# Patient Record
Sex: FEMALE | Race: BLACK OR AFRICAN AMERICAN | Employment: UNEMPLOYED | URBAN - METROPOLITAN AREA
[De-identification: names, ages, dates, MRNs, and addresses within clinical notes are randomized per-mention and may not be internally consistent; named-entity substitution may affect disease eponyms.]

---

## 2017-08-16 ENCOUNTER — OFFICE VISIT (OUTPATIENT)
Dept: FAMILY MEDICINE CLINIC | Age: 42
End: 2017-08-16

## 2017-08-16 VITALS
DIASTOLIC BLOOD PRESSURE: 75 MMHG | RESPIRATION RATE: 12 BRPM | HEART RATE: 88 BPM | WEIGHT: 153 LBS | TEMPERATURE: 98 F | BODY MASS INDEX: 25.49 KG/M2 | OXYGEN SATURATION: 99 % | HEIGHT: 65 IN | SYSTOLIC BLOOD PRESSURE: 113 MMHG

## 2017-08-16 DIAGNOSIS — R01.1 SYSTOLIC MURMUR: ICD-10-CM

## 2017-08-16 DIAGNOSIS — Z11.1 SCREENING-PULMONARY TB: Primary | ICD-10-CM

## 2017-08-16 DIAGNOSIS — D50.9 IRON DEFICIENCY ANEMIA, UNSPECIFIED IRON DEFICIENCY ANEMIA TYPE: ICD-10-CM

## 2017-08-16 NOTE — PATIENT INSTRUCTIONS
Heart Murmur: Care Instructions  Your Care Instructions    A heart murmur is a blowing, whooshing, or rasping sound made by blood moving through the heart or the blood vessels near the heart. Murmurs can be heard through a stethoscope. Heart murmurs can occur during pregnancy or during a temporary illness, such as a fever. These murmurs usually are not a problem and go away on their own. However, sometimes a heart murmur is a sign of a serious problem, such as congenital heart disease or heart valve problems, that may need treatment. You may need more tests to check your heart. The treatment depends on the specific heart problem causing the murmur. Follow-up care is a key part of your treatment and safety. Be sure to make and go to all appointments, and call your doctor if you are having problems. It's also a good idea to know your test results and keep a list of the medicines you take. How can you care for yourself at home? · Take your medicines exactly as prescribed. Call your doctor if you think you are having a problem with your medicine. You will get more details on the specific medicines your doctor prescribes. · If your doctor recommends it, limit over-the-counter medicines that have stimulants in them. These include decongestants and cold and flu medicines. · If your doctor recommends it, get more exercise. Walking is a good choice. Bit by bit, increase the amount you walk every day. Try for 30 minutes on most days of the week. You also may want to swim, bike, or do other activities. · Do not smoke. Smoking increases your risk of heart attack and stroke. If you need help quitting, talk to your doctor about stop-smoking programs and medicines. These can increase your chances of quitting for good. · Limit alcohol to 2 drinks a day for men and 1 drink a day for women. Alcohol may interfere with some of your medicines. When should you call for help?   Call 911 anytime you think you may need emergency care. For example, call if:  · You have severe trouble breathing. · You cough up pink, foamy mucus and you have trouble breathing. · You passed out (lost consciousness). · You have a seizure. · You have symptoms of a stroke. These may include:  ¨ Sudden numbness, tingling, weakness, or loss of movement in your face, arm, or leg, especially on only one side of your body. ¨ Sudden vision changes. ¨ Sudden trouble speaking. ¨ Sudden confusion or trouble understanding simple statements. ¨ Sudden problems with walking or balance. ¨ A sudden, severe headache that is different from past headaches. Call your doctor now or seek immediate medical care if:  · You have new or increased shortness of breath. · You feel dizzy or lightheaded, or you feel like you may faint. · You have sudden weight gain, such as more than 2 to 3 pounds in a day or 5 pounds in a week. (Your doctor may suggest a different range of weight gain.)  · You have increased swelling in your legs or feet. · You have a fever. Watch closely for changes in your health, and be sure to contact your doctor if you have any problems. Where can you learn more? Go to http://jacinto-marifer.info/. Yanira Romano in the search box to learn more about \"Heart Murmur: Care Instructions. \"  Current as of: April 3, 2017  Content Version: 11.3  © 6788-8175 [employer]. Care instructions adapted under license by [employer] (which disclaims liability or warranty for this information). If you have questions about a medical condition or this instruction, always ask your healthcare professional. [address] any warranty or liability for your use of this information.

## 2017-08-16 NOTE — MR AVS SNAPSHOT
Visit Information Date & Time Provider Department Dept. Phone Encounter #  
 8/16/2017  3:00 PM Shelbie Resendez MD [address] [facility] [phone] [id number] Upcoming Health Maintenance Date Due DTaP/Tdap/Td series (1 - Tdap) 11/26/1996 PAP AKA CERVICAL CYTOLOGY 11/26/1996 INFLUENZA AGE 9 TO ADULT 8/1/2017 Allergies as of 8/16/2017  Review Complete On: 8/16/2017 By: Shelbie Resendez MD  
 No Known Allergies Current Immunizations  Never Reviewed Name Date  
 TB Skin Test (PPD) Intradermal  Incomplete Not reviewed this visit You Were Diagnosed With   
  
 Codes Comments Screening-pulmonary TB    -  Primary ICD-10-CM: Z11.1 ICD-9-CM: V74.1 Iron deficiency anemia, unspecified iron deficiency anemia type     ICD-10-CM: D50.9 ICD-9-CM: 280.9 Systolic murmur     BVZ-38-ZJ: R01.1 ICD-9-CM: 662. 2 Vitals BP Pulse Temp Resp Height(growth percentile) Weight(growth percentile) 113/75 (BP 1 Location: Left arm, BP Patient Position: Sitting) 88 98 °F (36.7 °C) 12 5' 5\" (1.651 m) 153 lb (69.4 kg) SpO2 BMI OB Status Smoking Status 99% 25.46 kg/m2 Having regular periods Never Smoker Vitals History BMI and BSA Data Body Mass Index Body Surface Area  
 25.46 kg/m 2 1.78 m 2 Preferred Pharmacy Pharmacy Name Phone [facility] [address] [place] [address] Dr Guzman, [address] [place] [address] Avenue [phone] Your Updated Medication List  
  
   
This list is accurate as of: 8/16/17  3:37 PM.  Always use your most recent med list.  
  
  
  
  
 ferrous gluconate 325 mg (37 mg iron) Tab Take 1 Tab by mouth daily. ZyrTEC 10 mg tablet Generic drug:  cetirizine Take 10 mg by mouth daily. We Performed the Following AMB POC TUBERCULOSIS, INTRADERMAL (SKIN TEST) [36335 CPT(R)] CBC WITH AUTOMATED DIFF [36046 CPT(R)] FERRITIN [34567 CPT(R)] IRON PROFILE [id number] CPT(R)] METABOLIC PANEL, COMPREHENSIVE [23769 CPT(R)] To-Do List   
 08/30/2017 ECHO:  2D ECHO COMPLETE ADULT (TTE) W OR WO CONTR Patient Instructions Heart Murmur: Care Instructions Your Care Instructions A heart murmur is a blowing, whooshing, or rasping sound made by blood moving through the heart or the blood vessels near the heart. Murmurs can be heard through a stethoscope. Heart murmurs can occur during pregnancy or during a temporary illness, such as a fever. These murmurs usually are not a problem and go away on their own. However, sometimes a heart murmur is a sign of a serious problem, such as congenital heart disease or heart valve problems, that may need treatment. You may need more tests to check your heart. The treatment depends on the specific heart problem causing the murmur. Follow-up care is a key part of your treatment and safety. Be sure to make and go to all appointments, and call your doctor if you are having problems. It's also a good idea to know your test results and keep a list of the medicines you take. How can you care for yourself at home? · Take your medicines exactly as prescribed. Call your doctor if you think you are having a problem with your medicine. You will get more details on the specific medicines your doctor prescribes. · If your doctor recommends it, limit over-the-counter medicines that have stimulants in them. These include decongestants and cold and flu medicines. · If your doctor recommends it, get more exercise. Walking is a good choice. Bit by bit, increase the amount you walk every day. Try for 30 minutes on most days of the week. You also may want to swim, bike, or do other activities. · Do not smoke. Smoking increases your risk of heart attack and stroke. If you need help quitting, talk to your doctor about stop-smoking programs and medicines. These can increase your chances of quitting for good. · Limit alcohol to 2 drinks a day for men and 1 drink a day for women. Alcohol may interfere with some of your medicines. When should you call for help? Call 911 anytime you think you may need emergency care. For example, call if: 
· You have severe trouble breathing. · You cough up pink, foamy mucus and you have trouble breathing. · You passed out (lost consciousness). · You have a seizure. · You have symptoms of a stroke. These may include: 
¨ Sudden numbness, tingling, weakness, or loss of movement in your face, arm, or leg, especially on only one side of your body. ¨ Sudden vision changes. ¨ Sudden trouble speaking. ¨ Sudden confusion or trouble understanding simple statements. ¨ Sudden problems with walking or balance. ¨ A sudden, severe headache that is different from past headaches. Call your doctor now or seek immediate medical care if: 
· You have new or increased shortness of breath. · You feel dizzy or lightheaded, or you feel like you may faint. · You have sudden weight gain, such as more than 2 to 3 pounds in a day or 5 pounds in a week. (Your doctor may suggest a different range of weight gain.) · You have increased swelling in your legs or feet. · You have a fever. Watch closely for changes in your health, and be sure to contact your doctor if you have any problems. Where can you learn more? Go to http://jacinto-marifer.info/. Jina Frank in the search box to learn more about \"Heart Murmur: Care Instructions. \" Current as of: April 3, 2017 Content Version: 11.3 © 1991-5742 [employer]. Care instructions adapted under license by [employer] (which disclaims liability or warranty for this information). If you have questions about a medical condition or this instruction, always ask your healthcare professional. [place] [address] any warranty or liability for your use of this information. Introducing hospitals & HEALTH SERVICES! Getachew Gomes introduces PrestoBox patient portal. Now you can access parts of your medical record, email your doctor's office, and request medication refills online. 1. In your internet browser, go to [email or web address] 2. Click on the First Time User? Click Here link in the Sign In box. You will see the New Member Sign Up page. 3. Enter your PrestoBox Access Code exactly as it appears below. You will not need to use this code after youve completed the sign-up process. If you do not sign up before the expiration date, you must request a new code. · PrestoBox Access Code: [email or web address] Expires: 11/14/2017  3:18 PM 
 
4. Enter the last four digits of your Social Security Number (xxxx) and Date of Birth (mm/dd/yyyy) as indicated and click Submit. You will be taken to the next sign-up page. 5. Create a PrestoBox ID. This will be your PrestoBox login ID and cannot be changed, so think of one that is secure and easy to remember. 6. Create a PrestoBox password. You can change your password at any time. 7. Enter your Password Reset Question and Answer. This can be used at a later time if you forget your password. 8. Enter your e-mail address. You will receive e-mail notification when new information is available in [address] 9. Click Sign Up. You can now view and download portions of your medical record. 10. Click the Download Summary menu link to download a portable copy of your medical information. If you have questions, please visit the Frequently Asked Questions section of the PrestoBox website. Remember, PrestoBox is NOT to be used for urgent needs. For medical emergencies, dial 911. Now available from your iPhone and Android! Please provide this summary of care documentation to your next provider. Your primary care clinician is listed as [id number]. If you have any questions after today's visit, please call [phone].

## 2017-08-16 NOTE — PROGRESS NOTES
Subjective:     Tom Longoria is a 39 y.o. female who presents today with the following:  Chief Complaint   Patient presents with    [facility]    PPD Placement     New patient here today to have PPD placed as she is starting work as a  next week. No history of exposure to TB. No cough. Anemia  Patient with history of anemia, in the past has needed a blood transfusion as her Hgb was around 6. She has difficulty being compliant with iron supplementation as iron pills give her constipation, nausea, and bad stomach pain. ROS:  Gen: denies fever, chills, fatigue, weight loss, weight gain  HEENT:denies blurry vision, nasal congestion, sore throat  Resp: denies dypsnea, cough, wheezing  CV: denies chest pain, palpitations, lower extremity edema  Abd: denies nausea, vomiting, diarrhea, constipation    No Known Allergies      Current Outpatient Prescriptions:     cetirizine (ZYRTEC) 10 mg tablet, Take 10 mg by mouth daily. , Disp: , Rfl:     ferrous gluconate 325 mg (37 mg iron) Tab, Take 1 Tab by mouth daily. , Disp: 30 Tab, Rfl: 0    Past Medical History:   Diagnosis Date    Anemia     Depression     Other ill-defined conditions     chronic hives       Past Surgical History:   Procedure Laterality Date    HX GYN      cerclage x2       History   Smoking Status    Never Smoker   Smokeless Tobacco    Never Used       Social History     Social History    Marital status:      Spouse name: N/A    Number of children: N/A    Years of education: N/A     Social History Main Topics    Smoking status: Never Smoker    Smokeless tobacco: Never Used    Alcohol use No      Comment: rarely    Drug use: No    Sexual activity: Yes     Partners: Male     Other Topics Concern    None     Social History Narrative       Family History   Problem Relation Age of Onset    Cancer Mother     Diabetes Mother          Objective:     Visit Vitals    /75 (BP 1 Location: Left arm, BP Patient Position: Sitting)    Pulse 88    Temp 98 °F (36.7 °C)    Resp 12    Ht 5' 5\" (1.651 m)    Wt 153 lb (69.4 kg)    SpO2 99%    BMI 25.46 kg/m2     Gen: alert, oriented, no acute distress  Head: normocephalic, atraumatic  Eyes:sclera clear, conjunctiva clear  Oral: moist mucus membranes, no oral lesions, no pharyngeal exudate, no pharyngeal erythema  Neck: symmetric normal sized thyroid, no carotid bruits, no JVD  Resp: Normal work of breathing, lungs CTAB, no w/r/r  CV: S1, S2 normal.  2/6 systolic murmur at L and R sternal border. No rubs, or gallops. Abd:  Normal bowel sounds. Soft, not tender, not distended. Skin: no rash             Extremities: no edema    No results found for this visit on 08/16/17. Assessment/ Plan:   Diagnoses and all orders for this visit:    1. Screening-pulmonary TB  -     AMB POC TUBERCULOSIS, INTRADERMAL (SKIN TEST)  -follow up in 2 days for reading. 2. Iron deficiency anemia, unspecified iron deficiency anemia type  -     CBC WITH AUTOMATED DIFF  -     IRON PROFILE  -     FERRITIN  -     METABOLIC PANEL, COMPREHENSIVE  -if needed in the future, can consider iron transfusions as pt cannot tolerate oral iron    3. Systolic murmur  -     2D ECHO COMPLETE ADULT (TTE) W OR WO CONTR; Future  -pt has not been told she has murmur in the past.  No current cardiac symptoms. Possibly 2/2 anemia. Check labs and echo. Verbal and written instructions (see AVS) provided.  Patient expresses understanding of diagnosis and treatment plan. Stew Mcgovern.  Deandre Lanier MD

## 2017-08-16 NOTE — PROGRESS NOTES
.  Chief Complaint   Patient presents with   Ashley Armijo Establish Care    PPD Placement     . [address]

## 2017-08-17 LAB
ALBUMIN SERPL-MCNC: 4.2 G/DL (ref 3.5–5.5)
ALBUMIN/GLOB SERPL: 1.2 {RATIO} (ref 1.2–2.2)
ALP SERPL-CCNC: 66 IU/L (ref 39–117)
ALT SERPL-CCNC: 9 IU/L (ref 0–32)
AST SERPL-CCNC: 11 IU/L (ref 0–40)
BASOPHILS # BLD AUTO: 0 X10E3/UL (ref 0–0.2)
BASOPHILS NFR BLD AUTO: 0 %
BILIRUB SERPL-MCNC: 0.5 MG/DL (ref 0–1.2)
BUN SERPL-MCNC: 7 MG/DL (ref 6–24)
BUN/CREAT SERPL: 10 (ref 9–23)
CALCIUM SERPL-MCNC: 9.7 MG/DL (ref 8.7–10.2)
CHLORIDE SERPL-SCNC: 100 MMOL/L (ref 96–106)
CO2 SERPL-SCNC: 24 MMOL/L (ref 18–29)
CREAT SERPL-MCNC: 0.67 MG/DL (ref 0.57–1)
EOSINOPHIL # BLD AUTO: 0.1 X10E3/UL (ref 0–0.4)
EOSINOPHIL NFR BLD AUTO: 2 %
ERYTHROCYTE [DISTWIDTH] IN BLOOD BY AUTOMATED COUNT: 16.2 % (ref 12.3–15.4)
FERRITIN SERPL-MCNC: 11 NG/ML (ref 15–150)
GLOBULIN SER CALC-MCNC: 3.4 G/DL (ref 1.5–4.5)
GLUCOSE SERPL-MCNC: 65 MG/DL (ref 65–99)
HCT VFR BLD AUTO: 30.7 % (ref 34–46.6)
HGB BLD-MCNC: 8.9 G/DL (ref 11.1–15.9)
IMM GRANULOCYTES # BLD: 0 X10E3/UL (ref 0–0.1)
IMM GRANULOCYTES NFR BLD: 0 %
IRON SATN MFR SERPL: 17 % (ref 15–55)
IRON SERPL-MCNC: 72 UG/DL (ref 27–159)
LYMPHOCYTES # BLD AUTO: 1.3 X10E3/UL (ref 0.7–3.1)
LYMPHOCYTES NFR BLD AUTO: 35 %
MCH RBC QN AUTO: 23.9 PG (ref 26.6–33)
MCHC RBC AUTO-ENTMCNC: 29 G/DL (ref 31.5–35.7)
MCV RBC AUTO: 83 FL (ref 79–97)
MONOCYTES # BLD AUTO: 0.5 X10E3/UL (ref 0.1–0.9)
MONOCYTES NFR BLD AUTO: 12 %
NEUTROPHILS # BLD AUTO: 1.9 X10E3/UL (ref 1.4–7)
NEUTROPHILS NFR BLD AUTO: 51 %
PLATELET # BLD AUTO: 409 X10E3/UL (ref 150–379)
POTASSIUM SERPL-SCNC: 4.7 MMOL/L (ref 3.5–5.2)
PROT SERPL-MCNC: 7.6 G/DL (ref 6–8.5)
RBC # BLD AUTO: 3.72 X10E6/UL (ref 3.77–5.28)
SODIUM SERPL-SCNC: 143 MMOL/L (ref 134–144)
TIBC SERPL-MCNC: 422 UG/DL (ref 250–450)
UIBC SERPL-MCNC: 350 UG/DL (ref 131–425)
WBC # BLD AUTO: 3.7 X10E3/UL (ref 3.4–10.8)

## 2017-08-18 ENCOUNTER — OFFICE VISIT (OUTPATIENT)
Dept: FAMILY MEDICINE CLINIC | Age: 42
End: 2017-08-18

## 2017-08-18 VITALS
OXYGEN SATURATION: 97 % | SYSTOLIC BLOOD PRESSURE: 105 MMHG | WEIGHT: 153 LBS | BODY MASS INDEX: 25.49 KG/M2 | HEART RATE: 79 BPM | DIASTOLIC BLOOD PRESSURE: 72 MMHG | TEMPERATURE: 98 F | RESPIRATION RATE: 12 BRPM | HEIGHT: 65 IN

## 2017-08-18 DIAGNOSIS — D50.9 IRON DEFICIENCY ANEMIA, UNSPECIFIED IRON DEFICIENCY ANEMIA TYPE: Primary | ICD-10-CM

## 2017-08-18 DIAGNOSIS — Z11.1 SCREENING-PULMONARY TB: ICD-10-CM

## 2017-08-18 LAB
MM INDURATION POC: 0 MM (ref 0–5)
PPD POC: NORMAL NEGATIVE

## 2017-08-18 NOTE — PROGRESS NOTES
Subjective:     Kaleb Morales is a 39 y.o. female who presents today with the following:  Chief Complaint   Patient presents with    Results    PPD Reading     Anemia  Patient with anemia, Hgb 8.9 on recent labs. Iron profile normal, but ferritin low. Patient has h/o iron deficiency anemia. Menstruation regular, last 5 days/month with 1.5 days of heavy bleeding. Not currently taking iron supplement. TB screening  Pt had PPD placed on 8/16/17, here today to have it read. ROS:  Gen: denies fever, chills, fatigue  Resp: denies dypsnea, cough, wheezing  CV: denies chest pain, palpitations  Abd: denies nausea, vomiting, diarrhea, constipation      No Known Allergies      Current Outpatient Prescriptions:     cetirizine (ZYRTEC) 10 mg tablet, Take 10 mg by mouth daily. , Disp: , Rfl:     ferrous gluconate 325 mg (37 mg iron) Tab, Take 1 Tab by mouth daily. , Disp: 30 Tab, Rfl: 0    Past Medical History:   Diagnosis Date    Anemia     Depression     self treats with essential oils    Other ill-defined conditions     chronic hives       Past Surgical History:   Procedure Laterality Date    HX GYN      cerclage x2       History   Smoking Status    Never Smoker   Smokeless Tobacco    Never Used       Social History     Social History    Marital status:      Spouse name: N/A    Number of children: N/A    Years of education: N/A     Social History Main Topics    Smoking status: Never Smoker    Smokeless tobacco: Never Used    Alcohol use No      Comment: rarely    Drug use: No    Sexual activity: Yes     Partners: Male     Other Topics Concern    None     Social History Narrative       Family History   Problem Relation Age of Onset    Cancer Mother     Diabetes Mother          Objective:     Visit Vitals    /72 (BP 1 Location: Left arm, BP Patient Position: Sitting)    Pulse 79    Temp 98 °F (36.7 °C)    Resp 12    Ht 5' 5\" (1.651 m)    Wt 153 lb (69.4 kg)    SpO2 97%    BMI 25.46 kg/m2     Gen: alert, oriented, no acute distress  Head: normocephalic, atraumatic  Eyes:sclera clear, conjunctiva clear  Oral: moist mucus membranes, no oral lesions, no pharyngeal exudate, no pharyngeal erythema  Resp: Normal work of breathing, lungs CTAB, no w/r/r  CV: S1, S2 normal.  2/6 systolic murmur at R and L sternal border. Abd:  Normal bowel sounds. Soft, not tender, not distended. Skin: PPD placement with 0 mm induration. Extremities: no edema  Results for orders placed or performed in visit on 08/16/17   CBC WITH AUTOMATED DIFF   Result Value Ref Range    WBC 3.7 3.4 - 10.8 x10E3/uL    RBC 3.72 (L) 3.77 - 5.28 x10E6/uL    HGB 8.9 (L) 11.1 - 15.9 g/dL    HCT 30.7 (L) 34.0 - 46.6 %    MCV 83 79 - 97 fL    MCH 23.9 (L) 26.6 - 33.0 pg    MCHC 29.0 (L) 31.5 - 35.7 g/dL    RDW 16.2 (H) 12.3 - 15.4 %    PLATELET 326 (H) 755 - 379 x10E3/uL    NEUTROPHILS 51 %    Lymphocytes 35 %    MONOCYTES 12 %    EOSINOPHILS 2 %    BASOPHILS 0 %    ABS. NEUTROPHILS 1.9 1.4 - 7.0 x10E3/uL    Abs Lymphocytes 1.3 0.7 - 3.1 x10E3/uL    ABS. MONOCYTES 0.5 0.1 - 0.9 x10E3/uL    ABS. EOSINOPHILS 0.1 0.0 - 0.4 x10E3/uL    ABS. BASOPHILS 0.0 0.0 - 0.2 x10E3/uL    IMMATURE GRANULOCYTES 0 %    ABS. IMM.  GRANS. 0.0 0.0 - 0.1 x10E3/uL   IRON PROFILE   Result Value Ref Range    TIBC 422 250 - 450 ug/dL    UIBC 350 131 - 425 ug/dL    Iron 72 27 - 159 ug/dL    Iron % saturation 17 15 - 55 %   FERRITIN   Result Value Ref Range    Ferritin 11 (L) 15 - 696 ng/mL   METABOLIC PANEL, COMPREHENSIVE   Result Value Ref Range    Glucose 65 65 - 99 mg/dL    BUN 7 6 - 24 mg/dL    Creatinine 0.67 0.57 - 1.00 mg/dL    GFR est non- >59 mL/min/1.73    GFR est  >59 mL/min/1.73    BUN/Creatinine ratio 10 9 - 23    Sodium 143 134 - 144 mmol/L    Potassium 4.7 3.5 - 5.2 mmol/L    Chloride 100 96 - 106 mmol/L    CO2 24 18 - 29 mmol/L    Calcium 9.7 8.7 - 10.2 mg/dL    Protein, total 7.6 6.0 - 8.5 g/dL    Albumin 4.2 3.5 - 5.5 g/dL GLOBULIN, TOTAL 3.4 1.5 - 4.5 g/dL    A-G Ratio 1.2 1.2 - 2.2    Bilirubin, total 0.5 0.0 - 1.2 mg/dL    Alk. phosphatase 66 39 - 117 IU/L    AST (SGOT) 11 0 - 40 IU/L    ALT (SGPT) 9 0 - 32 IU/L   AMB POC TUBERCULOSIS, INTRADERMAL (SKIN TEST)   Result Value Ref Range    PPD neg Negative    mm Induration 0 mm         Assessment/ Plan:   Diagnoses and all orders for this visit:    1. Iron deficiency anemia, unspecified iron deficiency anemia type  -patient to start taking slow release iron and see if she can tolerate this better  -follow up 6 weeks for labs  -pt will call scheduling back to set up echo for murmur today    2. Screening-pulmonary TB  -PPD WNL  -form filled out       Verbal and written instructions (see AVS) provided.  Patient expresses understanding of diagnosis and treatment plan. Maddy Del Rosario.  Lamar George MD

## 2017-08-29 ENCOUNTER — HOSPITAL ENCOUNTER (OUTPATIENT)
Dept: NON INVASIVE DIAGNOSTICS | Age: 42
Discharge: HOME | End: 2017-08-29
Attending: FAMILY MEDICINE
Payer: MEDICAID

## 2017-08-29 DIAGNOSIS — R01.1 SYSTOLIC MURMUR: ICD-10-CM

## 2017-08-29 PROCEDURE — 93306 TTE W/DOPPLER COMPLETE: CPT

## 2017-08-30 ENCOUNTER — TELEPHONE (OUTPATIENT)
Dept: FAMILY MEDICINE CLINIC | Age: 42
End: 2017-08-30

## 2017-08-30 NOTE — PROGRESS NOTES
Please call patient-echo was normal.  I mailed out a result letter. Follow up as discussed for anemia lab recheck.

## 2017-08-30 NOTE — PROGRESS NOTES
Let Ms Marychuy Welch know Dr. Teri Hinson said Please call patient-echo was normal.  I mailed out a result letter. Follow up as discussed for anemia lab recheck.  She voiced understanding

## 2017-12-05 ENCOUNTER — APPOINTMENT (OUTPATIENT)
Dept: CT IMAGING | Age: 42
End: 2017-12-05
Attending: EMERGENCY MEDICINE
Payer: MEDICAID

## 2017-12-05 ENCOUNTER — HOSPITAL ENCOUNTER (EMERGENCY)
Age: 42
Discharge: HOME | End: 2017-12-06
Attending: EMERGENCY MEDICINE | Admitting: EMERGENCY MEDICINE
Payer: MEDICAID

## 2017-12-05 DIAGNOSIS — K04.7 DENTAL ABSCESS: Primary | ICD-10-CM

## 2017-12-05 LAB
ALBUMIN SERPL-MCNC: 3.5 G/DL (ref 3.5–5)
ALBUMIN/GLOB SERPL: 0.8 {RATIO} (ref 1.1–2.2)
ALP SERPL-CCNC: 93 U/L (ref 45–117)
ALT SERPL-CCNC: 28 U/L (ref 12–78)
ANION GAP SERPL CALC-SCNC: 7 MMOL/L (ref 5–15)
AST SERPL-CCNC: 17 U/L (ref 15–37)
BASOPHILS # BLD: 0 K/UL (ref 0–0.1)
BASOPHILS NFR BLD: 0 % (ref 0–1)
BILIRUB SERPL-MCNC: 0.4 MG/DL (ref 0.2–1)
BUN SERPL-MCNC: 9 MG/DL (ref 6–20)
BUN/CREAT SERPL: 13 (ref 12–20)
CALCIUM SERPL-MCNC: 8.6 MG/DL (ref 8.5–10.1)
CHLORIDE SERPL-SCNC: 106 MMOL/L (ref 97–108)
CO2 SERPL-SCNC: 26 MMOL/L (ref 21–32)
CREAT SERPL-MCNC: 0.69 MG/DL (ref 0.55–1.02)
EOSINOPHIL # BLD: 0.1 K/UL (ref 0–0.4)
EOSINOPHIL NFR BLD: 2 % (ref 0–7)
ERYTHROCYTE [DISTWIDTH] IN BLOOD BY AUTOMATED COUNT: 15.9 % (ref 11.5–14.5)
GLOBULIN SER CALC-MCNC: 4.2 G/DL (ref 2–4)
GLUCOSE SERPL-MCNC: 89 MG/DL (ref 65–100)
HCG UR QL: NEGATIVE
HCG UR QL: NEGATIVE
HCT VFR BLD AUTO: 25.9 % (ref 35–47)
HGB BLD-MCNC: 7.9 G/DL (ref 11.5–16)
LYMPHOCYTES # BLD: 1.4 K/UL (ref 0.8–3.5)
LYMPHOCYTES NFR BLD: 39 % (ref 12–49)
MCH RBC QN AUTO: 24.2 PG (ref 26–34)
MCHC RBC AUTO-ENTMCNC: 30.5 G/DL (ref 30–36.5)
MCV RBC AUTO: 79.2 FL (ref 80–99)
MONOCYTES # BLD: 0.5 K/UL (ref 0–1)
MONOCYTES NFR BLD: 15 % (ref 5–13)
NEUTS SEG # BLD: 1.5 K/UL (ref 1.8–8)
NEUTS SEG NFR BLD: 44 % (ref 32–75)
PLATELET # BLD AUTO: 302 K/UL (ref 150–400)
POTASSIUM SERPL-SCNC: 3.7 MMOL/L (ref 3.5–5.1)
PROT SERPL-MCNC: 7.7 G/DL (ref 6.4–8.2)
RBC # BLD AUTO: 3.27 M/UL (ref 3.8–5.2)
SODIUM SERPL-SCNC: 139 MMOL/L (ref 136–145)
WBC # BLD AUTO: 3.5 K/UL (ref 3.6–11)

## 2017-12-05 PROCEDURE — 74011250637 HC RX REV CODE- 250/637: Performed by: EMERGENCY MEDICINE

## 2017-12-05 PROCEDURE — 75810000289 HC I&D ABSCESS SIMP/COMP/MULT

## 2017-12-05 PROCEDURE — 70487 CT MAXILLOFACIAL W/DYE: CPT

## 2017-12-05 PROCEDURE — 36415 COLL VENOUS BLD VENIPUNCTURE: CPT | Performed by: EMERGENCY MEDICINE

## 2017-12-05 PROCEDURE — 74011250636 HC RX REV CODE- 250/636: Performed by: EMERGENCY MEDICINE

## 2017-12-05 PROCEDURE — 96374 THER/PROPH/DIAG INJ IV PUSH: CPT

## 2017-12-05 PROCEDURE — 80053 COMPREHEN METABOLIC PANEL: CPT | Performed by: EMERGENCY MEDICINE

## 2017-12-05 PROCEDURE — 81025 URINE PREGNANCY TEST: CPT | Performed by: EMERGENCY MEDICINE

## 2017-12-05 PROCEDURE — 96361 HYDRATE IV INFUSION ADD-ON: CPT

## 2017-12-05 PROCEDURE — 70460 CT HEAD/BRAIN W/DYE: CPT

## 2017-12-05 PROCEDURE — 74011636320 HC RX REV CODE- 636/320: Performed by: EMERGENCY MEDICINE

## 2017-12-05 PROCEDURE — 85025 COMPLETE CBC W/AUTO DIFF WBC: CPT | Performed by: EMERGENCY MEDICINE

## 2017-12-05 PROCEDURE — 99285 EMERGENCY DEPT VISIT HI MDM: CPT

## 2017-12-05 RX ORDER — SODIUM CHLORIDE 0.9 % (FLUSH) 0.9 %
10 SYRINGE (ML) INJECTION
Status: COMPLETED | OUTPATIENT
Start: 2017-12-05 | End: 2017-12-05

## 2017-12-05 RX ORDER — SODIUM CHLORIDE 9 MG/ML
50 INJECTION, SOLUTION INTRAVENOUS
Status: COMPLETED | OUTPATIENT
Start: 2017-12-05 | End: 2017-12-06

## 2017-12-05 RX ORDER — DIAZEPAM 5 MG/1
5 TABLET ORAL
Status: COMPLETED | OUTPATIENT
Start: 2017-12-05 | End: 2017-12-05

## 2017-12-05 RX ORDER — SODIUM CHLORIDE 0.9 % (FLUSH) 0.9 %
5-10 SYRINGE (ML) INJECTION AS NEEDED
Status: DISCONTINUED | OUTPATIENT
Start: 2017-12-05 | End: 2017-12-06 | Stop reason: HOSPADM

## 2017-12-05 RX ORDER — SODIUM CHLORIDE 0.9 % (FLUSH) 0.9 %
5-10 SYRINGE (ML) INJECTION EVERY 8 HOURS
Status: DISCONTINUED | OUTPATIENT
Start: 2017-12-05 | End: 2017-12-06 | Stop reason: HOSPADM

## 2017-12-05 RX ORDER — LIDOCAINE HYDROCHLORIDE 20 MG/ML
15 SOLUTION OROPHARYNGEAL
Status: DISCONTINUED | OUTPATIENT
Start: 2017-12-05 | End: 2017-12-06 | Stop reason: HOSPADM

## 2017-12-05 RX ADMIN — Medication 10 ML: at 22:34

## 2017-12-05 RX ADMIN — SODIUM CHLORIDE 500 ML: 900 INJECTION, SOLUTION INTRAVENOUS at 21:27

## 2017-12-05 RX ADMIN — SODIUM CHLORIDE 50 ML/HR: 900 INJECTION, SOLUTION INTRAVENOUS at 22:33

## 2017-12-05 RX ADMIN — DIAZEPAM 5 MG: 5 TABLET ORAL at 21:26

## 2017-12-05 RX ADMIN — IOPAMIDOL 100 ML: 755 INJECTION, SOLUTION INTRAVENOUS at 22:33

## 2017-12-05 NOTE — LETTER
[address] 
[facility] EMERGENCY DEPT 
[address] [place] [address] [address] [id number] 
[phone] Work/School Note Date: 12/5/2017 To Whom It May concern: 
 
Kiel Thomas was seen and treated today in the emergency room by the following provider(s): 
Attending Provider: Hali Sharpe may return to school on 12/07/2017.  
 
Sincerely, 
 
 
 
 
Jen Aponte, DO

## 2017-12-06 VITALS
RESPIRATION RATE: 18 BRPM | DIASTOLIC BLOOD PRESSURE: 78 MMHG | WEIGHT: 155.65 LBS | BODY MASS INDEX: 26.57 KG/M2 | HEART RATE: 97 BPM | TEMPERATURE: 98.1 F | SYSTOLIC BLOOD PRESSURE: 123 MMHG | HEIGHT: 64 IN | OXYGEN SATURATION: 98 %

## 2017-12-06 PROCEDURE — 74011250637 HC RX REV CODE- 250/637: Performed by: EMERGENCY MEDICINE

## 2017-12-06 PROCEDURE — 74011250636 HC RX REV CODE- 250/636: Performed by: EMERGENCY MEDICINE

## 2017-12-06 PROCEDURE — 96361 HYDRATE IV INFUSION ADD-ON: CPT

## 2017-12-06 PROCEDURE — 96375 TX/PRO/DX INJ NEW DRUG ADDON: CPT

## 2017-12-06 PROCEDURE — 96374 THER/PROPH/DIAG INJ IV PUSH: CPT

## 2017-12-06 RX ORDER — CLINDAMYCIN HYDROCHLORIDE 300 MG/1
300 CAPSULE ORAL 4 TIMES DAILY
Qty: 40 CAP | Refills: 0 | Status: SHIPPED | OUTPATIENT
Start: 2017-12-06 | End: 2018-03-30 | Stop reason: ALTCHOICE

## 2017-12-06 RX ORDER — DIAZEPAM 5 MG/1
5 TABLET ORAL
Qty: 15 TAB | Refills: 0 | Status: SHIPPED | OUTPATIENT
Start: 2017-12-06 | End: 2018-06-20

## 2017-12-06 RX ORDER — KETOROLAC TROMETHAMINE 30 MG/ML
30 INJECTION, SOLUTION INTRAMUSCULAR; INTRAVENOUS
Status: COMPLETED | OUTPATIENT
Start: 2017-12-06 | End: 2017-12-06

## 2017-12-06 RX ORDER — CLINDAMYCIN HYDROCHLORIDE 150 MG/1
300 CAPSULE ORAL
Status: COMPLETED | OUTPATIENT
Start: 2017-12-06 | End: 2017-12-06

## 2017-12-06 RX ORDER — CLINDAMYCIN HYDROCHLORIDE 150 MG/1
CAPSULE ORAL
Status: DISCONTINUED
Start: 2017-12-06 | End: 2017-12-06 | Stop reason: HOSPADM

## 2017-12-06 RX ORDER — NALOXONE HYDROCHLORIDE 4 MG/.1ML
SPRAY NASAL
Qty: 2 EACH | Refills: 0 | Status: SHIPPED | OUTPATIENT
Start: 2017-12-06 | End: 2019-05-09

## 2017-12-06 RX ORDER — FENTANYL CITRATE 50 UG/ML
100 INJECTION, SOLUTION INTRAMUSCULAR; INTRAVENOUS
Status: COMPLETED | OUTPATIENT
Start: 2017-12-06 | End: 2017-12-06

## 2017-12-06 RX ORDER — CHLORHEXIDINE GLUCONATE 1.2 MG/ML
15 RINSE ORAL 2 TIMES DAILY
Qty: 420 ML | Refills: 0 | Status: SHIPPED | OUTPATIENT
Start: 2017-12-06 | End: 2017-12-20

## 2017-12-06 RX ORDER — HYDROCODONE BITARTRATE AND ACETAMINOPHEN 7.5; 325 MG/1; MG/1
1 TABLET ORAL
Qty: 20 TAB | Refills: 0 | Status: SHIPPED | OUTPATIENT
Start: 2017-12-06 | End: 2019-05-09

## 2017-12-06 RX ORDER — CLINDAMYCIN HYDROCHLORIDE 150 MG/1
150 CAPSULE ORAL
Status: COMPLETED | OUTPATIENT
Start: 2017-12-06 | End: 2017-12-06

## 2017-12-06 RX ADMIN — KETOROLAC TROMETHAMINE 30 MG: 30 INJECTION, SOLUTION INTRAMUSCULAR at 01:00

## 2017-12-06 RX ADMIN — CLINDAMYCIN HYDROCHLORIDE 150 MG: 150 CAPSULE ORAL at 01:17

## 2017-12-06 RX ADMIN — FENTANYL CITRATE 100 MCG: 50 INJECTION, SOLUTION INTRAMUSCULAR; INTRAVENOUS at 01:02

## 2017-12-06 RX ADMIN — CLINDAMYCIN HYDROCHLORIDE 150 MG: 150 CAPSULE ORAL at 01:00

## 2017-12-06 NOTE — ED NOTES
Assumed care of patient. Patient is alert and oriented, does not appear to be in distress. Patient ambulatory to ED with c/o right sided facial numbness since Saturday. Pt has h/o Bell's Palsy. Was on antibiotics for tooth infection on right side.

## 2017-12-06 NOTE — ED PROVIDER NOTES
EMERGENCY DEPARTMENT HISTORY AND PHYSICAL EXAM      Date: 12/5/2017  Patient Name: Maliha Flowers    History of Presenting Illness     Chief Complaint   Patient presents with    Numbness     Pt ambulatory to triage with c/o numbness to her face since Saturday. Pt states she went to the dentist over a week ago for infected teeth and just finished her antibiotics on Saturday. Pt states the numbness started then and has progressively gotten worse. Pt states numbness starts at the top R side her head down to her jaw line. Hx of Bell's Palsy. History Provided By: Patient    HPI: Maliha Flowers, 43 y.o. female with PMHx significant for depression, and Bell's palsy presents ambulatory to the ED with cc of R sided face numbness, specifically on her forehead x 3 days. She describes the numbness as being, \"split almost half way down my face\". Pt also states she had a tooth infection on 3 teeth to which she saw a dentist for last week; she was prescribed Amoxicillin (she finished her last dosage yesterday). Pt has a Hx of Bell's palsy to which she was affected on her L side of her face, describing it as a, \"pulling\" sensation; she denies any long term deficits. Of note, she denies having numbing injections at the dentists, and her LMP was ~2 weeks ago. She further denies any headaches, nausea, vomiting, fever, trouble swallowing, and numbness in other areas of her body. Social Hx:   ETOH: no  Tobacco: no  Illicit drug use: no      PCP: Viet Melo MD    There are no other complaints, changes, or physical findings at this time.     Current Facility-Administered Medications   Medication Dose Route Frequency Provider Last Rate Last Dose    sodium chloride (NS) flush 5-10 mL  5-10 mL IntraVENous Q8H Alejandro Joseph DO        sodium chloride (NS) flush 5-10 mL  5-10 mL IntraVENous PRN Alejandro Joseph DO        lidocaine (XYLOCAINE) 2 % viscous solution 15 mL  15 mL Mouth/Throat NOW Alejandro Joseph DO         Current Outpatient Prescriptions   Medication Sig Dispense Refill    clindamycin (CLEOCIN) 300 mg capsule Take 1 Cap by mouth four (4) times daily. 40 Cap 0    chlorhexidine (PERIDEX) 0.12 % solution 15 mL by Swish and Spit route two (2) times a day for 14 days. 420 mL 0    HYDROcodone-acetaminophen (NORCO) 7.5-325 mg per tablet Take 1 Tab by mouth every six (6) hours as needed for Pain. Max Daily Amount: 4 Tabs. 20 Tab 0    diazePAM (VALIUM) 5 mg tablet Take 1 Tab by mouth every eight (8) hours as needed (spasm). Max Daily Amount: 15 mg. 15 Tab 0    naloxone (NARCAN) 4 mg/actuation nasal spray Use 1 spray intranasally into 1 nostril. Use a new Narcan nasal spray for subsequent doses and administer into alternating nostrils. May repeat every 2 to 3 minutes as needed. 2 Each 0    cetirizine (ZYRTEC) 10 mg tablet Take 10 mg by mouth daily.  ferrous gluconate 325 mg (37 mg iron) Tab Take 1 Tab by mouth daily. 30 Tab 0       Past History     Past Medical History:  Past Medical History:   Diagnosis Date    Anemia     Depression     self treats with essential oils    Other ill-defined conditions     chronic hives       Past Surgical History:  Past Surgical History:   Procedure Laterality Date    HX GYN      cerclage x2       Family History:  Family History   Problem Relation Age of Onset    Cancer Mother     Diabetes Mother        Social History:  Social History   Substance Use Topics    Smoking status: Never Smoker    Smokeless tobacco: Never Used    Alcohol use No      Comment: rarely       Allergies:  No Known Allergies      Review of Systems   Review of Systems   Constitutional: Negative. Negative for appetite change, chills, fatigue and fever. HENT: Positive for congestion. Negative for rhinorrhea, sinus pressure, sore throat and trouble swallowing. Eyes: Negative. Respiratory: Negative. Negative for cough, choking, chest tightness, shortness of breath and wheezing. Cardiovascular: Negative. Negative for chest pain, palpitations and leg swelling. Gastrointestinal: Negative for abdominal pain, constipation, diarrhea, nausea and vomiting. Endocrine: Negative. Genitourinary: Negative. Negative for difficulty urinating, dysuria, flank pain and urgency. Musculoskeletal: Negative. Skin: Negative. Neurological: Positive for numbness. Negative for dizziness, speech difficulty, weakness, light-headedness and headaches. Psychiatric/Behavioral: Negative. All other systems reviewed and are negative. Physical Exam   Physical Exam   Constitutional: She is oriented to person, place, and time. She appears well-developed and well-nourished. No distress. HENT:   Head: Normocephalic and atraumatic. Mouth/Throat: Oropharynx is clear and moist. No oropharyngeal exudate. Dental caries and multiple teeth with decay and fracture, there is area adjacent to the right bottom pre-molar, fluctuant and raised, overlying soft tissue swelling   Eyes: Conjunctivae and EOM are normal. Pupils are equal, round, and reactive to light. Neck: Normal range of motion. Neck supple. No JVD present. No tracheal deviation present. Cardiovascular: Normal rate, regular rhythm, normal heart sounds and intact distal pulses. No murmur heard. Pulmonary/Chest: Effort normal and breath sounds normal. No stridor. No respiratory distress. She has no wheezes. She has no rales. She exhibits no tenderness. Abdominal: Soft. She exhibits no distension. There is no tenderness. There is no rebound and no guarding. Musculoskeletal: Normal range of motion. She exhibits no edema or tenderness. Lymphadenopathy:     She has cervical adenopathy (submandibular). Neurological: She is alert and oriented to person, place, and time. No cranial nerve deficit. No gross motor or sensory deficits    Skin: Skin is warm and dry. She is not diaphoretic. Psychiatric: She has a normal mood and affect.  Her behavior is normal. Nursing note and vitals reviewed. Diagnostic Study Results     Labs -     Recent Results (from the past 12 hour(s))   CBC WITH AUTOMATED DIFF    Collection Time: 12/05/17  9:16 PM   Result Value Ref Range    WBC 3.5 (L) 3.6 - 11.0 K/uL    RBC 3.27 (L) 3.80 - 5.20 M/uL    HGB 7.9 (L) 11.5 - 16.0 g/dL    HCT 25.9 (L) 35.0 - 47.0 %    MCV 79.2 (L) 80.0 - 99.0 FL    MCH 24.2 (L) 26.0 - 34.0 PG    MCHC 30.5 30.0 - 36.5 g/dL    RDW 15.9 (H) 11.5 - 14.5 %    PLATELET 115 737 - 969 K/uL    NEUTROPHILS 44 32 - 75 %    LYMPHOCYTES 39 12 - 49 %    MONOCYTES 15 (H) 5 - 13 %    EOSINOPHILS 2 0 - 7 %    BASOPHILS 0 0 - 1 %    ABS. NEUTROPHILS 1.5 (L) 1.8 - 8.0 K/UL    ABS. LYMPHOCYTES 1.4 0.8 - 3.5 K/UL    ABS. MONOCYTES 0.5 0.0 - 1.0 K/UL    ABS. EOSINOPHILS 0.1 0.0 - 0.4 K/UL    ABS. BASOPHILS 0.0 0.0 - 0.1 K/UL   METABOLIC PANEL, COMPREHENSIVE    Collection Time: 12/05/17  9:16 PM   Result Value Ref Range    Sodium 139 136 - 145 mmol/L    Potassium 3.7 3.5 - 5.1 mmol/L    Chloride 106 97 - 108 mmol/L    CO2 26 21 - 32 mmol/L    Anion gap 7 5 - 15 mmol/L    Glucose 89 65 - 100 mg/dL    BUN 9 6 - 20 MG/DL    Creatinine 0.69 0.55 - 1.02 MG/DL    BUN/Creatinine ratio 13 12 - 20      GFR est AA >60 >60 ml/min/1.73m2    GFR est non-AA >60 >60 ml/min/1.73m2    Calcium 8.6 8.5 - 10.1 MG/DL    Bilirubin, total 0.4 0.2 - 1.0 MG/DL    ALT (SGPT) 28 12 - 78 U/L    AST (SGOT) 17 15 - 37 U/L    Alk.  phosphatase 93 45 - 117 U/L    Protein, total 7.7 6.4 - 8.2 g/dL    Albumin 3.5 3.5 - 5.0 g/dL    Globulin 4.2 (H) 2.0 - 4.0 g/dL    A-G Ratio 0.8 (L) 1.1 - 2.2     HCG URINE, QL    Collection Time: 12/05/17  9:30 PM   Result Value Ref Range    HCG urine, Ql. NEGATIVE  NEG     HCG URINE, QL. - POC    Collection Time: 12/05/17  9:49 PM   Result Value Ref Range    Pregnancy test,urine (POC) NEGATIVE  NEG         Radiologic Studies -   CT Results  (Last 48 hours)               12/05/17 2233  CT HEAD W CONT Final result    Impression: IMPRESSION: Normal.                   Narrative:  EXAM:  CT HEAD W CONT       INDICATION:   facial numbness with recent dental infection. Complains of   numbness to her face since Saturday. Pt states she went to the dentist over a   week ago for infected teeth and just finished her antibiotics on Saturday. Pt   states the numbness started then and has progressively gotten worse. ?Pt states   numbness starts at the top R side her head down to her jaw line. COMPARISON: CT head 2/26/2013. CONTRAST:   100. mL of Isovue-300. TECHNIQUE:  CT of the head was performed following uneventful rapid bolus   intravenous administration of contrast.  Brain and bone windows were generated. CT dose reduction was achieved through use of a standardized protocol tailored   for this examination and automatic exposure control for dose modulation. FINDINGS:   The ventricles and sulci are normal in size, shape and configuration and   symmetrical and midline. There is no significant white matter disease. There is   no intracranial hemorrhage, extra-axial collection, mass, mass effect or midline   shift. The basilar cisterns are open. No acute infarct is identified. There is normal enhancement. No enhancing masses or other abnormal areas of   enhancement are identified. The bone windows demonstrate no abnormalities. The visualized portions of the   paranasal sinuses and mastoid air cells are clear. 12/05/17 2233  CT MAXILLOFACIAL W CONT Final result    Impression:  IMPRESSION: Fatty and gas density collection adjacent to the body right mandible   associated with lucency at the base of the second right molar may reflect   surgical packing material. There is a small periapical lucency at the base of   the root of the areas right third mandibular molar suspicious for periapical   abscess.        Narrative:  EXAM:  CT MAXILLOFACIAL W CONT       INDICATION:   facial numbness and swelling, recent dental infection. Complains   of numbness to her face since Saturday. Pt states she went to the dentist over a   week ago for infected teeth and just finished her antibiotics on Saturday. ?Pt   states the numbness started then and has progressively gotten worse. ?Pt states   numbness starts at the top R side her head down to her jaw line. COMPARISON:  None. CONTRAST:   100 mL of Isovue-300       TECHNIQUE:  Multislice helical CT of the facial bones was performed in the axial   plane during uneventful rapid bolus intravenous contrast administration. Coronal   and sagittal reformations were generated. CT dose reduction was achieved   through use of a standardized protocol tailored for this examination and   automatic exposure control for dose modulation. FINDINGS:       There is approximately 1.3 x 1.7 x 2.4 cm gas and fat density collection lateral   to the body of the mandible on the right adjacent to lucency associated with the   second right mandibular molar. There is lucency at the base of the root of the   right third mandibular molar. Apart from carious dentition the remainder the   mandible and the maxilla grossly unremarkable. There is no facial fracture or other osseous abnormality. The visualized paranasal sinuses and mastoid air cells are clear. The globes, optic nerves and extraocular muscles are normal.       No abnormalities are identified within the visualized portions of the brain or   nasopharynx. Medical Decision Making   I am the first provider for this patient. I reviewed the vital signs, available nursing notes, past medical history, past surgical history, family history and social history. Vital Signs-Reviewed the patient's vital signs.   Patient Vitals for the past 12 hrs:   Temp Pulse Resp BP SpO2   12/05/17 2115 - - - 119/79 100 %   12/05/17 1950 98.1 °F (36.7 °C) 97 18 142/78 -       Provider Notes (Medical Decision Making):     DDx: Bells palsy, dental abscess, dental infection. ED Course:   Initial assessment performed. The patients presenting problems have been discussed, and they are in agreement with the care plan formulated and outlined with them. I have encouraged them to ask questions as they arise throughout their visit. Procedure Note - Incision and Drainage:   12:35 AM  Performed by: Leidy Green DO  Complexity: Simple  Skin was not prepped. Anesthesia achieved with Lidocaine 2% without epinephrine using a topical application. Abscess to 1st molar was incised with # 11 blade, and .5mLs of purulent drainage was expressed. Wound probed and irrigated. Area was not packed. Sterile dressing applied. Estimated blood loss: no blood loss  The procedure took 1-15 minutes, and pt tolerated well. Disposition:  DISCHARGE NOTE  1:00 AM  The patient has been re-evaluated and is ready for discharge. Reviewed available results with patient. Counseled pt on diagnosis and care plan. Pt has expressed understanding, and all questions have been answered. Pt agrees with plan and agrees to F/U as recommended, or return to the ED if their sxs worsen. Discharge instructions have been provided and explained to the pt, along with reasons to return to the ED. PLAN:  1. Current Discharge Medication List      START taking these medications    Details   clindamycin (CLEOCIN) 300 mg capsule Take 1 Cap by mouth four (4) times daily. Qty: 40 Cap, Refills: 0      chlorhexidine (PERIDEX) 0.12 % solution 15 mL by Swish and Spit route two (2) times a day for 14 days. Qty: 420 mL, Refills: 0      HYDROcodone-acetaminophen (NORCO) 7.5-325 mg per tablet Take 1 Tab by mouth every six (6) hours as needed for Pain. Max Daily Amount: 4 Tabs. Qty: 20 Tab, Refills: 0      diazePAM (VALIUM) 5 mg tablet Take 1 Tab by mouth every eight (8) hours as needed (spasm).  Max Daily Amount: 15 mg.  Qty: 15 Tab, Refills: 0      naloxone (NARCAN) 4 mg/actuation nasal spray Use 1 spray intranasally into 1 nostril. Use a new Narcan nasal spray for subsequent doses and administer into alternating nostrils. May repeat every 2 to 3 minutes as needed. Qty: 2 Each, Refills: 0           2. Follow-up Information     Follow up With Details Comments [address], [place]  You will need to follow-up with an oral surgeon [address] [place] [address]  [phone]          Return to ED if worse     Diagnosis     Clinical Impression:   1. Dental abscess        Attestations: This note is prepared by Juni Walker, acting as Scribe for Chaim Singh DO. The scribe's documentation has been prepared under my direction and personally reviewed by me in its entirety. I confirm that the note above accurately reflects all work, treatment, procedures, and medical decision making performed by me.   Chaim Singh DO

## 2017-12-06 NOTE — ED NOTES
Discharge instructions reviewed with pt and copy given along with RX by ER MD Perri Pickens. Patient ambulatory from ED accompanied by spouse in no sign of distress or discomfort.

## 2017-12-06 NOTE — DISCHARGE INSTRUCTIONS
Abscessed Tooth: Care Instructions  Your Care Instructions    An abscessed tooth is a tooth that has a pocket of pus in the tissues around it. Pus forms when the body tries to fight an infection caused by bacteria. If the pus cannot drain, it forms an abscess. An abscessed tooth can cause red, swollen gums and throbbing pain, especially when you chew. You may have a bad taste in your mouth and a fever, and your jaw may swell. Damage to the tooth, untreated tooth decay, or gum disease can cause an abscessed tooth. An abscessed tooth needs to be treated by a dental professional right away. If it is not treated, the infection could spread to other parts of your body. Your dentist will give you antibiotics to stop the infection. He or she may make a hole in the tooth or cut open (hernandez) the abscess inside your mouth so that the infection can drain, which should relieve your pain. You may need to have a root canal treatment, which tries to save your tooth by taking out the infected pulp and replacing it with a healing medicine and/or a filling. If these treatments do not work, your tooth may have to be removed. Follow-up care is a key part of your treatment and safety. Be sure to make and go to all appointments, and call your doctor if you are having problems. It's also a good idea to know your test results and keep a list of the medicines you take. How can you care for yourself at home? · Reduce pain and swelling in your face and jaw by putting ice or a cold pack on the outside of your cheek for 10 to 20 minutes at a time. Put a thin cloth between the ice and your skin. · Take pain medicines exactly as directed. ¨ If the doctor gave you a prescription medicine for pain, take it as prescribed. ¨ If you are not taking a prescription pain medicine, ask your doctor if you can take an over-the-counter medicine. · Take your antibiotics as directed. Do not stop taking them just because you feel better.  You need to take the full course of antibiotics. To prevent tooth abscess  · Brush and floss every day, and have regular dental checkups. · Eat a healthy diet, and avoid sugary foods and drinks. · Do not smoke, use e-cigarettes with nicotine, or use spit tobacco. Tobacco and nicotine slow your ability to heal. Tobacco also increases your risk for gum disease and cancer of the mouth and throat. If you need help quitting, talk to your doctor about stop-smoking programs and medicines. These can increase your chances of quitting for good. When should you call for help? Call 911 anytime you think you may need emergency care. For example, call if:  ? · You have trouble breathing. ?Call your doctor now or seek immediate medical care if:  ? · You have new or worse symptoms of infection, such as:  ¨ Increased pain, swelling, warmth, or redness. ¨ Red streaks leading from the area. ¨ Pus draining from the area. ¨ A fever. ? Watch closely for changes in your health, and be sure to contact your doctor if:  ? · You do not get better as expected. Where can you learn more? Go to http://jacinto-marifer.info/. Enter P265 in the search box to learn more about \"Abscessed Tooth: Care Instructions. \"  Current as of: May 12, 2017  Content Version: 11.4  © 2874-0654 [employer]. Care instructions adapted under license by [employer] (which disclaims liability or warranty for this information). If you have questions about a medical condition or this instruction, always ask your healthcare professional. [place] [address] any warranty or liability for your use of this information.

## 2018-03-16 ENCOUNTER — OFFICE VISIT (OUTPATIENT)
Dept: FAMILY MEDICINE CLINIC | Age: 43
End: 2018-03-16

## 2018-03-16 VITALS
BODY MASS INDEX: 25.44 KG/M2 | RESPIRATION RATE: 16 BRPM | HEART RATE: 81 BPM | TEMPERATURE: 98.5 F | SYSTOLIC BLOOD PRESSURE: 108 MMHG | HEIGHT: 64 IN | OXYGEN SATURATION: 99 % | DIASTOLIC BLOOD PRESSURE: 70 MMHG | WEIGHT: 149 LBS

## 2018-03-16 DIAGNOSIS — D50.9 IRON DEFICIENCY ANEMIA, UNSPECIFIED IRON DEFICIENCY ANEMIA TYPE: Primary | ICD-10-CM

## 2018-03-16 DIAGNOSIS — F41.9 ANXIETY AND DEPRESSION: ICD-10-CM

## 2018-03-16 DIAGNOSIS — F32.A ANXIETY AND DEPRESSION: ICD-10-CM

## 2018-03-16 RX ORDER — LANOLIN ALCOHOL/MO/W.PET/CERES
1000 CREAM (GRAM) TOPICAL DAILY
COMMUNITY
End: 2019-05-09

## 2018-03-16 RX ORDER — SERTRALINE HYDROCHLORIDE 25 MG/1
25 TABLET, FILM COATED ORAL DAILY
Qty: 30 TAB | Refills: 0 | Status: SHIPPED | OUTPATIENT
Start: 2018-03-16 | End: 2018-04-06 | Stop reason: SDUPTHER

## 2018-03-16 NOTE — PROGRESS NOTES
Subjective:     Grace Currie is a 43 y.o. female who presents today with the following:  Chief Complaint   Patient presents with    Heart Murmur     follow up       Anxiety  Patient with pmh anxiety. States she has managed this in the past with relaxation techniques, but she has had high level of stress in the last months and everything is feeling overwhelming to her. 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 Denies SI/HI. Endorses periodic depressive spells, but denies feeling depressed. She is also concerned because she feels like she has physical symptoms with anxiety including lip tingling, finger tip tingling, and heart palpitations. Had recent echo cardiogram for murmur and it was normal.      Iron deficiency anemia  Patient with PMH iron deficiency anemia. Started on oral supplementation. Pt has not had check of labs since December, at that time Hgb was 7.9 (previously had been 8.9)    ROS:  Gen: denies fever, chills, fatigue, weight loss, weight gain  HEENT:denies blurry vision, nasal congestion, sore throat  Resp: denies dypsnea, cough, wheezing  CV: denies chest pain, palpitations, lower extremity edema  Abd: denies nausea, vomiting, diarrhea, constipation  Neuro: denies numbness/tingling  Endo: denies polyuria, polydipsia, heat/cold intolerance  Heme: no lymphadenopathy    No Known Allergies      Current Outpatient Prescriptions:     cyanocobalamin 1,000 mcg tablet, Take 1,000 mcg by mouth daily. , Disp: , Rfl:     sertraline (ZOLOFT) 25 mg tablet, Take 1 Tab by mouth daily. , Disp: 30 Tab, Rfl: 0    diazePAM (VALIUM) 5 mg tablet, Take 1 Tab by mouth every eight (8) hours as needed (spasm). Max Daily Amount: 15 mg., Disp: 15 Tab, Rfl: 0    cetirizine (ZYRTEC) 10 mg tablet, Take 10 mg by mouth daily. , Disp: , Rfl:     ferrous gluconate 325 mg (37 mg iron) Tab, Take 1 Tab by mouth daily. , Disp: 30 Tab, Rfl: 0   clindamycin (CLEOCIN) 300 mg capsule, Take 1 Cap by mouth four (4) times daily. , Disp: 40 Cap, Rfl: 0    HYDROcodone-acetaminophen (NORCO) 7.5-325 mg per tablet, Take 1 Tab by mouth every six (6) hours as needed for Pain. Max Daily Amount: 4 Tabs., Disp: 20 Tab, Rfl: 0    naloxone (NARCAN) 4 mg/actuation nasal spray, Use 1 spray intranasally into 1 nostril. Use a new Narcan nasal spray for subsequent doses and administer into alternating nostrils. May repeat every 2 to 3 minutes as needed. , Disp: 2 Each, Rfl: 0    Past Medical History:   Diagnosis Date    Anemia     Depression     self treats with essential oils    Other ill-defined conditions(259.89)     chronic hives       Past Surgical History:   Procedure Laterality Date    HX GYN      cerclage x2    HX HEENT  02/2018    4 teeth removed        History   Smoking Status    Never Smoker   Smokeless Tobacco    Never Used       Social History     Social History    Marital status:      Spouse name: N/A    Number of children: N/A    Years of education: N/A     Social History Main Topics    Smoking status: Never Smoker    Smokeless tobacco: Never Used    Alcohol use No      Comment: rarely    Drug use: No    Sexual activity: Yes     Partners: Male     Other Topics Concern    None     Social History Narrative       Family History   Problem Relation Age of Onset    Cancer Mother     Diabetes Mother          Objective:     Visit Vitals    /70 (BP 1 Location: Right arm, BP Patient Position: Sitting)    Pulse 81    Temp 98.5 °F (36.9 °C) (Oral)    Resp 16    Ht 5' 4\" (1.626 m)    Wt 149 lb (67.6 kg)    LMP 02/23/2018    SpO2 99%    BMI 25.58 kg/m2     Gen: alert, oriented, no acute distress  Head: normocephalic, atraumatic  Eyes:sclera clear, conjunctiva clear  Oral: moist mucus membranes, no oral lesions, no pharyngeal exudate, no pharyngeal erythema  Neck:  no carotid bruits, no JVD  Resp: Normal work of breathing, lungs CTAB, no w/r/r  CV: S1, S2 normal.  No murmurs, rubs, or gallops. Psych: Normal affect and demeanor. Normal insight and judgement. No flight of ideas. Denies SI/HI. No results found for this visit on 03/16/18. Assessment/ Plan:   Diagnoses and all orders for this visit:    1. Iron deficiency anemia, unspecified iron deficiency anemia type  -     METABOLIC PANEL, COMPREHENSIVE  -     CBC WITH AUTOMATED DIFF  -     IRON PROFILE  -     FERRITIN    For anemia, patient may need iron infusion as her hgb may not be responding to oral supplementation. 2. Anxiety and depression  -     sertraline (ZOLOFT) 25 mg tablet; Take 1 Tab by mouth daily. Start zoloft, call in 1 week with status up date and follow up in 2 weeks for medication recheck. Face-to-face time greater than 25 minutes.  Greater than 50% of today's visit devoted to counseling and coordination of care. Verbal and written instructions (see AVS) provided.  Patient expresses understanding of diagnosis and treatment plan. Sebastien Isabel.  Maeve Slade MD

## 2018-03-16 NOTE — PATIENT INSTRUCTIONS
Sertraline (By mouth)   Sertraline (SER-tra-maureen)  Treats depression, obsessive-compulsive disorder (OCD), posttraumatic stress disorder (PTSD), premenstrual dysphoric disorder (PMDD), social anxiety disorder, and panic disorder. This medicine is an SSRI. Brand Name(s): Zoloft   There may be other brand names for this medicine. When This Medicine Should Not Be Used: This medicine is not right for everyone. Do not use it if you had an allergic reaction to sertraline. How to Use This Medicine:   Liquid, Tablet  · Take your medicine as directed. Your dose may need to be changed several times to find what works best for you. You may need to take it for a few weeks or months before you feel better. · Oral liquid: Use the dropper provided to remove the medicine and mix it with 1/2 cup (4 ounces) of water, ginger ale, lemon-lime soda, lemonade, or orange juice. Drink the mixture right away. It is normal for it to look a bit hazy. · This medicine should come with a Medication Guide. Ask your pharmacist for a copy if you do not have one. · Missed dose: Take a dose as soon as you remember. If it is almost time for your next dose, wait until then and take a regular dose. Do not take extra medicine to make up for a missed dose. · Store the medicine in a closed container at room temperature, away from heat, moisture, and direct light. Drugs and Foods to Avoid:   Ask your doctor or pharmacist before using any other medicine, including over-the-counter medicines, vitamins, and herbal products. · Do not use this medicine together with pimozide. Do not use this medicine and an MAO inhibitor (MAOI) within 14 days of each other. Do not use the oral liquid form of sertraline if you are also using disulfiram.  · Some medicines can affect how sertraline works.  Tell your doctor if you are using the following:   ¨ Buspirone, cimetidine, cisapride, diazepam, digitoxin, fentanyl, flecainide, lithium, phenytoin, propafenone, [place] Mark's wort, tramadol, tryptophan supplements, or valproate  ¨ A blood thinner (such as warfarin), a diuretic (water pill), an NSAID pain or arthritis medicine (such as aspirin, diclofenac, ibuprofen), a tricyclic antidepressant, a triptan medicine for migraine headaches  · Do not drink alcohol while you are using this medicine. Warnings While Using This Medicine:   · Tell your doctor if you are pregnant or breastfeeding, or if you have liver disease, bleeding problems, glaucoma, heart disease, or a seizure disorder. · For some children, teenagers, and young adults, this medicine may increase mental or emotional problems. This may lead to thoughts of suicide and violence. Talk with your doctor right away if you have any thoughts or behavior changes that concern you. Tell your doctor if you or anyone in your family has a history of bipolar disorder or suicide attempts. · This medicine may cause the following problems:   ¨ Serotonin syndrome (when taken with certain medicines)  ¨ Low sodium levels (more common in elderly patients and those who take diuretics or become dehydrated)  · Tell your doctor if you are sensitive to latex, because the oral liquid comes with a latex rubber dropper. · This medicine may make you dizzy or drowsy. Do not drive or do anything that could be dangerous until you know how this medicine affects you. · Do not stop using this medicine suddenly. Your doctor will need to slowly decrease your dose before you stop it completely. · Your doctor will check your progress and the effects of this medicine at regular visits. Keep all appointments. · Keep all medicine out of the reach of children. Never share your medicine with anyone.   Possible Side Effects While Using This Medicine:   Call your doctor right away if you notice any of these side effects:  · Allergic reaction: Itching or hives, swelling in your face or hands, swelling or tingling in your mouth or throat, chest tightness, trouble breathing  · Anxiety, restlessness, fast heartbeat, fever, sweating, muscle spasms, twitching, nausea, vomiting, diarrhea, seeing or hearing things that are not there  · Blistering, peeling, or red skin rash  · Confusion, weakness, and muscle twitching  · Eye pain, vision changes, seeing halos around lights  · Feeling more excited or energetic than usual  · Thoughts of hurting yourself or others, unusual behavior  · Unusual bleeding or bruising  If you notice these less serious side effects, talk with your doctor:   · Dry mouth  · Loss of appetite, weight loss  · Mild diarrhea, constipation, nausea, vomiting  · Sexual problems  · Sleepiness, or trouble sleeping  If you notice other side effects that you think are caused by this medicine, tell your doctor. Call your doctor for medical advice about side effects. You may report side effects to FDA at 7-352-FDA-4933  © 2017 [address] [employer] Information is for End User's use only and may not be sold, redistributed or otherwise used for commercial purposes. The above information is an  only. It is not intended as medical advice for individual conditions or treatments. Talk to your doctor, nurse or pharmacist before following any medical regimen to see if it is safe and effective for you.

## 2018-03-16 NOTE — PROGRESS NOTES
Chief Complaint   Patient presents with    Heart Murmur     follow up      1. Have you been to the ER, urgent care clinic since your last visit? Hospitalized since your last visit? Yes When: [place] [facility] [place] visit at [facility] for abcess     2. Have you seen or consulted any other health care providers outside of the [address] [place] [address] since your last visit? Include any pap smears or colon screening. No     Pt is also concerned about at times that her anxiety is causing her to be short of breath and causing her mouth to \"become numb. \"

## 2018-03-16 NOTE — MR AVS SNAPSHOT
[facility] 
 
 
 [address] [place] [address] 
[phone] Patient: Neymar Burns MRN: [id number] :1975 Visit Information Date & Time Provider Department Dept. Phone Encounter #  
 3/16/2018 10:00 AM Angelina Tolentino MD [address] [phone] [id number] Upcoming Health Maintenance Date Due DTaP/Tdap/Td series (1 - Tdap) 1996 PAP AKA CERVICAL CYTOLOGY 1996 Influenza Age 5 to Adult 2017 Allergies as of 3/16/2018  Review Complete On: 3/16/2018 By: Angelina Tolentino MD  
 No Known Allergies Current Immunizations  Reviewed on 2017 Name Date  
 TB Skin Test (PPD) Intradermal 2017 Not reviewed this visit You Were Diagnosed With   
  
 Codes Comments Iron deficiency anemia, unspecified iron deficiency anemia type    -  Primary ICD-10-CM: D50.9 ICD-9-CM: 280.9 Anxiety and depression     ICD-10-CM: F41.8 ICD-9-CM: 300.00, 311 Vitals BP Pulse Temp Resp Height(growth percentile) Weight(growth percentile) 108/70 (BP 1 Location: Right arm, BP Patient Position: Sitting) 81 98.5 °F (36.9 °C) (Oral) 16 5' 4\" (1.626 m) 149 lb (67.6 kg) LMP SpO2 BMI OB Status Smoking Status 2018 99% 25.58 kg/m2 Having regular periods Never Smoker Vitals History BMI and BSA Data Body Mass Index Body Surface Area 25.58 kg/m 2 1.75 m 2 Preferred Pharmacy Pharmacy Name Phone [phone] [address] , [address] [place] [address] [phone] Your Updated Medication List  
  
   
This list is accurate as of 3/16/18 10:43 AM.  Always use your most recent med list.  
  
  
  
  
 clindamycin 300 mg capsule Commonly known as:  CLEOCIN Take 1 Cap by mouth four (4) times daily. cyanocobalamin 1,000 mcg tablet Take 1,000 mcg by mouth daily. diazePAM 5 mg tablet Commonly known as:  VALIUM Take 1 Tab by mouth every eight (8) hours as needed (spasm). Max Daily Amount: 15 mg.  
  
 ferrous gluconate 325 mg (37 mg iron) Tab Take 1 Tab by mouth daily. HYDROcodone-acetaminophen 7.5-325 mg per tablet Commonly known as:  Merly  Take 1 Tab by mouth every six (6) hours as needed for Pain. Max Daily Amount: 4 Tabs. naloxone 4 mg/actuation nasal spray Commonly known as:  [employer] Use 1 spray intranasally into 1 nostril. Use a new Narcan nasal spray for subsequent doses and administer into alternating nostrils. May repeat every 2 to 3 minutes as needed. sertraline 25 mg tablet Commonly known as:  ZOLOFT Take 1 Tab by mouth daily. ZyrTEC 10 mg tablet Generic drug:  cetirizine Take 10 mg by mouth daily. Prescriptions Sent to Pharmacy Refills  
 sertraline (ZOLOFT) 25 mg tablet 0 Sig: Take 1 Tab by mouth daily. Class: Normal  
 Pharmacy: [facility] [address] [place] [address] [place] [address] Ph #: [phone] Route: Oral  
  
We Performed the Following CBC WITH AUTOMATED DIFF [19542 CPT(R)] FERRITIN [01825 CPT(R)] IRON PROFILE [id number] CPT(R)] METABOLIC PANEL, COMPREHENSIVE [13809 CPT(R)] Patient Instructions Sertraline (By mouth) Sertraline (SER-tra-maureen) Treats depression, obsessive-compulsive disorder (OCD), posttraumatic stress disorder (PTSD), premenstrual dysphoric disorder (PMDD), social anxiety disorder, and panic disorder. This medicine is an SSRI. Brand Name(s): Zoloft There may be other brand names for this medicine. When This Medicine Should Not Be Used: This medicine is not right for everyone. Do not use it if you had an allergic reaction to sertraline. How to Use This Medicine:  
Liquid, Tablet · Take your medicine as directed.  Your dose may need to be changed several times to find what works best for you. You may need to take it for a few weeks or months before you feel better. · Oral liquid: Use the dropper provided to remove the medicine and mix it with 1/2 cup (4 ounces) of water, ginger ale, lemon-lime soda, lemonade, or orange juice. Drink the mixture right away. It is normal for it to look a bit hazy. · This medicine should come with a Medication Guide. Ask your pharmacist for a copy if you do not have one. · Missed dose: Take a dose as soon as you remember. If it is almost time for your next dose, wait until then and take a regular dose. Do not take extra medicine to make up for a missed dose. · Store the medicine in a closed container at room temperature, away from heat, moisture, and direct light. Drugs and Foods to Avoid: Ask your doctor or pharmacist before using any other medicine, including over-the-counter medicines, vitamins, and herbal products. · Do not use this medicine together with pimozide. Do not use this medicine and an MAO inhibitor (MAOI) within 14 days of each other. Do not use the oral liquid form of sertraline if you are also using disulfiram. 
· Some medicines can affect how sertraline works. Tell your doctor if you are using the following:  
¨ Buspirone, cimetidine, cisapride, diazepam, digitoxin, fentanyl, flecainide, lithium, phenytoin, propafenone, Zohra's wort, tramadol, tryptophan supplements, or valproate ¨ A blood thinner (such as warfarin), a diuretic (water pill), an NSAID pain or arthritis medicine (such as aspirin, diclofenac, ibuprofen), a tricyclic antidepressant, a triptan medicine for migraine headaches · Do not drink alcohol while you are using this medicine. Warnings While Using This Medicine: · Tell your doctor if you are pregnant or breastfeeding, or if you have liver disease, bleeding problems, glaucoma, heart disease, or a seizure disorder.  
· For some children, teenagers, and young adults, this medicine may increase mental or emotional problems. This may lead to thoughts of suicide and violence. Talk with your doctor right away if you have any thoughts or behavior changes that concern you. Tell your doctor if you or anyone in your family has a history of bipolar disorder or suicide attempts. · This medicine may cause the following problems:  
¨ Serotonin syndrome (when taken with certain medicines) ¨ Low sodium levels (more common in elderly patients and those who take diuretics or become dehydrated) · Tell your doctor if you are sensitive to latex, because the oral liquid comes with a latex rubber dropper. · This medicine may make you dizzy or drowsy. Do not drive or do anything that could be dangerous until you know how this medicine affects you. · Do not stop using this medicine suddenly. Your doctor will need to slowly decrease your dose before you stop it completely. · Your doctor will check your progress and the effects of this medicine at regular visits. Keep all appointments. · Keep all medicine out of the reach of children. Never share your medicine with anyone. Possible Side Effects While Using This Medicine:  
Call your doctor right away if you notice any of these side effects: · Allergic reaction: Itching or hives, swelling in your face or hands, swelling or tingling in your mouth or throat, chest tightness, trouble breathing · Anxiety, restlessness, fast heartbeat, fever, sweating, muscle spasms, twitching, nausea, vomiting, diarrhea, seeing or hearing things that are not there · Blistering, peeling, or red skin rash · Confusion, weakness, and muscle twitching · Eye pain, vision changes, seeing halos around lights · Feeling more excited or energetic than usual 
· Thoughts of hurting yourself or others, unusual behavior · Unusual bleeding or bruising If you notice these less serious side effects, talk with your doctor: · Dry mouth · Loss of appetite, weight loss · Mild diarrhea, constipation, nausea, vomiting · Sexual problems · Sleepiness, or trouble sleeping If you notice other side effects that you think are caused by this medicine, tell your doctor. Call your doctor for medical advice about side effects. You may report side effects to FDA at 9-809-SLI-9257 © 2017 [address] Information is for End User's use only and may not be sold, redistributed or otherwise used for commercial purposes. The above information is an  only. It is not intended as medical advice for individual conditions or treatments. Talk to your doctor, nurse or pharmacist before following any medical regimen to see if it is safe and effective for you. Introducing [facility]! [facility] introduces Oakmonkey patient portal. Now you can access parts of your medical record, email your doctor's office, and request medication refills online. 1. In your internet browser, go to [email or web address] 2. Click on the First Time User? Click Here link in the Sign In box. You will see the New Member Sign Up page. 3. Enter your Oakmonkey Access Code exactly as it appears below. You will not need to use this code after youve completed the sign-up process. If you do not sign up before the expiration date, you must request a new code. · Oakmonkey Access Code: [email or web address] Expires: 6/14/2018 10:41 AM 
 
4. Enter the last four digits of your Social Security Number (xxxx) and Date of Birth (mm/dd/yyyy) as indicated and click Submit. You will be taken to the next sign-up page. 5. Create a Promptu Systemst ID. This will be your Oakmonkey login ID and cannot be changed, so think of one that is secure and easy to remember. 6. Create a Oakmonkey password. You can change your password at any time. 7. Enter your Password Reset Question and Answer. This can be used at a later time if you forget your password. 8. Enter your e-mail address. You will receive e-mail notification when new information is available in [address] 9. Click Sign Up. You can now view and download portions of your medical record. 10. Click the Download Summary menu link to download a portable copy of your medical information. If you have questions, please visit the Frequently Asked Questions section of the iOmando website. Remember, iOmando is NOT to be used for urgent needs. For medical emergencies, dial 911. Now available from your iPhone and Android! Please provide this summary of care documentation to your next provider. Your primary care clinician is listed as Colonel Marsh. If you have any questions after today's visit, please call [phone].

## 2018-03-17 LAB
ALBUMIN SERPL-MCNC: 4.1 G/DL (ref 3.5–5.5)
ALBUMIN/GLOB SERPL: 1.4 {RATIO} (ref 1.2–2.2)
ALP SERPL-CCNC: 66 IU/L (ref 39–117)
ALT SERPL-CCNC: 8 IU/L (ref 0–32)
AST SERPL-CCNC: 15 IU/L (ref 0–40)
BASOPHILS # BLD AUTO: 0 X10E3/UL (ref 0–0.2)
BASOPHILS NFR BLD AUTO: 0 %
BILIRUB SERPL-MCNC: 0.4 MG/DL (ref 0–1.2)
BUN SERPL-MCNC: 9 MG/DL (ref 6–24)
BUN/CREAT SERPL: 11 (ref 9–23)
CALCIUM SERPL-MCNC: 9.4 MG/DL (ref 8.7–10.2)
CHLORIDE SERPL-SCNC: 102 MMOL/L (ref 96–106)
CO2 SERPL-SCNC: 24 MMOL/L (ref 18–29)
CREAT SERPL-MCNC: 0.83 MG/DL (ref 0.57–1)
EOSINOPHIL # BLD AUTO: 0.1 X10E3/UL (ref 0–0.4)
EOSINOPHIL NFR BLD AUTO: 2 %
ERYTHROCYTE [DISTWIDTH] IN BLOOD BY AUTOMATED COUNT: 15.1 % (ref 12.3–15.4)
FERRITIN SERPL-MCNC: 24 NG/ML (ref 15–150)
GFR SERPLBLD CREATININE-BSD FMLA CKD-EPI: 101 ML/MIN/1.73
GFR SERPLBLD CREATININE-BSD FMLA CKD-EPI: 87 ML/MIN/1.73
GLOBULIN SER CALC-MCNC: 2.9 G/DL (ref 1.5–4.5)
GLUCOSE SERPL-MCNC: 102 MG/DL (ref 65–99)
HCT VFR BLD AUTO: 34.4 % (ref 34–46.6)
HGB BLD-MCNC: 10.9 G/DL (ref 11.1–15.9)
IMM GRANULOCYTES # BLD: 0 X10E3/UL (ref 0–0.1)
IMM GRANULOCYTES NFR BLD: 0 %
IRON SATN MFR SERPL: 15 % (ref 15–55)
IRON SERPL-MCNC: 47 UG/DL (ref 27–159)
LYMPHOCYTES # BLD AUTO: 1.2 X10E3/UL (ref 0.7–3.1)
LYMPHOCYTES NFR BLD AUTO: 39 %
MCH RBC QN AUTO: 28.5 PG (ref 26.6–33)
MCHC RBC AUTO-ENTMCNC: 31.7 G/DL (ref 31.5–35.7)
MCV RBC AUTO: 90 FL (ref 79–97)
MONOCYTES # BLD AUTO: 0.4 X10E3/UL (ref 0.1–0.9)
MONOCYTES NFR BLD AUTO: 12 %
NEUTROPHILS # BLD AUTO: 1.4 X10E3/UL (ref 1.4–7)
NEUTROPHILS NFR BLD AUTO: 47 %
PLATELET # BLD AUTO: 282 X10E3/UL (ref 150–379)
POTASSIUM SERPL-SCNC: 4 MMOL/L (ref 3.5–5.2)
PROT SERPL-MCNC: 7 G/DL (ref 6–8.5)
RBC # BLD AUTO: 3.83 X10E6/UL (ref 3.77–5.28)
SODIUM SERPL-SCNC: 140 MMOL/L (ref 134–144)
TIBC SERPL-MCNC: 322 UG/DL (ref 250–450)
UIBC SERPL-MCNC: 275 UG/DL (ref 131–425)
WBC # BLD AUTO: 3 X10E3/UL (ref 3.4–10.8)

## 2018-03-20 NOTE — PROGRESS NOTES
Please call patient-her labs look good. Anemia is almost resolved. Continue taking iron supplements. Follow up as discussed in 2 weeks for med evaluation. Thanks.

## 2018-03-22 NOTE — PROGRESS NOTES
Ms. Debra Pretty notified Dr. Maeve Slade said         Please call patient-her labs look good. Celia Juarez is almost resolved.  Continue taking iron supplements.  Follow up as discussed in 2 weeks for med evaluation.     She voiced understanding and apt set up for 3/30/18

## 2018-03-30 ENCOUNTER — OFFICE VISIT (OUTPATIENT)
Dept: FAMILY MEDICINE CLINIC | Age: 43
End: 2018-03-30

## 2018-03-30 VITALS
HEIGHT: 64 IN | HEART RATE: 80 BPM | OXYGEN SATURATION: 98 % | TEMPERATURE: 98 F | SYSTOLIC BLOOD PRESSURE: 99 MMHG | BODY MASS INDEX: 24.59 KG/M2 | RESPIRATION RATE: 12 BRPM | DIASTOLIC BLOOD PRESSURE: 64 MMHG | WEIGHT: 144 LBS

## 2018-03-30 DIAGNOSIS — F41.9 ANXIETY AND DEPRESSION: Primary | ICD-10-CM

## 2018-03-30 DIAGNOSIS — D50.9 IRON DEFICIENCY ANEMIA, UNSPECIFIED IRON DEFICIENCY ANEMIA TYPE: ICD-10-CM

## 2018-03-30 DIAGNOSIS — F32.A ANXIETY AND DEPRESSION: Primary | ICD-10-CM

## 2018-03-30 RX ORDER — LANOLIN ALCOHOL/MO/W.PET/CERES
CREAM (GRAM) TOPICAL
COMMUNITY
End: 2022-02-01 | Stop reason: ALTCHOICE

## 2018-03-30 NOTE — PROGRESS NOTES
Subjective:     Sherry Locke is a 43 y.o. female who presents today with the following:  Chief Complaint   Patient presents with    Medication Evaluation     Patient started on Zoloft for anxiety and depression a few weeks ago. Feels as though it is helping her mood and she feels more calm overall. Side effects include: difficulty sleeping and nausea. She has switched taking the medication from PM to AM and is now using Melatonin to sleep at night. Also notes that she feels very nauseated and this is keeping her from eating consistently. ROS:  Gen: denies fever, chills, fatigue, weight loss, weight gain  HEENT:denies blurry vision,   Resp: denies dypsnea,  CV: denies chest pain, palpitations, lower extremity edema  Abd: denies nausea, vomiting, diarrhea, constipation  Neuro: denies numbness/tingling      No Known Allergies      Current Outpatient Prescriptions:     ferrous sulfate (SLOW FE) 142 mg (45 mg iron) ER tablet, Take  by mouth Daily (before breakfast). , Disp: , Rfl:     melatonin 3 mg tablet, Take  by mouth., Disp: , Rfl:     cyanocobalamin 1,000 mcg tablet, Take 1,000 mcg by mouth daily. , Disp: , Rfl:     sertraline (ZOLOFT) 25 mg tablet, Take 1 Tab by mouth daily. , Disp: 30 Tab, Rfl: 0    cetirizine (ZYRTEC) 10 mg tablet, Take 10 mg by mouth daily. , Disp: , Rfl:     HYDROcodone-acetaminophen (NORCO) 7.5-325 mg per tablet, Take 1 Tab by mouth every six (6) hours as needed for Pain. Max Daily Amount: 4 Tabs., Disp: 20 Tab, Rfl: 0    diazePAM (VALIUM) 5 mg tablet, Take 1 Tab by mouth every eight (8) hours as needed (spasm). Max Daily Amount: 15 mg., Disp: 15 Tab, Rfl: 0    naloxone (NARCAN) 4 mg/actuation nasal spray, Use 1 spray intranasally into 1 nostril. Use a new Narcan nasal spray for subsequent doses and administer into alternating nostrils. May repeat every 2 to 3 minutes as needed. , Disp: 2 Each, Rfl: 0    ferrous gluconate 325 mg (37 mg iron) Tab, Take 1 Tab by mouth daily., Disp: 30 Tab, Rfl: 0    Past Medical History:   Diagnosis Date    Anemia     Depression     self treats with essential oils    Other ill-defined conditions(799.89)     chronic hives       Past Surgical History:   Procedure Laterality Date    HX GYN      cerclage x2    HX HEENT  02/2018    4 teeth removed        History   Smoking Status    Never Smoker   Smokeless Tobacco    Never Used       Social History     Social History    Marital status:      Spouse name: N/A    Number of children: N/A    Years of education: N/A     Social History Main Topics    Smoking status: Never Smoker    Smokeless tobacco: Never Used    Alcohol use No      Comment: rarely    Drug use: No    Sexual activity: Yes     Partners: Male     Other Topics Concern    None     Social History Narrative       Family History   Problem Relation Age of Onset    Cancer Mother     Diabetes Mother          Objective:     Visit Vitals    BP 99/64 (BP 1 Location: Left arm, BP Patient Position: Sitting)    Pulse 80    Temp 98 °F (36.7 °C) (Oral)    Resp 12    Ht 5' 4\" (1.626 m)    Wt 144 lb (65.3 kg)    SpO2 98%    BMI 24.72 kg/m2     Wt Readings from Last 3 Encounters:   03/30/18 144 lb (65.3 kg)   03/16/18 149 lb (67.6 kg)   12/05/17 155 lb 10.3 oz (70.6 kg)       Gen: alert, oriented, no acute distress  Head: normocephalic, atraumatic  Eyes:sclera clear, conjunctiva clear  Oral: moist mucus membranes, no oral lesions, no pharyngeal exudate, no pharyngeal erythema  Neck: symmetric normal sized thyroid, no carotid bruits, no JVD  Resp: Normal work of breathing, lungs CTAB, no w/r/r  CV: S1, S2 normal.  No murmurs, rubs, or gallops. Psych: Normal affect and demeanor. Normal insight and judgement. No flight of ideas. Denies SI/HI.      Results for orders placed or performed in visit on 15/41/48   METABOLIC PANEL, COMPREHENSIVE   Result Value Ref Range    Glucose 102 (H) 65 - 99 mg/dL    BUN 9 6 - 24 mg/dL Creatinine 0.83 0.57 - 1.00 mg/dL    GFR est non-AA 87 >59 mL/min/1.73    GFR est  >59 mL/min/1.73    BUN/Creatinine ratio 11 9 - 23    Sodium 140 134 - 144 mmol/L    Potassium 4.0 3.5 - 5.2 mmol/L    Chloride 102 96 - 106 mmol/L    CO2 24 18 - 29 mmol/L    Calcium 9.4 8.7 - 10.2 mg/dL    Protein, total 7.0 6.0 - 8.5 g/dL    Albumin 4.1 3.5 - 5.5 g/dL    GLOBULIN, TOTAL 2.9 1.5 - 4.5 g/dL    A-G Ratio 1.4 1.2 - 2.2    Bilirubin, total 0.4 0.0 - 1.2 mg/dL    Alk. phosphatase 66 39 - 117 IU/L    AST (SGOT) 15 0 - 40 IU/L    ALT (SGPT) 8 0 - 32 IU/L   CBC WITH AUTOMATED DIFF   Result Value Ref Range    WBC 3.0 (L) 3.4 - 10.8 x10E3/uL    RBC 3.83 3.77 - 5.28 x10E6/uL    HGB 10.9 (L) 11.1 - 15.9 g/dL    HCT 34.4 34.0 - 46.6 %    MCV 90 79 - 97 fL    MCH 28.5 26.6 - 33.0 pg    MCHC 31.7 31.5 - 35.7 g/dL    RDW 15.1 12.3 - 15.4 %    PLATELET 520 870 - 582 x10E3/uL    NEUTROPHILS 47 Not Estab. %    Lymphocytes 39 Not Estab. %    MONOCYTES 12 Not Estab. %    EOSINOPHILS 2 Not Estab. %    BASOPHILS 0 Not Estab. %    ABS. NEUTROPHILS 1.4 1.4 - 7.0 x10E3/uL    Abs Lymphocytes 1.2 0.7 - 3.1 x10E3/uL    ABS. MONOCYTES 0.4 0.1 - 0.9 x10E3/uL    ABS. EOSINOPHILS 0.1 0.0 - 0.4 x10E3/uL    ABS. BASOPHILS 0.0 0.0 - 0.2 x10E3/uL    IMMATURE GRANULOCYTES 0 Not Estab. %    ABS. IMM. GRANS. 0.0 0.0 - 0.1 x10E3/uL   IRON PROFILE   Result Value Ref Range    TIBC 322 250 - 450 ug/dL    UIBC 275 131 - 425 ug/dL    Iron 47 27 - 159 ug/dL    Iron % saturation 15 15 - 55 %   FERRITIN   Result Value Ref Range    Ferritin 24 15 - 150 ng/mL         Assessment/ Plan:   Diagnoses and all orders for this visit:    1. Anxiety and depression  -stable, slightly improving on Zoloft. Major side effects however; has lost 5 pounds in the last 2 weeks. Give medication another week at current dose and see if side effects improve. If not will consider switching to Paxil.       2. Iron deficiency anemia, unspecified iron deficiency anemia type   -improved on oral iron. Pt will continue with this. Recheck in 3 months    Patient to call in 1 week to discuss side  Effects/improvement. Sooner if she decides she wants to switch meds. Verbal and written instructions (see AVS) provided.  Patient expresses understanding of diagnosis and treatment plan. Gab Sierra.  Linus Courser, MD

## 2018-03-30 NOTE — PROGRESS NOTES
Chief Complaint   Patient presents with    Medication Evaluation     Patient is here to discuss side effects of zoloft. Patient has no appetite and she is having trouble sleeping. 1. Have you been to the ER, urgent care clinic since your last visit? Hospitalized since your last visit? No    2. Have you seen or consulted any other health care providers outside of the [address] [place] [address] since your last visit? Include any pap smears or colon screening.  No

## 2018-04-06 ENCOUNTER — TELEPHONE (OUTPATIENT)
Dept: FAMILY MEDICINE CLINIC | Age: 43
End: 2018-04-06

## 2018-04-06 DIAGNOSIS — F41.9 ANXIETY AND DEPRESSION: ICD-10-CM

## 2018-04-06 DIAGNOSIS — F32.A ANXIETY AND DEPRESSION: ICD-10-CM

## 2018-04-06 RX ORDER — SERTRALINE HYDROCHLORIDE 50 MG/1
50 TABLET, FILM COATED ORAL DAILY
Qty: 30 TAB | Refills: 2 | Status: SHIPPED | OUTPATIENT
Start: 2018-04-06 | End: 2018-05-01 | Stop reason: SDUPTHER

## 2018-04-06 NOTE — TELEPHONE ENCOUNTER
In lieu in visit, she was told to call Dr Alejandra Barlow for a f/u via phone call. I made her aware that we are short staffed and that she may not get a phone call until later today.  She can be reached at [phone]

## 2018-04-06 NOTE — TELEPHONE ENCOUNTER
Patient states side effects are improved. Will increase zoloft dose to 50 mg daily and then follow up in 3 months for med eval/hgb recheck.

## 2018-04-10 DIAGNOSIS — F41.9 ANXIETY AND DEPRESSION: ICD-10-CM

## 2018-04-10 DIAGNOSIS — F32.A ANXIETY AND DEPRESSION: ICD-10-CM

## 2018-04-10 RX ORDER — SERTRALINE HYDROCHLORIDE 50 MG/1
50 TABLET, FILM COATED ORAL DAILY
Qty: 30 TAB | Refills: 2 | OUTPATIENT
Start: 2018-04-10

## 2018-05-01 DIAGNOSIS — F32.A ANXIETY AND DEPRESSION: ICD-10-CM

## 2018-05-01 DIAGNOSIS — F41.9 ANXIETY AND DEPRESSION: ICD-10-CM

## 2018-05-01 RX ORDER — SERTRALINE HYDROCHLORIDE 50 MG/1
50 TABLET, FILM COATED ORAL DAILY
Qty: 30 TAB | Refills: 2 | Status: SHIPPED | OUTPATIENT
Start: 2018-05-01 | End: 2018-09-26 | Stop reason: SDUPTHER

## 2018-05-01 NOTE — TELEPHONE ENCOUNTER
Chief Complaint   Patient presents with    Medication Refill     Last refill:   3 weeks ago (4/6/2018)        sertraline (ZOLOFT) 50 mg tablet       Take 1 Tab by mouth daily.       Dispense: 30 Tab     Refills: 2     Start: 4/6/2018     By: Cas Tavarez MD

## 2018-06-14 ENCOUNTER — TELEPHONE (OUTPATIENT)
Dept: FAMILY MEDICINE CLINIC | Age: 43
End: 2018-06-14

## 2018-06-14 NOTE — TELEPHONE ENCOUNTER
Patient states she has noticed that she is about having 3 BM's a day and some mucus. Patient states she is craving wheat cereal and eating it every day. Patient is having more anxiety symptoms. Patient states she keeps googling her symptoms. Please advise.

## 2018-06-19 ENCOUNTER — OFFICE VISIT (OUTPATIENT)
Dept: FAMILY MEDICINE CLINIC | Age: 43
End: 2018-06-19

## 2018-06-19 VITALS
HEIGHT: 64 IN | TEMPERATURE: 99 F | RESPIRATION RATE: 12 BRPM | WEIGHT: 144 LBS | BODY MASS INDEX: 24.59 KG/M2 | SYSTOLIC BLOOD PRESSURE: 94 MMHG | OXYGEN SATURATION: 98 % | HEART RATE: 100 BPM | DIASTOLIC BLOOD PRESSURE: 64 MMHG

## 2018-06-19 DIAGNOSIS — F41.9 ANXIETY AND DEPRESSION: ICD-10-CM

## 2018-06-19 DIAGNOSIS — F32.A ANXIETY AND DEPRESSION: ICD-10-CM

## 2018-06-19 DIAGNOSIS — R19.5 OTHER FECAL ABNORMALITIES: ICD-10-CM

## 2018-06-19 DIAGNOSIS — R19.4 RECENT CHANGE IN FREQUENCY OF BOWEL MOVEMENTS: Primary | ICD-10-CM

## 2018-06-19 NOTE — Clinical Note
Please give patient referral and call in ativan when she comes this AM.  Placed labs as future orders. Thanks.

## 2018-06-19 NOTE — PROGRESS NOTES
Chief Complaint   Patient presents with    Stool Color Change     mucus in stool     Patient states her stools have changed in amount, frequency and mucus. Per Dr. Oliver Almanza verbal order Ativan 1 mg,  1 tab PO Q 8 hrs PRN for anxiety. # 5 with # 0 refills. Called into [employer].

## 2018-06-20 ENCOUNTER — LAB ONLY (OUTPATIENT)
Dept: FAMILY MEDICINE CLINIC | Age: 43
End: 2018-06-20

## 2018-06-20 RX ORDER — LORAZEPAM 1 MG/1
1 TABLET ORAL
Qty: 5 TAB | Refills: 0 | Status: SHIPPED | OUTPATIENT
Start: 2018-06-20 | End: 2019-05-15

## 2018-06-20 NOTE — PROGRESS NOTES
Subjective:     Elana Adler is a 43 y.o. female who presents today with the following:  Chief Complaint   Patient presents with    Stool Color Change     mucus in stool     Patient here today with several weeks of frequent BM. She had several courses of antibiotics in January for a tooth infection and subsequent surgery and noted that her increased BM started soon after this. She notes that she is have a normal BM after every meal now. This is different for her because previously she was extremely constipated, which she always attributed to lack of water intake along with supplemental iron pills. Recently (in the last 1-2 months) patient has noticed mucus along with stools. States the biggest change she has made is an increase in wheat cereal she has been eating-feels like she goes through 1.5 bags/week of this cereal.  Has noted some weight loss in the last year, but she attributes this to starting a new job instead of being at home all the time. Denies fever, chills, abdominal pain. Occasional bright red blood (scant amount) when wiping, has not seen this recently. ROS:  Gen: denies fever, chills, fatigue, weight loss, weight gain  HEENT:denies blurry vision, nasal congestion, sore throat  Resp: denies dypsnea, cough, wheezing  CV: denies chest pain, palpitations, lower extremity edema  Abd: denies nausea, vomiting, diarrhea, constipation  Neuro: denies numbness/tingling  Endo: denies polyuria, polydipsia, heat/cold intolerance  Heme: no lymphadenopathy    No Known Allergies      Current Outpatient Prescriptions:     LORazepam (ATIVAN) 1 mg tablet, Take 1 Tab by mouth every eight (8) hours as needed for Anxiety. Max Daily Amount: 3 mg., Disp: 5 Tab, Rfl: 0    sertraline (ZOLOFT) 50 mg tablet, Take 1 Tab by mouth daily. , Disp: 30 Tab, Rfl: 2    ferrous sulfate (SLOW FE) 142 mg (45 mg iron) ER tablet, Take  by mouth Daily (before breakfast). , Disp: , Rfl:     melatonin 3 mg tablet, Take  by mouth., Disp: , Rfl:     cyanocobalamin 1,000 mcg tablet, Take 1,000 mcg by mouth daily. , Disp: , Rfl:     cetirizine (ZYRTEC) 10 mg tablet, Take 10 mg by mouth daily. , Disp: , Rfl:     ferrous gluconate 325 mg (37 mg iron) Tab, Take 1 Tab by mouth daily. , Disp: 30 Tab, Rfl: 0    HYDROcodone-acetaminophen (NORCO) 7.5-325 mg per tablet, Take 1 Tab by mouth every six (6) hours as needed for Pain. Max Daily Amount: 4 Tabs., Disp: 20 Tab, Rfl: 0    naloxone (NARCAN) 4 mg/actuation nasal spray, Use 1 spray intranasally into 1 nostril. Use a new Narcan nasal spray for subsequent doses and administer into alternating nostrils. May repeat every 2 to 3 minutes as needed. , Disp: 2 Each, Rfl: 0    Past Medical History:   Diagnosis Date    Anemia     Depression     self treats with essential oils    Other ill-defined conditions(789.89)     chronic hives       Past Surgical History:   Procedure Laterality Date    HX GYN      cerclage x2    HX HEENT  02/2018    4 teeth removed        History   Smoking Status    Never Smoker   Smokeless Tobacco    Never Used       Social History     Social History    Marital status:      Spouse name: N/A    Number of children: N/A    Years of education: N/A     Social History Main Topics    Smoking status: Never Smoker    Smokeless tobacco: Never Used    Alcohol use No      Comment: rarely    Drug use: No    Sexual activity: Yes     Partners: Male     Other Topics Concern    None     Social History Narrative       Family History   Problem Relation Age of Onset    Cancer Mother     Diabetes Mother          Objective:     Visit Vitals    BP 94/64 (BP 1 Location: Left arm, BP Patient Position: Sitting)    Pulse 100    Temp 99 °F (37.2 °C) (Oral)    Resp 12    Ht 5' 4\" (1.626 m)    Wt 144 lb (65.3 kg)    LMP 05/29/2018    SpO2 98%    BMI 24.72 kg/m2     Gen: alert, oriented, no acute distress  Head: normocephalic, atraumatic  Eyes:sclera clear, conjunctiva clear  Oral: moist mucus membranes, no oral lesions, no pharyngeal exudate, no pharyngeal erythema  Resp: Normal work of breathing, lungs CTAB, no w/r/r  CV: S1, S2 normal.  No murmurs, rubs, or gallops. Abd:  Normal bowel sounds. Soft, not tender, not distended. Psych: Normal affect and demeanor. Normal insight and judgement. No flight of ideas. Denies SI/HI. No results found for this visit on 06/19/18. Assessment/ Plan:   Diagnoses and all orders for this visit:    1. Recent change in frequency of bowel movements  -     CELIAC ANTIBODY PROFILE; Future  -     CBC WITH AUTOMATED DIFF; Future  -     METABOLIC PANEL, COMPREHENSIVE; Future  -     REFERRAL TO GASTROENTEROLOGY    2. Other fecal abnormalities  -     CELIAC ANTIBODY PROFILE; Future  -     CBC WITH AUTOMATED DIFF; Future  -     METABOLIC PANEL, COMPREHENSIVE; Future  -     REFERRAL TO GASTROENTEROLOGY    3. Anxiety and depression  -     LORazepam (ATIVAN) 1 mg tablet; Take 1 Tab by mouth every eight (8) hours as needed for Anxiety. Max Daily Amount: 3 mg. Advised patient to trial herself OFF of the wheat cereal and see if her BM frequency changes. OK to have regular BM as she is, but will check celiac panel to rule out gluten sensitivity. Discussed with patient that blood test is not an actual diagnosis of celiac (that biopsy is gold standard), but roge lstart here. Also check blood count. Gave referral to GI in case symptoms worsen and pt decides she wants to see specialist.  I do not think she needs a colonoscopy at this time. Follow up 1 month, sooner if needed. Ativan given to help calm anxiety for daughters upcoming surgical procedure at [facility]. Verbal and written instructions (see AVS) provided.  Patient expresses understanding of diagnosis and treatment plan. Dolly Dejesus.  Christine Guerrero MD

## 2018-06-21 LAB
ALBUMIN SERPL-MCNC: 4 G/DL (ref 3.5–5.5)
ALBUMIN/GLOB SERPL: 1.4 {RATIO} (ref 1.2–2.2)
ALP SERPL-CCNC: 77 IU/L (ref 39–117)
ALT SERPL-CCNC: 9 IU/L (ref 0–32)
AST SERPL-CCNC: 15 IU/L (ref 0–40)
BASOPHILS # BLD AUTO: 0 X10E3/UL (ref 0–0.2)
BASOPHILS NFR BLD AUTO: 0 %
BILIRUB SERPL-MCNC: 0.5 MG/DL (ref 0–1.2)
BUN SERPL-MCNC: 10 MG/DL (ref 6–24)
BUN/CREAT SERPL: 13 (ref 9–23)
CALCIUM SERPL-MCNC: 9.3 MG/DL (ref 8.7–10.2)
CHLORIDE SERPL-SCNC: 103 MMOL/L (ref 96–106)
CO2 SERPL-SCNC: 25 MMOL/L (ref 20–29)
CREAT SERPL-MCNC: 0.77 MG/DL (ref 0.57–1)
EOSINOPHIL # BLD AUTO: 0.2 X10E3/UL (ref 0–0.4)
EOSINOPHIL NFR BLD AUTO: 6 %
ERYTHROCYTE [DISTWIDTH] IN BLOOD BY AUTOMATED COUNT: 13.4 % (ref 12.3–15.4)
GFR SERPLBLD CREATININE-BSD FMLA CKD-EPI: 110 ML/MIN/1.73
GFR SERPLBLD CREATININE-BSD FMLA CKD-EPI: 96 ML/MIN/1.73
GLIADIN PEPTIDE IGA SER-ACNC: 4 UNITS (ref 0–19)
GLIADIN PEPTIDE IGG SER-ACNC: 2 UNITS (ref 0–19)
GLOBULIN SER CALC-MCNC: 2.8 G/DL (ref 1.5–4.5)
GLUCOSE SERPL-MCNC: 69 MG/DL (ref 65–99)
HCT VFR BLD AUTO: 32.3 % (ref 34–46.6)
HGB BLD-MCNC: 10.2 G/DL (ref 11.1–15.9)
IGA SERPL-MCNC: 323 MG/DL (ref 87–352)
IMM GRANULOCYTES # BLD: 0 X10E3/UL (ref 0–0.1)
IMM GRANULOCYTES NFR BLD: 0 %
LYMPHOCYTES # BLD AUTO: 1.1 X10E3/UL (ref 0.7–3.1)
LYMPHOCYTES NFR BLD AUTO: 33 %
MCH RBC QN AUTO: 29.2 PG (ref 26.6–33)
MCHC RBC AUTO-ENTMCNC: 31.6 G/DL (ref 31.5–35.7)
MCV RBC AUTO: 93 FL (ref 79–97)
MONOCYTES # BLD AUTO: 0.3 X10E3/UL (ref 0.1–0.9)
MONOCYTES NFR BLD AUTO: 10 %
NEUTROPHILS # BLD AUTO: 1.7 X10E3/UL (ref 1.4–7)
NEUTROPHILS NFR BLD AUTO: 51 %
PLATELET # BLD AUTO: 329 X10E3/UL (ref 150–379)
POTASSIUM SERPL-SCNC: 4.4 MMOL/L (ref 3.5–5.2)
PROT SERPL-MCNC: 6.8 G/DL (ref 6–8.5)
RBC # BLD AUTO: 3.49 X10E6/UL (ref 3.77–5.28)
SODIUM SERPL-SCNC: 141 MMOL/L (ref 134–144)
TTG IGA SER-ACNC: <2 U/ML (ref 0–3)
TTG IGG SER-ACNC: <2 U/ML (ref 0–5)
WBC # BLD AUTO: 3.4 X10E3/UL (ref 3.4–10.8)

## 2018-07-12 NOTE — PROGRESS NOTES
Please call patient-celiac disease testing was negative (this checks for gluten allergy). She is still anemic, so continue taking iron. If stomach issues persist she will need to see GI.

## 2018-09-26 DIAGNOSIS — F41.9 ANXIETY AND DEPRESSION: ICD-10-CM

## 2018-09-26 DIAGNOSIS — F32.A ANXIETY AND DEPRESSION: ICD-10-CM

## 2018-09-26 RX ORDER — SERTRALINE HYDROCHLORIDE 50 MG/1
50 TABLET, FILM COATED ORAL DAILY
Qty: 90 TAB | Refills: 2 | Status: SHIPPED | OUTPATIENT
Start: 2018-09-26 | End: 2019-05-09 | Stop reason: ALTCHOICE

## 2019-05-09 ENCOUNTER — OFFICE VISIT (OUTPATIENT)
Dept: FAMILY MEDICINE CLINIC | Age: 44
End: 2019-05-09

## 2019-05-09 VITALS
BODY MASS INDEX: 26.29 KG/M2 | OXYGEN SATURATION: 100 % | RESPIRATION RATE: 17 BRPM | TEMPERATURE: 98.5 F | DIASTOLIC BLOOD PRESSURE: 71 MMHG | HEIGHT: 64 IN | WEIGHT: 154 LBS | SYSTOLIC BLOOD PRESSURE: 107 MMHG | HEART RATE: 90 BPM

## 2019-05-09 DIAGNOSIS — F34.1 DYSTHYMIA: ICD-10-CM

## 2019-05-09 DIAGNOSIS — G56.01 RIGHT CARPAL TUNNEL SYNDROME: Primary | ICD-10-CM

## 2019-05-09 RX ORDER — PAROXETINE 10 MG/1
10 TABLET, FILM COATED ORAL DAILY
Qty: 30 TAB | Refills: 1 | Status: SHIPPED | OUTPATIENT
Start: 2019-05-09 | End: 2019-06-30 | Stop reason: SDUPTHER

## 2019-05-09 NOTE — PROGRESS NOTES
Chief Complaint Patient presents with  Anxiety  Numbness  
  right hand numbness Health Maintenance reviewed 1. Have you been to the ER, urgent care clinic since your last visit? Hospitalized since your last visit? No 
 
2. Have you seen or consulted any other health care providers outside of the [facility] since your last visit? Include any pap smears or colon screening.  No

## 2019-05-15 ENCOUNTER — OFFICE VISIT (OUTPATIENT)
Dept: FAMILY MEDICINE CLINIC | Age: 44
End: 2019-05-15

## 2019-05-15 ENCOUNTER — TELEPHONE (OUTPATIENT)
Dept: FAMILY MEDICINE CLINIC | Age: 44
End: 2019-05-15

## 2019-05-15 ENCOUNTER — HOSPITAL ENCOUNTER (EMERGENCY)
Age: 44
Discharge: HOME | End: 2019-05-15
Attending: EMERGENCY MEDICINE
Payer: MEDICAID

## 2019-05-15 VITALS
HEIGHT: 64 IN | RESPIRATION RATE: 16 BRPM | OXYGEN SATURATION: 100 % | WEIGHT: 154.4 LBS | HEART RATE: 73 BPM | DIASTOLIC BLOOD PRESSURE: 66 MMHG | SYSTOLIC BLOOD PRESSURE: 107 MMHG | BODY MASS INDEX: 26.36 KG/M2 | TEMPERATURE: 98.5 F

## 2019-05-15 VITALS
OXYGEN SATURATION: 100 % | RESPIRATION RATE: 14 BRPM | SYSTOLIC BLOOD PRESSURE: 129 MMHG | HEART RATE: 73 BPM | TEMPERATURE: 99.3 F | DIASTOLIC BLOOD PRESSURE: 75 MMHG

## 2019-05-15 DIAGNOSIS — R06.09 DOE (DYSPNEA ON EXERTION): Primary | ICD-10-CM

## 2019-05-15 DIAGNOSIS — D64.9 ANEMIA, UNSPECIFIED TYPE: ICD-10-CM

## 2019-05-15 DIAGNOSIS — D50.9 MICROCYTIC ANEMIA: Primary | ICD-10-CM

## 2019-05-15 DIAGNOSIS — F34.1 DYSTHYMIA: ICD-10-CM

## 2019-05-15 LAB
ALBUMIN SERPL-MCNC: 3.5 G/DL (ref 3.5–5)
ALBUMIN/GLOB SERPL: 0.9 {RATIO} (ref 1.1–2.2)
ALP SERPL-CCNC: 124 U/L (ref 45–117)
ALT SERPL-CCNC: 63 U/L (ref 12–78)
ANION GAP SERPL CALC-SCNC: 10 MMOL/L (ref 5–15)
AST SERPL-CCNC: 41 U/L (ref 15–37)
ATRIAL RATE: 91 BPM
BILIRUB SERPL-MCNC: 0.8 MG/DL (ref 0.2–1)
BUN SERPL-MCNC: 10 MG/DL (ref 6–20)
BUN/CREAT SERPL: 14 (ref 12–20)
CALCIUM SERPL-MCNC: 9 MG/DL (ref 8.5–10.1)
CALCULATED P AXIS, ECG09: 64 DEGREES
CALCULATED R AXIS, ECG10: 48 DEGREES
CALCULATED T AXIS, ECG11: 51 DEGREES
CHLORIDE SERPL-SCNC: 108 MMOL/L (ref 97–108)
CO2 SERPL-SCNC: 23 MMOL/L (ref 21–32)
COMMENT, HOLDF: NORMAL
CREAT SERPL-MCNC: 0.69 MG/DL (ref 0.55–1.02)
DIAGNOSIS, 93000: NORMAL
ERYTHROCYTE [DISTWIDTH] IN BLOOD BY AUTOMATED COUNT: 20.8 % (ref 11.5–14.5)
GLOBULIN SER CALC-MCNC: 3.9 G/DL (ref 2–4)
GLUCOSE SERPL-MCNC: 90 MG/DL (ref 65–100)
HCT VFR BLD AUTO: 23.3 % (ref 35–47)
HGB BLD-MCNC: 6.4 G/DL (ref 11.5–16)
MCH RBC QN AUTO: 19.1 PG (ref 26–34)
MCHC RBC AUTO-ENTMCNC: 27.5 G/DL (ref 30–36.5)
MCV RBC AUTO: 69.6 FL (ref 80–99)
NRBC # BLD: 0 K/UL (ref 0–0.01)
NRBC BLD-RTO: 0 PER 100 WBC
P-R INTERVAL, ECG05: 144 MS
PLATELET # BLD AUTO: 541 K/UL (ref 150–400)
PMV BLD AUTO: 9.4 FL (ref 8.9–12.9)
POTASSIUM SERPL-SCNC: 3.5 MMOL/L (ref 3.5–5.1)
PROT SERPL-MCNC: 7.4 G/DL (ref 6.4–8.2)
Q-T INTERVAL, ECG07: 362 MS
QRS DURATION, ECG06: 78 MS
QTC CALCULATION (BEZET), ECG08: 445 MS
RBC # BLD AUTO: 3.35 M/UL (ref 3.8–5.2)
SAMPLES BEING HELD,HOLD: NORMAL
SODIUM SERPL-SCNC: 141 MMOL/L (ref 136–145)
VENTRICULAR RATE, ECG03: 91 BPM
WBC # BLD AUTO: 4.5 K/UL (ref 3.6–11)

## 2019-05-15 PROCEDURE — 86923 COMPATIBILITY TEST ELECTRIC: CPT

## 2019-05-15 PROCEDURE — 93005 ELECTROCARDIOGRAM TRACING: CPT

## 2019-05-15 PROCEDURE — 85027 COMPLETE CBC AUTOMATED: CPT

## 2019-05-15 PROCEDURE — 86900 BLOOD TYPING SEROLOGIC ABO: CPT

## 2019-05-15 PROCEDURE — 85025 COMPLETE CBC W/AUTO DIFF WBC: CPT

## 2019-05-15 PROCEDURE — 36430 TRANSFUSION BLD/BLD COMPNT: CPT

## 2019-05-15 PROCEDURE — 80053 COMPREHEN METABOLIC PANEL: CPT

## 2019-05-15 PROCEDURE — 74011250637 HC RX REV CODE- 250/637: Performed by: EMERGENCY MEDICINE

## 2019-05-15 PROCEDURE — P9016 RBC LEUKOCYTES REDUCED: HCPCS

## 2019-05-15 PROCEDURE — 36415 COLL VENOUS BLD VENIPUNCTURE: CPT

## 2019-05-15 PROCEDURE — 99285 EMERGENCY DEPT VISIT HI MDM: CPT

## 2019-05-15 RX ORDER — ACETAMINOPHEN 500 MG
1000 TABLET ORAL
Status: COMPLETED | OUTPATIENT
Start: 2019-05-15 | End: 2019-05-15

## 2019-05-15 RX ORDER — FAMOTIDINE 20 MG/1
20 TABLET, FILM COATED ORAL 2 TIMES DAILY
Qty: 20 TAB | Refills: 0 | Status: SHIPPED | OUTPATIENT
Start: 2019-05-15 | End: 2019-05-25

## 2019-05-15 RX ORDER — SODIUM CHLORIDE 9 MG/ML
250 INJECTION, SOLUTION INTRAVENOUS AS NEEDED
Status: DISCONTINUED | OUTPATIENT
Start: 2019-05-15 | End: 2019-05-16 | Stop reason: HOSPADM

## 2019-05-15 RX ADMIN — ACETAMINOPHEN 1000 MG: 500 TABLET ORAL at 18:08

## 2019-05-15 NOTE — ED PROVIDER NOTES
37 y.o. female with past medical history significant for chronic hives, anemia and depression who presents from home via personal vehicle with chief complaint of SOB. Pt states 3 weeks ago she stopped taking her Zoloft prescribed to her for depression and anxiety. Pt states she saw her PCP last week who started her on Paxil which pt has been taking for 5 days. Pt states since being on Paxil she has been increasingly SOB with slight nausea and constipation. Pt states her hemoglobin has been low in the past and pt has been instructed to taske iron pills but has been non-compliant. Pt denies ever being seen by a hematologist. Pt states she has a history of heavy menstrual cycles and and blood transfusions. Pt denies vomiting, diarrhea, and blood in stool. There are no other acute medical concerns at this time. Social hx: No tobacco use, No EtOH use PCP: Dr. Dallas Denny MD  
 
Note written by Anny Malloy. [employer], as dictated by Yesica Gordon MD 4:40 PM 
 
 
 
 
The history is provided by the patient. No  was used. Past Medical History:  
Diagnosis Date  Anemia  Depression   
 self treats with essential oils  Other ill-defined conditions(799.89) chronic hives Past Surgical History:  
Procedure Laterality Date  HX GYN    
 cerclage x2  HX HEENT  02/2018  
 4 teeth removed Family History:  
Problem Relation Age of Onset  Cancer Mother  Diabetes Mother Social History Socioeconomic History  Marital status:  Spouse name: Not on file  Number of children: Not on file  Years of education: Not on file  Highest education level: Not on file Occupational History  Not on file Social Needs  Financial resource strain: Not on file  Food insecurity:  
  Worry: Not on file Inability: Not on file  Transportation needs:  
  Medical: Not on file Non-medical: Not on file Tobacco Use  
  Smoking status: Never Smoker  Smokeless tobacco: Never Used Substance and Sexual Activity  Alcohol use: No  
  Comment: rarely  Drug use: No  
 Sexual activity: Yes  
  Partners: Male Lifestyle  Physical activity:  
  Days per week: Not on file Minutes per session: Not on file  Stress: Not on file Relationships  Social connections:  
  Talks on phone: Not on file Gets together: Not on file Attends Mu-ism service: Not on file Active member of club or organization: Not on file Attends meetings of clubs or organizations: Not on file Relationship status: Not on file  Intimate partner violence:  
  Fear of current or ex partner: Not on file Emotionally abused: Not on file Physically abused: Not on file Forced sexual activity: Not on file Other Topics Concern  Not on file Social History Narrative  Not on file ALLERGIES: Patient has no known allergies. Review of Systems Constitutional: Negative for fever. HENT: Negative for facial swelling. Eyes: Negative for visual disturbance. Respiratory: Positive for shortness of breath. Negative for chest tightness. Cardiovascular: Negative for chest pain. Gastrointestinal: Positive for constipation and nausea. Negative for abdominal pain, blood in stool, diarrhea and vomiting. Genitourinary: Negative for difficulty urinating and dysuria. Musculoskeletal: Negative for arthralgias. Skin: Negative for rash. Neurological: Negative for dizziness. Hematological: Negative for adenopathy. Psychiatric/Behavioral: Negative for suicidal ideas. All other systems reviewed and are negative. Vitals:  
 05/15/19 1556 05/15/19 1636 BP:  151/77 Pulse:  98 Resp:  15 Temp:  99.2 °F (37.3 °C) SpO2: 100% 100% Physical Exam  
Constitutional: She is oriented to person, place, and time. She appears well-developed and well-nourished. No distress. Pt is pale appearing. HENT:  
Head: Normocephalic and atraumatic. Mouth/Throat: Oropharynx is clear and moist.  
Eyes: Pupils are equal, round, and reactive to light. No scleral icterus. Neck: Normal range of motion. Neck supple. No thyromegaly present. Cardiovascular: Normal rate, regular rhythm, normal heart sounds and intact distal pulses. No murmur heard. Pulmonary/Chest: Effort normal and breath sounds normal. No respiratory distress. Abdominal: Soft. Bowel sounds are normal. She exhibits no distension. There is no tenderness. Musculoskeletal: Normal range of motion. She exhibits no edema. Neurological: She is alert and oriented to person, place, and time. Skin: Skin is warm and dry. No rash noted. She is not diaphoretic. Nursing note and vitals reviewed. Note written by Laura Buitrago. Yael Meade, as dictated by Eber Starkey MD 4:56 PM 
 
MDM Number of Diagnoses or Management Options Microcytic anemia:  
Diagnosis management comments: A:  Symptomatic anemia. Microcytic. Has h/o anemia. Given 1unit PRBC with approx 1 gram increase in hgb level. Asymptomatic at this time. VS stable. Will discharge on iron. F/u with PCP for further work up. Procedures

## 2019-05-15 NOTE — TELEPHONE ENCOUNTER
Received stat labs and results given to Dr. Wanda Muir. Hemoglobin critical at 5.6. Per Dr. Wanda Muir I called and sent Ms. Francisco Stephens to the ER she requested to go to [facility]. Stat lab results scanned into chart.   Report called to [facility] ER

## 2019-05-15 NOTE — ED TRIAGE NOTES
Patient arrives for abnormal lab results. Patient stats that she recently saw her provider and they did lab work. Patient states she received a call and informed her to come to ER for hbg of 5. 
 
+ SOB Denies diarrhea, blood in stool, nausea, vomiting, chest pain

## 2019-05-15 NOTE — ED NOTES
1948: Verbal shift change report given to Marco Grossman RN (oncoming nurse) by Manuela Navarrete RN (offgoing nurse). Report included the following information SBAR, Kardex, ED Summary, [facility] and Recent Results.

## 2019-05-15 NOTE — LETTER
[address] 
[address] [place], [place] [id number] 
[phone] Work/School Note Date: 5/15/2019 To Whom It May concern: 
 
Marjan Palmer was seen and treated today in the emergency room by the following provider(s): 
Attending Provider: Jayesh Baig MD.   
 
Marjan Palmer may return to work Monday 05/20/2019 Sincerely, Janeth Olivier RN

## 2019-05-15 NOTE — PROGRESS NOTES
Admission Medication Reconciliation: 
Information obtained from: patient Significant PMH/Disease States:  
Past Medical History:  
Diagnosis Date Anemia Depression   
 self treats with essential oils Other ill-defined conditions(779.78) chronic hives Chief Complaint for this Admission:  shortness of breath Allergies:  Patient has no known allergies. Prior to Admission Medications:  
Prior to Admission Medications Prescriptions Last Dose Informant Patient Reported? Taking? PARoxetine (PAXIL) 10 mg tablet 5/15/2019 at Unknown time  No Yes Sig: Take 1 Tab by mouth daily. cetirizine (ZYRTEC) 10 mg tablet 5/14/2019 at Unknown time  Yes Yes Sig: Take 10 mg by mouth daily. ferrous sulfate (SLOW FE) 142 mg (45 mg iron) ER tablet 5/14/2019 at Unknown time  Yes Yes Sig: Take  by mouth Daily (before breakfast). melatonin 3 mg tablet   Yes No  
Sig: Take  by mouth. Facility-Administered Medications: None Comments/Recommendations: Medication review done with patient. Removed lorazepam.  Patient had a prescription for 5 tabs prn a year ago. Allergies reviewed.

## 2019-05-15 NOTE — PROGRESS NOTES
Chief Complaint Patient presents with  Epigastric Pain  Shortness of Breath 1. Have you been to the ER, urgent care clinic since your last visit? Hospitalized since your last visit? No 
 
2. Have you seen or consulted any other health care providers outside of the [address] [place] [address] since your last visit? Include any pap smears or colon screening. No  
 
Pt is fasting this visit. Pt reports that her symptoms started last week. Pt reports that she hasn't taken anything for it. Pain only occurs when she touches it. Pt reports that she is a little constipated. Health maintenance reviewed. Pt informed of health maintenance past due and/or upcoming. Pt verbalized understanding.

## 2019-05-15 NOTE — PROGRESS NOTES
Chief Complaint Patient presents with  Epigastric Pain  Shortness of Breath Pt reports that she feels fine so far with starting the Paxil. Pt reports epigastric tenderness with shortness of breath. Pt is concerned due to her mother recently having a small bowel obstruction. Pt reports that when she was recently traveling she had to stop when walking across the parking lot due to shortness of breath, will get short of breath walking across her house. Pt reports that epigastric pain feels more like gas. Will move around her stomach. Epigastric tenderness has improved from two days ago. Subjective: (As above and below) Chief Complaint Patient presents with  Epigastric Pain  Shortness of Breath  
 
she is a 37y.o. year old female who presents for evaluation. Reviewed PmHx, RxHx, FmHx, SocHx, AllgHx and updated in chart. Review of Systems - negative except as listed above Objective:  
 
Vitals:  
 05/15/19 0743 BP: 107/66 Pulse: 73 Resp: 16 Temp: 98.5 °F (36.9 °C) TempSrc: Oral  
SpO2: 100% Weight: 154 lb 6.4 oz (70 kg) Height: 5' 4\" (1.626 m) Physical Examination: General appearance - alert, well appearing, and in no distress Mental status - normal mood, behavior, speech, dress, motor activity, and thought processes Mouth - mucous membranes moist, pharynx normal without lesions Chest - clear to auscultation, no wheezes, rales or rhonchi, symmetric air entry Heart - normal rate, regular rhythm, normal S1, S2, no murmurs, rubs, clicks or gallops Abdomen - epigastric tenderness across upper abdomen Musculoskeletal - no joint tenderness, deformity or swelling Extremities - peripheral pulses normal, no pedal edema, no clubbing or cyanosis Assessment/ Plan:  
1. MOORE (dyspnea on exertion) 
-check labs, low risk for PE however symptom pattern is conerning - METABOLIC PANEL, COMPREHENSIVE 
- TSH 3RD GENERATION 
- VITAMIN D, 25 HYDROXY - D DIMER 
 
 2. Anemia, unspecified type 
-check labs, could be contributing to SOB 
- CBC W/O DIFF 
- IRON PROFILE 
- VITAMIN B12 
  
3. Dysthymia 
-continue on Paxil Follow up as needed I have discussed the diagnosis with the patient and the intended plan as seen in the above orders. The patient has received an after-visit summary and questions were answered concerning future plans. Medication Side Effects and Warnings were discussed with patient: yes Patient Labs were reviewed: yes Patient Past Records were reviewed:  yes Jorge Pavon M.D.

## 2019-05-15 NOTE — ED NOTES
Patient resting on stretcher with blood infusing. No complaints of pain or discomfort. Family at bedside. Monitoring x 3 continues. Call bell in reach bed low and locked. IV site without signs or redness or infiltration. Patient denies questions and complaints.

## 2019-05-16 LAB
ABO + RH BLD: NORMAL
BLD PROD TYP BPU: NORMAL
BLOOD GROUP ANTIBODIES SERPL: NORMAL
BPU ID: NORMAL
CROSSMATCH RESULT,%XM: NORMAL
SPECIMEN EXP DATE BLD: NORMAL
STATUS OF UNIT,%ST: NORMAL
UNIT DIVISION, %UDIV: 0

## 2019-05-16 NOTE — DISCHARGE INSTRUCTIONS
Patient Education        Iron Deficiency Anemia: Care Instructions  Your Care Instructions    Anemia means that you do not have enough red blood cells. Red blood cells carry oxygen around your body. When you have anemia, it can make you pale, weak, and tired. Many things can cause anemia. The most common cause is loss of blood. This can happen if you have heavy menstrual periods. It can also happen if you have bleeding in your stomach or bowel. You can also get anemia if you don't have enough iron in your diet or if it's hard for your body to absorb iron. In some cases, pregnancy causes anemia. That's because a pregnant woman needs more iron. Your doctor may do more tests to find the cause of your anemia. If a disease or other health problem is causing it, your doctor will treat that problem. It's important to follow up with your doctor to make sure that your iron level returns to normal.  Follow-up care is a key part of your treatment and safety. Be sure to make and go to all appointments, and call your doctor if you are having problems. It's also a good idea to know your test results and keep a list of the medicines you take. How can you care for yourself at home? · If your doctor recommended iron pills, take them as directed. ? Try to take the pills on an empty stomach. You can do this about 1 hour before or 2 hours after meals. But you may need to take iron with food to avoid an upset stomach. ? Do not take antacids or drink milk or anything with caffeine within 2 hours of when you take your iron. They can keep your body from absorbing the iron well. ? Vitamin C helps your body absorb iron. You may want to take iron pills with a glass of orange juice or some other food high in vitamin C.  ? Iron pills may cause stomach problems. These include heartburn, nausea, diarrhea, constipation, and cramps. It can help to drink plenty of fluids and include fruits, vegetables, and fiber in your diet.   ? It's normal for iron pills to make your stool a greenish or grayish black. But internal bleeding can also cause dark stool. So it's important to tell your doctor about any color changes. ? Call your doctor if you think you are having a problem with your iron pills. Even after you start to feel better, it will take several months for your body to build up its supply of iron. ? If you miss a pill, don't take a double dose. ? Keep iron pills out of the reach of small children. Too much iron can be very dangerous. · Eat foods with a lot of iron. These include red meat, shellfish, poultry, and eggs. They also include beans, raisins, whole-grain bread, and leafy green vegetables. · Steam your vegetables. This is the best way to prepare them if you want to get as much iron as possible. · Be safe with medicines. Do not take nonsteroidal anti-inflammatory pain relievers unless your doctor tells you to. These include aspirin, naproxen (Aleve), and ibuprofen (Advil, Motrin). · Liquid iron can stain your teeth. But you can mix it with water or juice and drink it with a straw. Then it won't get on your teeth. When should you call for help? Call 911 anytime you think you may need emergency care. For example, call if:    · You passed out (lost consciousness).    Call your doctor now or seek immediate medical care if:    · You are short of breath.     · You are dizzy or light-headed, or you feel like you may faint.     · You have new or worse bleeding.    Watch closely for changes in your health, and be sure to contact your doctor if:    · You feel weaker or more tired than usual.     · You do not get better as expected. Where can you learn more? Go to http://jacinto-marifer.info/. Enter W058 in the search box to learn more about \"Iron Deficiency Anemia: Care Instructions. \"  Current as of: May 6, 2018  Content Version: 11.9  © 1175-2604 [employer].  Care instructions adapted under license by [facility] (which disclaims liability or warranty for this information). If you have questions about a medical condition or this instruction, always ask your healthcare professional. [address] any warranty or liability for your use of this information. Patient Education        Learning About Blood Transfusions  What is a blood transfusion? Blood transfusion is a medical treatment to replace the blood or parts of blood that your body has lost. The blood goes through a tube from a bag to an intravenous (IV) catheter and into your vein. You may need a blood transfusion after losing blood from an injury, a major surgery, an illness that causes bleeding, or an illness that destroys blood cells. Transfusions are also used to give you the parts of blood--such as platelets, plasma, or substances that cause clotting--that your body needs to fight an illness or stop bleeding. How is a blood transfusion done? Before you receive a blood transfusion, your blood is tested to find out what your blood type is. Blood or blood parts that are a match with your blood type are ordered by your doctor. Blood is typed as A, B, AB, or O. It is also typed as Rh-positive or Rh-negative. Your blood is also screened to look for antibodies that might react with the blood that is given to you. The blood you are getting is checked and rechecked to make sure that it's the right type for you. A sample of your blood is mixed with a sample of the blood you will receive to check for problems. Before actually giving you the transfusion, a doctor and nurses will look at the label on the package of blood and compare it to your hospital ID bracelet and medical records. The transfusion begins only when all agree that this is the correct blood and that you are the correct person to receive it. To receive the transfusion, you will have an intravenous (IV) catheter inserted into a vein.  A tube connects the catheter to the bag containing the blood, which is placed higher than your body. The blood then flows slowly into your vein. A doctor or nurse will check you several times during the transfusion to watch for a reaction or other problems. What are the possible risks? Blood transfusions have many benefits and are often life-saving. But they also have a few risks. Possible risks include:  · Your body's reaction to receiving new blood. This may include:  ? Fever. ? Allergic reactions. ? Breathing problems. · An infection from the blood. This risk is small because of the strict rules placed on handling and storing blood. Getting a viral infection, such as HIV or hepatitis B or C, through blood transfusions has become very rare. The U.S. Food and Drug Administration (FDA) enforces strict guidelines on the collection, testing, storage, and use of blood. · Getting the wrong blood type by accident. Severe reactions, which can be life-threatening, are very rare. What can you expect after a blood transfusion? Here are some things you can do at home to help prevent infection at the transfusion site:  · Wash the area daily with warm, soapy water, and pat it dry. Don't use hydrogen peroxide or alcohol, which can slow healing. You may cover the area with a gauze bandage if it weeps or rubs against clothing. Change the bandage every day. · Keep the area clean and dry. When should you call for help? Call 911 anytime you think you may need emergency care. For example, call if:  · You have severe trouble breathing. Call your doctor now or seek immediate medical care if:  · You have a fever. · You feel weaker or more tired than usual.  · You have a yellow tint to your skin or the whites of your eyes. Watch closely for changes in your health, and be sure to contact your doctor if you have any problems. Follow-up care is a key part of your treatment and safety.  Be sure to make and go to all appointments, and call your doctor if you are having problems. It's also a good idea to know your test results and keep a list of the medicines you take. Where can you learn more? Go to http://jacinto-marifer.info/. Enter V588 in the search box to learn more about \"Learning About Blood Transfusions. \"  Current as of: May 6, 2018  Content Version: 11.9  © 3046-2247 [employer]. Care instructions adapted under license by [employer] (which disclaims liability or warranty for this information). If you have questions about a medical condition or this instruction, always ask your healthcare professional. [place] [address] any warranty or liability for your use of this information.

## 2019-05-17 LAB
BASOPHILS # BLD: 0 K/UL (ref 0–0.1)
BASOPHILS NFR BLD: 0 % (ref 0–1)
DIFFERENTIAL METHOD BLD: ABNORMAL
EOSINOPHIL # BLD: 0 K/UL (ref 0–0.4)
EOSINOPHIL NFR BLD: 1 % (ref 0–7)
ERYTHROCYTE [DISTWIDTH] IN BLOOD BY AUTOMATED COUNT: 19.1 % (ref 11.5–14.5)
HCT VFR BLD AUTO: 21.3 % (ref 35–47)
HGB BLD-MCNC: 5.5 G/DL (ref 11.5–16)
IMM GRANULOCYTES # BLD AUTO: 0 K/UL (ref 0–0.04)
IMM GRANULOCYTES NFR BLD AUTO: 1 % (ref 0–0.5)
LYMPHOCYTES # BLD: 0.9 K/UL (ref 0.8–3.5)
LYMPHOCYTES NFR BLD: 23 % (ref 12–49)
MCH RBC QN AUTO: 17.5 PG (ref 26–34)
MCHC RBC AUTO-ENTMCNC: 25.8 G/DL (ref 30–36.5)
MCV RBC AUTO: 67.6 FL (ref 80–99)
MONOCYTES # BLD: 0.5 K/UL (ref 0–1)
MONOCYTES NFR BLD: 12 % (ref 5–13)
NEUTS SEG # BLD: 2.4 K/UL (ref 1.8–8)
NEUTS SEG NFR BLD: 63 % (ref 32–75)
NRBC # BLD: 0 K/UL (ref 0–0.01)
NRBC BLD-RTO: 0 PER 100 WBC
PATH REV BLD -IMP: ABNORMAL
PLATELET # BLD AUTO: 624 K/UL (ref 150–400)
PMV BLD AUTO: 9.6 FL (ref 8.9–12.9)
RBC # BLD AUTO: 3.15 M/UL (ref 3.8–5.2)
RBC MORPH BLD: ABNORMAL
WBC # BLD AUTO: 3.8 K/UL (ref 3.6–11)
WBC MORPH BLD: ABNORMAL

## 2019-05-21 LAB
25(OH)D3+25(OH)D2 SERPL-MCNC: 23.2 NG/ML (ref 30–100)
ALBUMIN SERPL-MCNC: 3.9 G/DL (ref 3.5–5.5)
ALBUMIN/GLOB SERPL: 1.3 {RATIO} (ref 1.2–2.2)
ALP SERPL-CCNC: 130 IU/L (ref 39–117)
ALT SERPL-CCNC: 56 IU/L (ref 0–32)
AST SERPL-CCNC: 48 IU/L (ref 0–40)
BILIRUB SERPL-MCNC: 0.7 MG/DL (ref 0–1.2)
BUN SERPL-MCNC: 11 MG/DL (ref 6–24)
BUN/CREAT SERPL: 15 (ref 9–23)
CALCIUM SERPL-MCNC: 9.1 MG/DL (ref 8.7–10.2)
CHLORIDE SERPL-SCNC: 106 MMOL/L (ref 96–106)
CO2 SERPL-SCNC: 24 MMOL/L (ref 20–29)
CREAT SERPL-MCNC: 0.75 MG/DL (ref 0.57–1)
D DIMER PPP FEU-MCNC: 3.03 MG/L FEU (ref 0–0.49)
ERYTHROCYTE [DISTWIDTH] IN BLOOD BY AUTOMATED COUNT: 18.9 % (ref 12.3–15.4)
GLOBULIN SER CALC-MCNC: 2.9 G/DL (ref 1.5–4.5)
GLUCOSE SERPL-MCNC: 91 MG/DL (ref 65–99)
HCT VFR BLD AUTO: 19.2 % (ref 34–46.6)
HGB BLD-MCNC: 5.6 G/DL (ref 11.1–15.9)
IRON SATN MFR SERPL: 3 % (ref 15–55)
IRON SERPL-MCNC: 12 UG/DL (ref 27–159)
MCH RBC QN AUTO: 17.8 PG (ref 26.6–33)
MCHC RBC AUTO-ENTMCNC: 29.2 G/DL (ref 31.5–35.7)
MCV RBC AUTO: 61 FL (ref 79–97)
MORPHOLOGY BLD-IMP: ABNORMAL
PLATELET # BLD AUTO: 750 X10E3/UL (ref 150–379)
POTASSIUM SERPL-SCNC: 5 MMOL/L (ref 3.5–5.2)
PROT SERPL-MCNC: 6.8 G/DL (ref 6–8.5)
RBC # BLD AUTO: 3.15 X10E6/UL (ref 3.77–5.28)
SODIUM SERPL-SCNC: 141 MMOL/L (ref 134–144)
TIBC SERPL-MCNC: 350 UG/DL (ref 250–450)
TSH SERPL DL<=0.005 MIU/L-ACNC: 1.66 UIU/ML (ref 0.45–4.5)
UIBC SERPL-MCNC: 338 UG/DL (ref 131–425)
VIT B12 SERPL-MCNC: 453 PG/ML (ref 232–1245)
WBC # BLD AUTO: 3 X10E3/UL (ref 3.4–10.8)

## 2020-01-04 DIAGNOSIS — F34.1 DYSTHYMIA: ICD-10-CM

## 2020-01-05 RX ORDER — PAROXETINE 10 MG/1
TABLET, FILM COATED ORAL
Qty: 30 TAB | Refills: 5 | Status: SHIPPED | OUTPATIENT
Start: 2020-01-05 | End: 2020-03-26 | Stop reason: SDUPTHER

## 2020-03-26 ENCOUNTER — VIRTUAL VISIT (OUTPATIENT)
Dept: FAMILY MEDICINE CLINIC | Age: 45
End: 2020-03-26

## 2020-03-26 VITALS — BODY MASS INDEX: 25.27 KG/M2 | WEIGHT: 148 LBS | HEIGHT: 64 IN

## 2020-03-26 DIAGNOSIS — F34.1 DYSTHYMIA: ICD-10-CM

## 2020-03-26 RX ORDER — PAROXETINE 10 MG/1
10 TABLET, FILM COATED ORAL DAILY
Qty: 30 TAB | Refills: 5 | Status: SHIPPED | OUTPATIENT
Start: 2020-03-26 | End: 2022-02-01 | Stop reason: ALTCHOICE

## 2020-03-26 RX ORDER — LANOLIN ALCOHOL/MO/W.PET/CERES
CREAM (GRAM) TOPICAL
COMMUNITY
End: 2022-05-13

## 2020-03-26 NOTE — PROGRESS NOTES
Chief Complaint   Patient presents with    Other     Emotional support animal     Medication Evaluation     paxil      Pt scheduled a visit to discuss letter for emotional support animal, pt has a may doodle, reports that it helps with her anxiety. Pt is working to wean on paxil, following previously provided instructions (please see ZuzuChe message)    Pt reports that she recently had to be away from her dog for several days, increased her anxiety. Sean Franklin is a 40 y.o. female who was seen by synchronous (real-time) audio-video technology on 3/26/2020. Consent:  She and/or her healthcare decision maker is aware that this patient-initiated Telehealth encounter is a billable service, with coverage as determined by her insurance carrier. She is aware that she may receive a bill and has provided verbal consent to proceed: yes    I was in the office while conducting this encounter. Assessment & Plan:   Diagnoses and all orders for this visit:    1. Dysthymia  -     PARoxetine (PAXIL) 10 mg tablet; Take 1 Tab by mouth daily. 712  Subjective:   Sean Franklin was seen for Other (Emotional support animal ) and Medication Evaluation (paxil )    -see above  Prior to Admission medications    Medication Sig Start Date End Date Taking? Authorizing Provider   ferrous sulfate (Iron) 325 mg (65 mg iron) tablet Take  by mouth Daily (before breakfast). Yes Provider, Historical   PARoxetine (PAXIL) 10 mg tablet TAKE 1 TABLET BY MOUTH DAILY 1/5/20  Yes Gavin Butts MD   melatonin 3 mg tablet Take  by mouth. Yes Provider, Historical   cetirizine (ZYRTEC) 10 mg tablet Take 10 mg by mouth daily. Yes Gavin Bui MD     No Known Allergies    Patient Active Problem List   Diagnosis Code    Anemia D64.9    Depression F32.9     Current Outpatient Medications   Medication Sig Dispense Refill    ferrous sulfate (Iron) 325 mg (65 mg iron) tablet Take  by mouth Daily (before breakfast).  PARoxetine (PAXIL) 10 mg tablet Take 1 Tab by mouth daily. 30 Tab 5    melatonin 3 mg tablet Take  by mouth.  cetirizine (ZYRTEC) 10 mg tablet Take 10 mg by mouth daily. No Known Allergies    Review of Systems     PHYSICAL EXAMINATION:  [ INSTRUCTIONS:  \"[x]\" Indicates a positive item  \"[]\" Indicates a negative item  -- DELETE ALL ITEMS NOT EXAMINED]  Vital Signs: (As obtained by patient/caregiver at home)  Visit Vitals  Ht 5' 4\" (1.626 m)   Wt 148 lb (67.1 kg)   LMP 03/10/2020   BMI 25.40 kg/m²        Constitutional: [x] Appears well-developed and well-nourished [x] No apparent distress      [] Abnormal -     Mental status: [x] Alert and awake  [x] Oriented to person/place/time [x] Able to follow commands    [] Abnormal -     Eyes:   EOM    [x]  Normal    [] Abnormal -   Sclera  [x]  Normal    [] Abnormal -          Discharge [x]  None visible   [] Abnormal -     HENT: [x] Normocephalic, atraumatic  [] Abnormal -   [x] Mouth/Throat: Mucous membranes are moist    External Ears [x] Normal  [] Abnormal -    Neck: [x] No visualized mass [] Abnormal -     Pulmonary/Chest: [x] Respiratory effort normal   [x] No visualized signs of difficulty breathing or respiratory distress        [] Abnormal -      Musculoskeletal:   [x] Normal gait with no signs of ataxia         [x] Normal range of motion of neck        [] Abnormal -     Neurological:        [x] No Facial Asymmetry (Cranial nerve 7 motor function) (limited exam due to video visit)          [x] No gaze palsy        [] Abnormal -          Skin:        [x] No significant exanthematous lesions or discoloration noted on facial skin         [] Abnormal -            Psychiatric:       [x] Normal Affect [] Abnormal -        [x] No Hallucinations    Other pertinent observable physical exam findings:-        We discussed the expected course, resolution and complications of the diagnosis(es) in detail.   Medication risks, benefits, costs, interactions, and alternatives were discussed as indicated. I advised her to contact the office if her condition worsens, changes or fails to improve as anticipated. She expressed understanding with the diagnosis(es) and plan. Pursuant to the emergency declaration under the [facility], [facility] waiver authority and the [employer] and [employer] Act, this Virtual  Visit was conducted, with patient's consent, to reduce the patient's risk of exposure to COVID-19 and provide continuity of care for an established patient. Services were provided through a video synchronous discussion virtually to substitute for in-person clinic visit.     Kaleb Lambert MD

## 2020-03-26 NOTE — PROGRESS NOTES
Chief Complaint   Patient presents with    Other     Emotional support animal     Medication Evaluation     paxil      1. Have you been to the ER, urgent care clinic since your last visit? Hospitalized since your last visit? No    2. Have you seen or consulted any other health care providers outside of the [address] [place] [address] since your last visit? Include any pap smears or colon screening.  No    Health Maintenance Due   Topic Date Due    DTaP/Tdap/Td series (1 - Tdap) 11/26/1996    PAP AKA CERVICAL CYTOLOGY  11/26/1996    Lipid Screen  11/26/2015

## 2021-10-04 ENCOUNTER — VIRTUAL VISIT (OUTPATIENT)
Dept: FAMILY MEDICINE CLINIC | Age: 46
End: 2021-10-04
Payer: MEDICAID

## 2021-10-04 DIAGNOSIS — Z86.018 H/O UTERINE LEIOMYOMA: Primary | ICD-10-CM

## 2021-10-04 DIAGNOSIS — N94.6 PAINFUL MENSTRUAL PERIODS: ICD-10-CM

## 2021-10-04 PROCEDURE — 99214 OFFICE O/P EST MOD 30 MIN: CPT | Performed by: FAMILY MEDICINE

## 2021-10-04 NOTE — PROGRESS NOTES
Naomi Hauser is a 39 y.o. female  Chief Complaint   Patient presents with    Abdominal Pain     Health Maintenance Due   Topic Date Due    Hepatitis C Screening  Never done    COVID-19 Vaccine (1) Never done    DTaP/Tdap/Td series (1 - Tdap) Never done    Cervical cancer screen  Never done    Lipid Screen  Never done    Colorectal Cancer Screening Combo  Never done    Flu Vaccine (1) Never done     There were no vitals taken for this visit.     Patient-Reported Vitals 10/4/2021   Patient-Reported Weight 138.6   Patient-Reported Height -   Patient-Reported Temperature 98.6   Patient-Reported LMP -       Patient Phone Number/Email:  [phone]

## 2021-10-04 NOTE — PROGRESS NOTES
Chief Complaint   Patient presents with    Abdominal Pain     Pt was seen via virtual video visit. Patient reports that she has been having abdominal pain, predominantly associated with her menstrual cycles for several months. Patient reports that her periods have gotten heavier and significantly more uncomfortable. Patient reports that she has significant abdominal bloating and discomfort before and during her menstrual cycle. Patient was previously told that she had 3 fibroids at the time of a miscarriage prior to the birth of her 14-year-old. Patient is concerned that they have potentially gotten larger or that she is approaching menopause. Lester Evans is a 39 y.o. female who was seen by synchronous (real-time) audio-video technology on 10/4/2021 for Abdominal Pain        Assessment & Plan:   Diagnoses and all orders for this visit:    1. H/O uterine leiomyoma  -     US TRANSVAGINAL; Future    2. Painful menstrual periods  -     US TRANSVAGINAL; Future  -     METABOLIC PANEL, COMPREHENSIVE; Future  -     CBC WITH AUTOMATED DIFF; Future  -     TSH 3RD GENERATION; Future  -     VITAMIN D, 25 HYDROXY; Future        Ultrasound ordered to follow-up on previous history of uterine fibroids in the setting of newly changed menstrual cycles. Patient was also scheduled for routine lab work to evaluate for potential underlying causes of her current symptoms. Will determine additional follow-up or recommendations based on these results. Subjective:       Prior to Admission medications    Medication Sig Start Date End Date Taking? Authorizing Provider   ferrous sulfate (Iron) 325 mg (65 mg iron) tablet Take  by mouth Daily (before breakfast). Yes Provider, Historical   melatonin 3 mg tablet Take  by mouth. Yes Provider, Historical   cetirizine (ZYRTEC) 10 mg tablet Take 10 mg by mouth daily. Yes Other, MD Gavin   PARoxetine (PAXIL) 10 mg tablet Take 1 Tab by mouth daily.   Patient not taking: Reported on 10/4/2021 3/26/20   Risser, Lestine Dance, MD     Current Outpatient Medications   Medication Sig Dispense Refill    ferrous sulfate (Iron) 325 mg (65 mg iron) tablet Take  by mouth Daily (before breakfast).  melatonin 3 mg tablet Take  by mouth.  cetirizine (ZYRTEC) 10 mg tablet Take 10 mg by mouth daily.  PARoxetine (PAXIL) 10 mg tablet Take 1 Tab by mouth daily. (Patient not taking: Reported on 10/4/2021) 30 Tab 5     No Known Allergies    Review of Systems   Constitutional: Negative for chills, fever and malaise/fatigue. HENT: Negative for congestion and sore throat. Respiratory: Negative for cough and shortness of breath. Cardiovascular: Negative for chest pain and palpitations. Gastrointestinal: Negative for abdominal pain, heartburn, nausea and vomiting. Genitourinary: Negative for dysuria and urgency. Musculoskeletal: Negative for joint pain and myalgias. Neurological: Negative for dizziness, tingling and headaches. All other systems reviewed and are negative.       Objective:     Patient-Reported Vitals 10/4/2021   Patient-Reported Weight 138.6   Patient-Reported Height -   Patient-Reported Temperature 98.6   Patient-Reported LMP -        [INSTRUCTIONS:  \"[x]\" Indicates a positive item  \"[]\" Indicates a negative item  -- DELETE ALL ITEMS NOT EXAMINED]    Constitutional: [x] Appears well-developed and well-nourished [x] No apparent distress      [] Abnormal -     Mental status: [x] Alert and awake  [x] Oriented to person/place/time [x] Able to follow commands    [] Abnormal -     Eyes:   EOM    [x]  Normal    [] Abnormal -   Sclera  [x]  Normal    [] Abnormal -          Discharge [x]  None visible   [] Abnormal -     HENT: [x] Normocephalic, atraumatic  [] Abnormal -   [x] Mouth/Throat: Mucous membranes are moist    External Ears [x] Normal  [] Abnormal -    Neck: [x] No visualized mass [] Abnormal -     Pulmonary/Chest: [x] Respiratory effort normal   [x] No visualized signs of difficulty breathing or respiratory distress        [] Abnormal -      Musculoskeletal:   [x] Normal gait with no signs of ataxia         [x] Normal range of motion of neck        [] Abnormal -     Neurological:        [x] No Facial Asymmetry (Cranial nerve 7 motor function) (limited exam due to video visit)          [x] No gaze palsy        [] Abnormal -          Skin:        [x] No significant exanthematous lesions or discoloration noted on facial skin         [] Abnormal -            Psychiatric:       [x] Normal Affect [] Abnormal -        [x] No Hallucinations    Other pertinent observable physical exam findings:-        We discussed the expected course, resolution and complications of the diagnosis(es) in detail. Medication risks, benefits, costs, interactions, and alternatives were discussed as indicated. I advised her to contact the office if her condition worsens, changes or fails to improve as anticipated. She expressed understanding with the diagnosis(es) and plan. Ana Mcdonough, was evaluated through a synchronous (real-time) audio-video encounter. The patient (or guardian if applicable) is aware that this is a billable service. Verbal consent to proceed has been obtained within the past 12 months. The visit was conducted pursuant to the emergency declaration under the [address] [place] [address] authority and the [employer] and [employer] General Act. Patient identification was verified, and a caregiver was present when appropriate. The patient was located in a state where the provider was credentialed to provide care.       Missael Gonzales MD

## 2021-10-05 ENCOUNTER — LAB ONLY (OUTPATIENT)
Dept: FAMILY MEDICINE CLINIC | Age: 46
End: 2021-10-05

## 2021-10-05 DIAGNOSIS — N94.6 PAINFUL MENSTRUAL PERIODS: ICD-10-CM

## 2021-10-06 LAB
25(OH)D3 SERPL-MCNC: 32.5 NG/ML (ref 30–100)
ALBUMIN SERPL-MCNC: 3.8 G/DL (ref 3.5–5)
ALBUMIN/GLOB SERPL: 0.9 {RATIO} (ref 1.1–2.2)
ALP SERPL-CCNC: 70 U/L (ref 45–117)
ALT SERPL-CCNC: 11 U/L (ref 12–78)
ANION GAP SERPL CALC-SCNC: 5 MMOL/L (ref 5–15)
AST SERPL-CCNC: 8 U/L (ref 15–37)
BASOPHILS # BLD: 0 K/UL (ref 0–0.1)
BASOPHILS NFR BLD: 1 % (ref 0–1)
BILIRUB SERPL-MCNC: 0.5 MG/DL (ref 0.2–1)
BUN SERPL-MCNC: 11 MG/DL (ref 6–20)
BUN/CREAT SERPL: 13 (ref 12–20)
CALCIUM SERPL-MCNC: 9.2 MG/DL (ref 8.5–10.1)
CHLORIDE SERPL-SCNC: 107 MMOL/L (ref 97–108)
CO2 SERPL-SCNC: 27 MMOL/L (ref 21–32)
COMMENT, HOLDF: NORMAL
CREAT SERPL-MCNC: 0.83 MG/DL (ref 0.55–1.02)
DIFFERENTIAL METHOD BLD: ABNORMAL
EOSINOPHIL # BLD: 0.1 K/UL (ref 0–0.4)
EOSINOPHIL NFR BLD: 3 % (ref 0–7)
ERYTHROCYTE [DISTWIDTH] IN BLOOD BY AUTOMATED COUNT: 18.5 % (ref 11.5–14.5)
GLOBULIN SER CALC-MCNC: 4.1 G/DL (ref 2–4)
GLUCOSE SERPL-MCNC: 91 MG/DL (ref 65–100)
HCT VFR BLD AUTO: 38.6 % (ref 35–47)
HGB BLD-MCNC: 11.4 G/DL (ref 11.5–16)
IMM GRANULOCYTES # BLD AUTO: 0 K/UL (ref 0–0.04)
IMM GRANULOCYTES NFR BLD AUTO: 0 % (ref 0–0.5)
LYMPHOCYTES # BLD: 1.5 K/UL (ref 0.8–3.5)
LYMPHOCYTES NFR BLD: 37 % (ref 12–49)
MCH RBC QN AUTO: 27.4 PG (ref 26–34)
MCHC RBC AUTO-ENTMCNC: 29.5 G/DL (ref 30–36.5)
MCV RBC AUTO: 92.8 FL (ref 80–99)
MONOCYTES # BLD: 0.5 K/UL (ref 0–1)
MONOCYTES NFR BLD: 12 % (ref 5–13)
NEUTS SEG # BLD: 1.8 K/UL (ref 1.8–8)
NEUTS SEG NFR BLD: 47 % (ref 32–75)
NRBC # BLD: 0 K/UL (ref 0–0.01)
NRBC BLD-RTO: 0 PER 100 WBC
PLATELET # BLD AUTO: 338 K/UL (ref 150–400)
PMV BLD AUTO: 10 FL (ref 8.9–12.9)
POTASSIUM SERPL-SCNC: 3.9 MMOL/L (ref 3.5–5.1)
PROT SERPL-MCNC: 7.9 G/DL (ref 6.4–8.2)
RBC # BLD AUTO: 4.16 M/UL (ref 3.8–5.2)
SAMPLES BEING HELD,HOLD: NORMAL
SODIUM SERPL-SCNC: 139 MMOL/L (ref 136–145)
TSH SERPL DL<=0.05 MIU/L-ACNC: 0.98 UIU/ML (ref 0.36–3.74)
WBC # BLD AUTO: 3.9 K/UL (ref 3.6–11)

## 2021-10-07 ENCOUNTER — HOSPITAL ENCOUNTER (OUTPATIENT)
Dept: ULTRASOUND IMAGING | Age: 46
Discharge: HOME | End: 2021-10-07
Attending: FAMILY MEDICINE
Payer: MEDICAID

## 2021-10-07 DIAGNOSIS — N94.6 PAINFUL MENSTRUAL PERIODS: ICD-10-CM

## 2021-10-07 DIAGNOSIS — Z86.018 H/O UTERINE LEIOMYOMA: ICD-10-CM

## 2021-10-07 PROCEDURE — 76830 TRANSVAGINAL US NON-OB: CPT

## 2021-10-18 DIAGNOSIS — D21.9 FIBROID: Primary | ICD-10-CM

## 2022-01-25 ENCOUNTER — TELEPHONE (OUTPATIENT)
Dept: FAMILY MEDICINE CLINIC | Age: 47
End: 2022-01-25

## 2022-01-25 DIAGNOSIS — F41.9 ANXIETY: Primary | ICD-10-CM

## 2022-01-25 RX ORDER — HYDROXYZINE PAMOATE 25 MG/1
25 CAPSULE ORAL
Qty: 60 CAPSULE | Refills: 1 | Status: SHIPPED | OUTPATIENT
Start: 2022-01-25 | End: 2022-11-02

## 2022-01-25 NOTE — TELEPHONE ENCOUNTER
----- Message from Sofia Vasquez sent at 1/25/2022 12:30 PM EST -----  Regarding: Prescription request   Hello. I cant say that it is more than half the days of the week always. Some weeks are better than others. Around the time of my menstrual cycle is the worse as I experience nausea and diarrhea. That being said, Id like to try your recommendation.     Thank you,  Josh Maloney

## 2022-02-01 ENCOUNTER — VIRTUAL VISIT (OUTPATIENT)
Dept: FAMILY MEDICINE CLINIC | Age: 47
End: 2022-02-01
Payer: MEDICAID

## 2022-02-01 DIAGNOSIS — D50.9 IRON DEFICIENCY ANEMIA, UNSPECIFIED IRON DEFICIENCY ANEMIA TYPE: ICD-10-CM

## 2022-02-01 DIAGNOSIS — D25.9 UTERINE LEIOMYOMA, UNSPECIFIED LOCATION: ICD-10-CM

## 2022-02-01 DIAGNOSIS — R19.8 ALTERNATING CONSTIPATION AND DIARRHEA: Primary | ICD-10-CM

## 2022-02-01 DIAGNOSIS — Z12.11 COLON CANCER SCREENING: ICD-10-CM

## 2022-02-01 PROCEDURE — 99213 OFFICE O/P EST LOW 20 MIN: CPT | Performed by: NURSE PRACTITIONER

## 2022-02-01 NOTE — PROGRESS NOTES
Chief Complaint   Patient presents with    Constipation    Diarrhea     ,Health Maintenance reviewed     1. Have you been to the ER, urgent care clinic since your last visit? Hospitalized since your last visit? No     2. Have you seen or consulted any other health care providers outside of the [address] [place] [address] since your last visit? Include any pap smears or colon screening.   No

## 2022-02-01 NOTE — PROGRESS NOTES
Bowen Freitas (: 1975) is a 55 y.o. female, established patient, here for evaluation of the following chief complaint(s):   Constipation and Diarrhea       ASSESSMENT/PLAN:  Below is the assessment and plan developed based on review of pertinent history, labs, studies, and medications. 1. Alternating constipation and diarrhea  -     REFERRAL TO GASTROENTEROLOGY  2. Uterine leiomyoma, unspecified location  3. Iron deficiency anemia, unspecified iron deficiency anemia type  -     CBC WITH AUTOMATED DIFF; Future  -     IRON PROFILE; Future  -     FERRITIN; Future  4. Colon cancer screening  -     REFERRAL TO GASTROENTEROLOGY      Due for colonoscopy and having GI complaints. Gave her referral to GI  Hold PO iron for now. Labs in two weeks in office. If iron low, consider IV iron to help with GI side effects. Could take daily stool softener (colace) with increase water. Discussed healthy diet. Needs to see GYN regarding uterine fibroids, which could be contributing to her GI symptoms. Encouraged healthy diet and exercise. Return in about 2 weeks (around 2/15/2022), or if symptoms worsen or fail to improve, for labs. SUBJECTIVE/OBJECTIVE:  HPI     GI symptoms for \"years\". Thinks that iron by mouth may have been contributing. Gas, bloating, constipation usually  Last week had diarrhea for two days. Notes that around her menses she tends to have some irregular bowels. Stopped taking her iron to see if symptoms improved and did not seem to help much but she was only able to hold for 7 days and then restarted but did not want to be off of it for too long in fear of her Hgb dropping too low. She also notes that her GI symptoms do get exacerbated when she is feeling anxious. Started taking hydroxyzine for anxiety recently     Notes that her menses cycles are irregular as well, sometimes heavy, sometimes heavy cramping. She had vaginal US in October and that showed uterine fibroids. Has not seen GYN                  Review of Systems   Gen: +fatigue, -fever,- chills  Eyes: no excessive tearing, itching, or discharge  Nose: no rhinorrhea, no sinus pain  Mouth: no oral lesions, no sore throat  Resp: no shortness of breath, no wheezing, no cough  CV: no chest pain, no paroxysmal nocturnal dyspnea  Neuro: no headaches, no syncope or presyncopal episodes  Endo: no polyuria, no polydipsia  Heme: no lymphadenopathy, no easy bruising or bleeding              Patient-Reported Temperature: 97.9  Patient-Reported Weight: 133  Patient-Reported LMP: 1/27/22       Physical Exam    [INSTRUCTIONS:  \"[x]\" Indicates a positive item  \"[]\" Indicates a negative item  -- DELETE ALL ITEMS NOT EXAMINED]    Constitutional: [x] Appears well-developed and well-nourished [x] No apparent distress      [] Abnormal -     Mental status: [x] Alert and awake  [x] Oriented to person/place/time [x] Able to follow commands    [] Abnormal -     Eyes:   EOM    [x]  Normal    [] Abnormal -   Sclera  [x]  Normal    [] Abnormal -          Discharge [x]  None visible   [] Abnormal -     HENT: [x] Normocephalic, atraumatic  [] Abnormal -   [x] Mouth/Throat: Mucous membranes are moist    External Ears [x] Normal  [] Abnormal -    Neck: [x] No visualized mass [] Abnormal -     Pulmonary/Chest: [x] Respiratory effort normal   [x] No visualized signs of difficulty breathing or respiratory distress        [] Abnormal -      Musculoskeletal:   [x] Normal gait with no signs of ataxia         [x] Normal range of motion of neck        [] Abnormal -     Neurological:        [x] No Facial Asymmetry (Cranial nerve 7 motor function) (limited exam due to video visit)          [x] No gaze palsy        [] Abnormal -          Skin:        [x] No significant exanthematous lesions or discoloration noted on facial skin         [] Abnormal -            Psychiatric:       [x] Normal Affect [] Abnormal -        [x] No Hallucinations              Diamond December Delfina Crowder, was evaluated through a synchronous (real-time) audio-video encounter. The patient (or guardian if applicable) is aware that this is a billable service, which includes applicable co-pays. Verbal consent to proceed has been obtained. The visit was conducted pursuant to the emergency declaration under the [address] [place] [address] authority and the [employer] and [employer] Act. Patient identification was verified, and a caregiver was present when appropriate. The patient was located at home in a state where the provider was licensed to provide care. An electronic signature was used to authenticate this note.   -- Oliver Hernandez NP

## 2022-03-30 RX ORDER — SODIUM CHLORIDE 9 MG/ML
25 INJECTION, SOLUTION INTRAVENOUS CONTINUOUS
Status: DISPENSED | OUTPATIENT
Start: 2022-04-05 | End: 2022-04-05

## 2022-03-31 RX ORDER — SODIUM CHLORIDE 9 MG/ML
25 INJECTION, SOLUTION INTRAVENOUS CONTINUOUS
Status: DISPENSED | OUTPATIENT
Start: 2022-04-07 | End: 2022-04-07

## 2022-04-04 RX ORDER — SODIUM CHLORIDE 9 MG/ML
25 INJECTION, SOLUTION INTRAVENOUS CONTINUOUS
Status: DISCONTINUED | OUTPATIENT
Start: 2022-04-11 | End: 2022-04-12 | Stop reason: HOSPADM

## 2022-04-05 ENCOUNTER — HOSPITAL ENCOUNTER (OUTPATIENT)
Dept: INFUSION THERAPY | Age: 47
Discharge: HOME | End: 2022-04-05

## 2022-04-05 RX ORDER — SODIUM CHLORIDE 9 MG/ML
25 INJECTION, SOLUTION INTRAVENOUS CONTINUOUS
Status: DISCONTINUED | OUTPATIENT
Start: 2022-04-08 | End: 2022-04-09 | Stop reason: HOSPADM

## 2022-04-06 RX ORDER — SODIUM CHLORIDE 9 MG/ML
25 INJECTION, SOLUTION INTRAVENOUS CONTINUOUS
Status: DISCONTINUED | OUTPATIENT
Start: 2022-04-13 | End: 2022-04-14 | Stop reason: HOSPADM

## 2022-04-07 ENCOUNTER — HOSPITAL ENCOUNTER (OUTPATIENT)
Dept: INFUSION THERAPY | Age: 47
Discharge: HOME | End: 2022-04-07

## 2022-04-08 ENCOUNTER — HOSPITAL ENCOUNTER (OUTPATIENT)
Dept: INFUSION THERAPY | Age: 47
Discharge: HOME | End: 2022-04-08

## 2022-04-11 ENCOUNTER — HOSPITAL ENCOUNTER (OUTPATIENT)
Dept: INFUSION THERAPY | Age: 47
Discharge: HOME | End: 2022-04-11

## 2022-04-12 NOTE — PROGRESS NOTES
Chief Complaint Patient presents with  Anxiety  Numbness  
  right hand numbness and pain  Headache  Decreased Appetite Pt reports that she stopped her Zoloft suddenly, was feeling better and decided to try to go without medication. Pt stopped 3-4 weeks ago. Pt reports that she is easily tearful. 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 Pt also reports frequent headaches, stopped drinking caffeine. Patient does report a side effect with Zoloft of decreased sexual desire and inability to orgasm. Subjective: (As above and below) Chief Complaint Patient presents with  Anxiety  Numbness  
  right hand numbness and pain  Headache  Decreased Appetite  
 
she is a 37y.o. year old female who presents for evaluation. Reviewed PmHx, RxHx, FmHx, SocHx, AllgHx and updated in chart. Review of Systems - negative except as listed above Objective:  
 
Vitals:  
 05/09/19 1415 BP: 107/71 Pulse: 90 Resp: 17 Temp: 98.5 °F (36.9 °C) TempSrc: Oral  
SpO2: 100% Weight: 154 lb (69.9 kg) Height: 5' 4\" (1.626 m) Physical Examination: General appearance - alert, well appearing, and in no distress Mental status - depressed mood Mouth - mucous membranes moist, pharynx normal without lesions Chest - clear to auscultation, no wheezes, rales or rhonchi, symmetric air entry Heart - normal rate, regular rhythm, normal S1, S2, no murmurs, rubs, clicks or gallops Musculoskeletal - right hand numbness with extreme flexion and extension Extremities - peripheral pulses normal, no pedal edema, no clubbing or cyanosis Assessment/ Plan: 1. Right carpal tunnel syndrome 
-refer for eval and treatment 
- REFERRAL TO ORTHOPEDIC SURGERY 2. Dysthymia 
-Start on low-dose Paxil, changed from Zoloft due to side effects 
-Patient education: Side effects are common when starting SSRI therapy. Common side effects include headache, nausea, and diarrhea but these symptoms usually resolve after 2-3 weeks of therapy. Taking your SSRI with medication can help improve these side effects. There is also potential to experience a withdrawal syndrome with rapidly discontinuing SSRIs after 5 weeks of use. SSRI-withdrawal syndrome due to decreased amount of serotonin can result in dizziness, anxiety, nausea, sleep disturbance, and insomnia. - PARoxetine (PAXIL) 10 mg tablet; Take 1 Tab by mouth daily. Dispense: 30 Tab; Refill: 1 Follow-up as needed or in 1 month I have discussed the diagnosis with the patient and the intended plan as seen in the above orders. The patient has received an after-visit summary and questions were answered concerning future plans. Medication Side Effects and Warnings were discussed with patient: yes Patient Labs were reviewed: yes Patient Past Records were reviewed:  yes Geraldine Perez M.D. 
 
 
 [place]ac Pregnancy And Lactation Text: This medication is Pregnancy Category X and contraindicated in pregnancy and in women who may become pregnant. It is unknown if this medication is excreted in breast milk.

## 2022-04-13 ENCOUNTER — HOSPITAL ENCOUNTER (OUTPATIENT)
Dept: INFUSION THERAPY | Age: 47
Discharge: HOME | End: 2022-04-13

## 2022-04-15 ENCOUNTER — APPOINTMENT (OUTPATIENT)
Dept: INFUSION THERAPY | Age: 47
End: 2022-04-15

## 2022-04-26 ENCOUNTER — OFFICE VISIT (OUTPATIENT)
Dept: FAMILY MEDICINE CLINIC | Age: 47
End: 2022-04-26
Payer: MEDICAID

## 2022-04-26 VITALS
OXYGEN SATURATION: 99 % | HEART RATE: 79 BPM | BODY MASS INDEX: 22.3 KG/M2 | HEIGHT: 64 IN | DIASTOLIC BLOOD PRESSURE: 74 MMHG | TEMPERATURE: 98.6 F | RESPIRATION RATE: 16 BRPM | WEIGHT: 130.6 LBS | SYSTOLIC BLOOD PRESSURE: 109 MMHG

## 2022-04-26 DIAGNOSIS — R20.2 NUMBNESS AND TINGLING: Primary | ICD-10-CM

## 2022-04-26 DIAGNOSIS — R20.0 NUMBNESS AND TINGLING: Primary | ICD-10-CM

## 2022-04-26 PROCEDURE — 99213 OFFICE O/P EST LOW 20 MIN: CPT | Performed by: FAMILY MEDICINE

## 2022-04-26 RX ORDER — BISMUTH SUBSALICYLATE 262 MG
2 TABLET,CHEWABLE ORAL DAILY
COMMUNITY

## 2022-04-26 RX ORDER — TRANEXAMIC ACID 650 1/1
TABLET ORAL
COMMUNITY
Start: 2022-03-15 | End: 2022-06-27

## 2022-04-26 NOTE — PROGRESS NOTES
1. \"Have you been to the ER, urgent care clinic since your last visit? Hospitalized since your last visit? \" Yes, Urgent Care for numbness last Thursday. 2. \"Have you seen or consulted any other health care providers outside of the [address] [place] [address] since your last visit? \" Yes, GYN and GI    3. For patients aged 39-70: Has the patient had a colonoscopy / FIT/ Cologuard? No, but scheduled for May 20, 2022      If the patient is female:    4. For patients aged 41-77: Has the patient had a mammogram within the past 2 years? Yes, [facility], Dr. Freddie Peña      5. For patients aged 21-65: Has the patient had a pap smear?  Yes    Health Maintenance Due   Topic Date Due    Hepatitis C Screening  Never done    COVID-19 Vaccine (1) Never done    DTaP/Tdap/Td series (1 - Tdap) Never done    Cervical cancer screen  Never done    Lipid Screen  Never done    Colorectal Cancer Screening Combo  Never done

## 2022-04-26 NOTE — PROGRESS NOTES
Chief Complaint   Patient presents with    Numbness     Numbness in hands and feet for over the last week      -scheduled for endoscopy/colonoscopy 5/20  -hysterectomy on 5/26    -pt taking vitamins, drink ensure daily  -some low weight lifting.   -has regulated bowel habits    -numbness is barely there in the feet, right hand cramps  -left 5th finger numbness    -pt has had long standing numbness in her right hand, recently started in her left, became a concern when also noted in her feet, but that has improved. Subjective: (As above and below)     Chief Complaint   Patient presents with    Numbness     Numbness in hands and feet for over the last week      she is a 55y.o. year old female who presents for evaluation. Reviewed PmHx, RxHx, FmHx, SocHx, AllgHx and updated in chart. Review of Systems - negative except as listed above    Objective:     Vitals:    04/26/22 1416   BP: 109/74   Pulse: 79   Resp: 16   Temp: 98.6 °F (37 °C)   TempSrc: Oral   SpO2: 99%   Weight: 130 lb 9.6 oz (59.2 kg)   Height: 5' 4\" (1.626 m)     Physical Examination: General appearance - alert, well appearing, and in no distress  Mental status - normal mood, behavior, speech, dress, motor activity, and thought processes  Ears - bilateral TM's and external ear canals normal  Chest - clear to auscultation, no wheezes, rales or rhonchi, symmetric air entry  Heart - normal rate, regular rhythm, normal S1, S2, no murmurs, rubs, clicks or gallops  Musculoskeletal - no joint tenderness, deformity or swelling  Extremities - peripheral pulses normal, no pedal edema, no clubbing or cyanosis    Assessment/ Plan:   1.  Numbness and tingling  -refer to ortho for evaluation of hand numbness and long standing carpal tunnel symptoms  -check b12  -numbness seems to be improving, was likely transient inflammation, will monitor, consider EMG if symptoms worsen  - REFERRAL TO ORTHOPEDICS  - VITAMIN B12       I have discussed the diagnosis with the patient and the intended plan as seen in the above orders. The patient has received an after-visit summary and questions were answered concerning future plans.      Medication Side Effects and Warnings were discussed with patient: yes  Patient Labs were reviewed: yes  Patient Past Records were reviewed:  yes    Monalisa Jiménez M.D.

## 2022-04-28 LAB — VIT B12 SERPL-MCNC: 394 PG/ML (ref 232–1245)

## 2022-05-13 RX ORDER — SODIUM CHLORIDE 9 MG/ML
25 INJECTION, SOLUTION INTRAVENOUS CONTINUOUS
Status: DISPENSED | OUTPATIENT
Start: 2022-05-16 | End: 2022-05-16

## 2022-05-13 RX ORDER — ACETAMINOPHEN 500 MG
250 TABLET ORAL ONCE
Status: ACTIVE | OUTPATIENT
Start: 2022-05-16 | End: 2022-05-16

## 2022-05-16 ENCOUNTER — HOSPITAL ENCOUNTER (OUTPATIENT)
Dept: INFUSION THERAPY | Age: 47
Discharge: HOME | End: 2022-05-16

## 2022-05-16 RX ORDER — SODIUM CHLORIDE 9 MG/ML
25 INJECTION, SOLUTION INTRAVENOUS ONCE
Status: DISPENSED | OUTPATIENT
Start: 2022-05-19 | End: 2022-05-19

## 2022-05-19 ENCOUNTER — HOSPITAL ENCOUNTER (OUTPATIENT)
Dept: INFUSION THERAPY | Age: 47
Discharge: HOME | End: 2022-05-19

## 2022-05-20 ENCOUNTER — ANESTHESIA EVENT (OUTPATIENT)
Dept: ENDOSCOPY | Age: 47
End: 2022-05-20
Payer: MEDICAID

## 2022-05-20 ENCOUNTER — ANESTHESIA (OUTPATIENT)
Dept: ENDOSCOPY | Age: 47
End: 2022-05-20
Payer: MEDICAID

## 2022-05-20 ENCOUNTER — HOSPITAL ENCOUNTER (OUTPATIENT)
Age: 47
Setting detail: OUTPATIENT SURGERY
Discharge: HOME | End: 2022-05-20
Attending: INTERNAL MEDICINE | Admitting: INTERNAL MEDICINE
Payer: MEDICAID

## 2022-05-20 VITALS
OXYGEN SATURATION: 100 % | BODY MASS INDEX: 21.83 KG/M2 | TEMPERATURE: 96.9 F | SYSTOLIC BLOOD PRESSURE: 120 MMHG | RESPIRATION RATE: 17 BRPM | WEIGHT: 131 LBS | HEART RATE: 84 BPM | DIASTOLIC BLOOD PRESSURE: 79 MMHG | HEIGHT: 65 IN

## 2022-05-20 LAB
H PYLORI FROM TISSUE: NEGATIVE
HCG UR QL: NEGATIVE
KIT LOT NO., HCLOLOT: NORMAL
NEGATIVE CONTROL: NEGATIVE
POSITIVE CONTROL: POSITIVE

## 2022-05-20 PROCEDURE — 77030040684 HC NDL ENDOSC NEEDLEMASTR OCOA -B: Performed by: INTERNAL MEDICINE

## 2022-05-20 PROCEDURE — 2709999900 HC NON-CHARGEABLE SUPPLY: Performed by: INTERNAL MEDICINE

## 2022-05-20 PROCEDURE — 74011250636 HC RX REV CODE- 250/636: Performed by: NURSE ANESTHETIST, CERTIFIED REGISTERED

## 2022-05-20 PROCEDURE — 77030021593 HC FCPS BIOP ENDOSC BSC -A: Performed by: INTERNAL MEDICINE

## 2022-05-20 PROCEDURE — 74011000250 HC RX REV CODE- 250: Performed by: NURSE ANESTHETIST, CERTIFIED REGISTERED

## 2022-05-20 PROCEDURE — 87077 CULTURE AEROBIC IDENTIFY: CPT | Performed by: INTERNAL MEDICINE

## 2022-05-20 PROCEDURE — 81025 URINE PREGNANCY TEST: CPT

## 2022-05-20 PROCEDURE — 76040000007: Performed by: INTERNAL MEDICINE

## 2022-05-20 PROCEDURE — 88305 TISSUE EXAM BY PATHOLOGIST: CPT

## 2022-05-20 PROCEDURE — 76060000032 HC ANESTHESIA 0.5 TO 1 HR: Performed by: INTERNAL MEDICINE

## 2022-05-20 PROCEDURE — 77030038604 HC SNR ENDO EXACTO USEN -B: Performed by: INTERNAL MEDICINE

## 2022-05-20 RX ORDER — DEXTROMETHORPHAN/PSEUDOEPHED 2.5-7.5/.8
1.2 DROPS ORAL
Status: DISCONTINUED | OUTPATIENT
Start: 2022-05-20 | End: 2022-05-20 | Stop reason: HOSPADM

## 2022-05-20 RX ORDER — SODIUM CHLORIDE 0.9 % (FLUSH) 0.9 %
5-40 SYRINGE (ML) INJECTION AS NEEDED
Status: DISCONTINUED | OUTPATIENT
Start: 2022-05-20 | End: 2022-05-20 | Stop reason: HOSPADM

## 2022-05-20 RX ORDER — MIDAZOLAM HYDROCHLORIDE 1 MG/ML
.25-5 INJECTION, SOLUTION INTRAMUSCULAR; INTRAVENOUS
Status: DISCONTINUED | OUTPATIENT
Start: 2022-05-20 | End: 2022-05-20 | Stop reason: HOSPADM

## 2022-05-20 RX ORDER — ATROPINE SULFATE 0.1 MG/ML
0.5 INJECTION INTRAVENOUS
Status: DISCONTINUED | OUTPATIENT
Start: 2022-05-20 | End: 2022-05-20 | Stop reason: HOSPADM

## 2022-05-20 RX ORDER — SODIUM CHLORIDE 0.9 % (FLUSH) 0.9 %
5-40 SYRINGE (ML) INJECTION EVERY 8 HOURS
Status: DISCONTINUED | OUTPATIENT
Start: 2022-05-20 | End: 2022-05-20 | Stop reason: HOSPADM

## 2022-05-20 RX ORDER — EPINEPHRINE 0.1 MG/ML
1 INJECTION INTRACARDIAC; INTRAVENOUS
Status: DISCONTINUED | OUTPATIENT
Start: 2022-05-20 | End: 2022-05-20 | Stop reason: HOSPADM

## 2022-05-20 RX ORDER — LIDOCAINE HYDROCHLORIDE 20 MG/ML
INJECTION, SOLUTION EPIDURAL; INFILTRATION; INTRACAUDAL; PERINEURAL AS NEEDED
Status: DISCONTINUED | OUTPATIENT
Start: 2022-05-20 | End: 2022-05-20 | Stop reason: HOSPADM

## 2022-05-20 RX ORDER — SODIUM CHLORIDE 9 MG/ML
150 INJECTION, SOLUTION INTRAVENOUS CONTINUOUS
Status: DISCONTINUED | OUTPATIENT
Start: 2022-05-20 | End: 2022-05-20 | Stop reason: HOSPADM

## 2022-05-20 RX ORDER — SODIUM CHLORIDE 9 MG/ML
INJECTION, SOLUTION INTRAVENOUS
Status: DISCONTINUED | OUTPATIENT
Start: 2022-05-20 | End: 2022-05-20 | Stop reason: HOSPADM

## 2022-05-20 RX ORDER — PROPOFOL 10 MG/ML
INJECTION, EMULSION INTRAVENOUS AS NEEDED
Status: DISCONTINUED | OUTPATIENT
Start: 2022-05-20 | End: 2022-05-20 | Stop reason: HOSPADM

## 2022-05-20 RX ORDER — FENTANYL CITRATE 50 UG/ML
200 INJECTION, SOLUTION INTRAMUSCULAR; INTRAVENOUS
Status: DISCONTINUED | OUTPATIENT
Start: 2022-05-20 | End: 2022-05-20 | Stop reason: HOSPADM

## 2022-05-20 RX ADMIN — PROPOFOL 100 MG: 10 INJECTION, EMULSION INTRAVENOUS at 10:12

## 2022-05-20 RX ADMIN — PROPOFOL 70 MG: 10 INJECTION, EMULSION INTRAVENOUS at 10:16

## 2022-05-20 RX ADMIN — PROPOFOL 20 MG: 10 INJECTION, EMULSION INTRAVENOUS at 10:47

## 2022-05-20 RX ADMIN — PROPOFOL 40 MG: 10 INJECTION, EMULSION INTRAVENOUS at 10:40

## 2022-05-20 RX ADMIN — PROPOFOL 40 MG: 10 INJECTION, EMULSION INTRAVENOUS at 10:29

## 2022-05-20 RX ADMIN — PROPOFOL 50 MG: 10 INJECTION, EMULSION INTRAVENOUS at 10:25

## 2022-05-20 RX ADMIN — LIDOCAINE HYDROCHLORIDE 20 MG: 20 INJECTION, SOLUTION EPIDURAL; INFILTRATION; INTRACAUDAL; PERINEURAL at 10:11

## 2022-05-20 RX ADMIN — PROPOFOL 150 MG: 10 INJECTION, EMULSION INTRAVENOUS at 10:11

## 2022-05-20 RX ADMIN — PROPOFOL 40 MG: 10 INJECTION, EMULSION INTRAVENOUS at 10:34

## 2022-05-20 RX ADMIN — PROPOFOL 30 MG: 10 INJECTION, EMULSION INTRAVENOUS at 10:19

## 2022-05-20 RX ADMIN — PROPOFOL 50 MG: 10 INJECTION, EMULSION INTRAVENOUS at 10:23

## 2022-05-20 RX ADMIN — SODIUM CHLORIDE: 900 INJECTION, SOLUTION INTRAVENOUS at 10:11

## 2022-05-20 NOTE — PROCEDURES
[facility] [address] E[address] M.D.  [address], [facility]  [phone]               Colonoscopy Procedure Note    NAME: Rody Crowley  :  1975  MRN:  [id number]    Indications:   Screening colonoscopy     : Napoleon Alonzo MD    Referring Provider:  Rosario Butts MD    Staff: Endoscopy Collin Carls: Irma Holcomb  Endoscopy RN-1: Sindhu Allen RN    Prosthetic devices, grafts, tissues, transplant, or devices implanted: none    Medicines:  MAC anesthesia      Procedure Details:  After informed consent was obtained with all risks and benefits of the procedure explained and preprocedure exam completed, the patient was placed in the left lateral decubitus position. Universal protocol for patient identification was performed and documented in the nursing notes. Throughout the procedure, the patient's blood pressure was monitored at least every five minutes; pulse, and oxygen saturations were monitored continuously. All vital signs were documented in the nursing notes. A digital rectal exam was performed and was normal.  The Olympus videocolonoscope  was inserted in the rectum and carefully advanced to the terminal ileum. The colonoscope was slowly withdrawn with careful evaluation between folds. Retroflexion in the rectum was performed; findings and interventions are described below. Procedure start time, extent reached time/cecum time, and procedure end time are documented in the nursing notes. The quality of preparation was adequate. Findings:   1.  Normal TI  2. 3 sessile polyps with greatest diameter of 5 mm (2 in the cecum, 1 in the transverse colon) s/p cold snare polypectomy  3. 6 cm lateral spreading granular lesion in the rectosigmoid area-50% of the circumference without obstruction-distal end of the lesion is ~ 9 cm from the anus s/p biopsies s/p asia ink tattoo just distal to the lesion    Interventions:    biopsy of colon rectsigmoid junction, colon injection, colon biopsy    Specimens:   ID Type Source Tests Collected by Time Destination   1 : Duodenum Biopsies Preservative Duodenum  Bonilla Lopez MD 5/20/2022 1015 Pathology   2 : GE Junction Biopsies Preservative GE Junction  Bonilla Lopez MD 5/20/2022 1020 Pathology   3 : Cecal Polyps Preservative Cecum  Bonilla Lopez MD 5/20/2022 1032 Pathology   4 : Transverse Colon Polyp Preservative Colon, Transverse  Bonilla Lopez MD 5/20/2022 1035 Pathology   5 : Recto-Sigmoid Mass Biopsy Preservative Colon, Recto-sigmoid  Bonilla Lopez MD 5/20/2022 1046 Pathology       EBL:  None. Complications:   No immediate complications     Impression:  -See post-procedure diagnoses. Recommendations:   -Await pathology. Recommendation for next colonscopy in 1 year  If < 10 years, reason:  above average risk patient    Will refer to Dr. Lucian Lawson for surgical resection      Signed by:  Dennis Preciado MD          5/20/2022  10:58 AM

## 2022-05-20 NOTE — ANESTHESIA PREPROCEDURE EVALUATION
Relevant Problems   NEUROLOGY   (+) Depression      HEMATOLOGY   (+) Anemia       Anesthetic History   No history of anesthetic complications            Review of Systems / Medical History  Patient summary reviewed, nursing notes reviewed and pertinent labs reviewed    Pulmonary  Within defined limits                 Neuro/Psych         Psychiatric history     Cardiovascular  Within defined limits                Exercise tolerance: >4 METS     GI/Hepatic/Renal  Within defined limits              Endo/Other        Anemia     Other Findings              Physical Exam    Airway  Mallampati: II  TM Distance: > 6 cm  Neck ROM: normal range of motion   Mouth opening: Normal     Cardiovascular  Regular rate and rhythm,  S1 and S2 normal,  no murmur, click, rub, or gallop             Dental  No notable dental hx       Pulmonary  Breath sounds clear to auscultation               Abdominal  GI exam deferred       Other Findings            Anesthetic Plan    ASA: 2  Anesthesia type: MAC          Induction: Intravenous  Anesthetic plan and risks discussed with: Patient

## 2022-05-20 NOTE — ANESTHESIA POSTPROCEDURE EVALUATION
Post-Anesthesia Evaluation and Assessment    Patient: Trevor Iglesias MRN: [id number]  SSN: [id number]    YOB: 1975  Age: 55 y.o. Sex: female      I have evaluated the patient and they are stable and ready for discharge from the PACU. Cardiovascular Function/Vital Signs  Visit Vitals  /79   Pulse 84   Temp 36.1 °C (96.9 °F)   Resp 17   Ht 5' 4.5\" (1.638 m)   Wt 59.4 kg (131 lb)   SpO2 100%   Breastfeeding No   BMI 22.14 kg/m²       Patient is status post MAC anesthesia for Procedure(s):  COLONOSCOPY AND EGD  ESOPHAGOGASTRODUODENOSCOPY (EGD)  ESOPHAGOGASTRODUODENAL (EGD) BIOPSY  ENDOSCOPIC POLYPECTOMY  COLON BIOPSY  INJECTION. Nausea/Vomiting: None    Postoperative hydration reviewed and adequate. Pain:  Pain Scale 1: Numeric (0 - 10) (05/20/22 1120)  Pain Intensity 1: 0 (05/20/22 1120)   Managed    Neurological Status: At baseline    Mental Status, Level of Consciousness: Alert and  oriented to person, place, and time    Pulmonary Status:   O2 Device: None (Room air) (05/20/22 1120)   Adequate oxygenation and airway patent    Complications related to anesthesia: None    Post-anesthesia assessment completed. No concerns    Signed By: Pan Bee MD     May 20, 2022              Procedure(s):  COLONOSCOPY AND EGD  ESOPHAGOGASTRODUODENOSCOPY (EGD)  ESOPHAGOGASTRODUODENAL (EGD) BIOPSY  ENDOSCOPIC POLYPECTOMY  COLON BIOPSY  INJECTION. MAC    <BSHSIANPOST>    INITIAL Post-op Vital signs:   Vitals Value Taken Time   /79 05/20/22 1120   Temp 36.1 °C (96.9 °F) 05/20/22 1057   Pulse 87 05/20/22 1128   Resp 23 05/20/22 1128   SpO2 100 % 05/20/22 1128   Vitals shown include unvalidated device data.

## 2022-05-20 NOTE — H&P
[address]  [address] [facility], [address] [place] [address]          Pre-procedure History and Physical       NAME:  Patricia Valle   :   1975   MRN:   [id number]     CHIEF COMPLAINT/HPI: iron def anemia    PMH:  Past Medical History:   Diagnosis Date    Anemia     Depression     self treats with essential oils    Ill-defined condition     hx of 2 transfusions - no reaction    Ill-defined condition     will be getting iron infusions next week - 2022    Other ill-defined conditions(799.89)     chronic hives       PSH:  Past Surgical History:   Procedure Laterality Date    HX GYN      cerclage x2    HX HEENT  2018    4 teeth removed        Allergies:  No Known Allergies    Home Medications:  Prior to Admission Medications   Prescriptions Last Dose Informant Patient Reported? Taking? OTHER 2022  Yes Yes   Sig: Takes 45 mg iron po once daily. cetirizine (ZYRTEC) 10 mg tablet Unknown at Unknown time  Yes No   Sig: Take 10 mg by mouth daily as needed. hydrOXYzine pamoate (VISTARIL) 25 mg capsule Unknown at Unknown time  No No   Sig: Take 1 Capsule by mouth two (2) times daily as needed for Anxiety. Patient not taking: Reported on 2022   lactose-reduced food (ENSURE PO) 2022  Yes Yes   Sig: Take  by mouth. Drinks one daily. multivitamin (ONE A DAY) tablet 2022  Yes Yes   Sig: Take 2 Tablets by mouth daily. tranexamic acid (LYSTEDA) 650 mg tab tablet 5/3/2022  Yes Yes   Sig: Take  by mouth.  Takes 1300 mg po 3 times daily during menses      Facility-Administered Medications: None       Hospital Medications:  Current Facility-Administered Medications   Medication Dose Route Frequency    0.9% sodium chloride infusion  150 mL/hr IntraVENous CONTINUOUS    sodium chloride (NS) flush 5-40 mL  5-40 mL IntraVENous Q8H    sodium chloride (NS) flush 5-40 mL  5-40 mL IntraVENous PRN    midazolam (VERSED) injection 0.25-5 mg  0.25-5 mg IntraVENous Multiple    fentaNYL citrate (PF) injection 200 mcg  200 mcg IntraVENous Multiple    simethicone (MYLICON) 80KI/2.2DT oral drops 80 mg  1.2 mL Oral Multiple    atropine injection 0.5 mg  0.5 mg IntraVENous ONCE PRN    EPINEPHrine (ADRENALIN) 0.1 mg/mL syringe 1 mg  1 mg Endoscopically ONCE PRN       Family History:  Family History   Problem Relation Age of Onset    Cancer Mother         uterine    Diabetes Mother     Alzheimer's Disease Father     Dementia Father     Hypertension Sister     Hypertension Brother     Diabetes Brother     Breast Cancer Maternal Aunt     Breast Cancer Maternal Uncle     Hypertension Sister     Hypertension Brother        Social History:  Social History     Tobacco Use    Smoking status: Never Smoker    Smokeless tobacco: Never Used   Substance Use Topics    Alcohol use: No     Comment: rarely       The patient was counseled at length about the risks of tasha Covid-19 in the nettie-operative and post-operative states including the recovery window of their procedure. The patient was made aware that tasha Covid-19 after a surgical procedure may worsen their prognosis for recovering from the virus and lend to a higher morbidity and or mortality risk. The patient was given the options of postponing their procedure. All of the risks, benefits, and alternatives were discussed. The patient does  wish to proceed with the procedure. PHYSICAL EXAM PRIOR TO SEDATION:  General: Alert, in no acute distress    Lungs:            CTA bilaterally  Heart:  Normal S1, S2    Abdomen: Soft, Non distended, Non tender. Normoactive bowel sounds. Assessment:   Stable for sedation administration.   Date of last colonoscopy: none, Polyps  No    Plan:     · Endoscopic procedure with sedation     Signed By: Mahogany Muñoz MD     5/20/2022  10:12 AM

## 2022-05-20 NOTE — PROCEDURES
[facility] [address] M.D.  174 [facility], [address] [place] [address]  [phone]               Esophagogastroduodenoscopy (EGD) Procedure Note    NAME: Sharon Pool  :  1975  MRN:  [id number]    Indications:  Iron deficiency anemia     : Wayne Duran MD    Referring Provider:  Radha Butts MD    Staff: Endoscopy Annetta Cespedes: Eugenie Velasquez  Endoscopy RN-1: Nikia Dacosta RN    Prosthetic devices, grafts, tissues, transplant, or devices implanted: none    Medicine:  MAC anesthesia      Procedure Details:  After informed consent was obtained with all risks and benefits of the procedure explained and preprocedure exam completed, the patient was placed in the left lateral decubitus position. Universal protocol for patient identification was performed and documented in the nursing notes. Throughout the procedure, the patient's blood pressure was monitored at least every five minutes; pulse, and oxygen saturations were monitored continuously. All vital signs were documented in the nursing notes. The endoscope was inserted into the mouth and advanced under direct vision to second portion of the duodenum. A careful inspection was made as the gastroscope was withdrawn, including a retroflexed view of the proximal stomach; findings and interventions are described below.       Findings:   Esophagus: mild localized polypoid appearance at the GE junction s/p biopsies  Stomach: 2 cm hiatal hernia, rest of the stomach was normal s/p CLOtest biopsies  Duodenum: normal s/p biopsies for celiac disease    Interventions:    biopsy of esophagus, stomach, and duodenum    Specimens:   ID Type Source Tests Collected by Time Destination   1 : Duodenum Biopsies Preservative Duodenum  Ihsan Nicole MD 2022 1015 Pathology   2 : GE Junction Biopsies Preservative GE Junction  Ihsan Nicole MD 2022 1020 Pathology   3 : Cecal Polyps Preservative Cecum Jessica Jerry MD 5/20/2022 1032 Pathology   4 : Transverse Colon Polyp Preservative Colon, Transverse  Jessica Jerry MD 5/20/2022 1035 Pathology   5 : Recto-Sigmoid Mass Biopsy Preservative Colon, Recto-sigmoid  Jessica Jerry MD 5/20/2022 1046 Pathology        EBL: None          Complications:     No immediate complications        Impression:  -See post-procedure diagnoses. Recommendations:  -Await pathology. Signed by:  Ken Gallagher MD         5/20/2022 10:54 AM

## 2022-05-20 NOTE — PROGRESS NOTES

## 2022-05-20 NOTE — DISCHARGE INSTRUCTIONS
Keerthi Garcia [facility] 912 Giacomo Gibbs M.D.  174 [facility], [address] [place] [address]  [phone]                      EGD/COLONOSCOPY DISCHARGE INSTRUCTIONS    Steve Fung  [id number]  1975    DISCOMFORT:  Redness at IV site- apply warm compress to area; if redness or soreness persist- contact your physician  There may be a slight amount of blood passed from the rectum  Gaseous discomfort- walking, belching will help relieve any discomfort  You may not operate a vehicle for 12 hours  You may not  engage in an occupation involving machinery or appliances for rest of today  You may not  drink alcoholic beverages for at least 12 hours  Avoid making any critical decisions for at least 24 hour    DIET:   You may resume your normal diet, but some patients find that heavy or large  meals may lead to indigestion or vomiting. I suggest a light meal as first food  Intake. I recommend a whole food, plant-based diet for your overall health. ACTIVITY:  You may resume your normal daily activities. It is recommended that you spend the remainder of the day resting - avoid any strenuous activity. CALL M.D. ANY SIGN OF   Increasing pain, nausea, vomiting  Abdominal distension (swelling)  New increased bleeding (oral or rectal)  Fever (chills)  Pain in chest area  Bloody discharge from nose or mouth  Shortness of breath    Additional Instructions:   Call Dr. Giacomo Gibbs if any questions or problems at [phone]   You should receive the biopsy results by phone or mail within 3 weeks, if not, call my office for the results. Should have a repeat colonoscopy in 1 year. EGD showed mild polypoid change at the GEJ, small hiatal hernia. Colonoscopy showed 3 polyps removed, lateral spreading lesion in the rectosigmoid area-biopsies taken. Will refer to Dr. Nayeli Benítez for removal.          Learning About Coronavirus ([id number])  Coronavirus ([id number]): Overview  What is coronavirus (REOLE-75)?   The coronavirus disease (COVID-19) is caused by a virus. It is an illness that was [facility] [place]. It has since spread worldwide. The virus can cause fever, cough, and trouble breathing. In severe cases, it can cause pneumonia and make it hard to breathe without help. It can cause death. Coronaviruses are a large group of viruses. They cause the common cold. They also cause more serious illnesses like Middle East respiratory syndrome (MERS) and severe acute respiratory syndrome (SARS). COVID-19 is caused by a novel coronavirus. That means it's a new type that has not been seen in people before. This virus spreads person-to-person through droplets from coughing and sneezing. It can also spread when you are close to someone who is infected. And it can spread when you touch something that has the virus on it, such as a doorknob or a tabletop. What can you do to protect yourself from coronavirus (COVID-19)? The best way to protect yourself from getting sick is to:  · Avoid areas where there is an outbreak. · Avoid contact with people who may be infected. · Wash your hands often with soap or alcohol-based hand sanitizers. · Avoid crowds and try to stay at least 6 feet away from other people. · Wash your hands often, especially after you cough or sneeze. Use soap and water, and scrub for at least 20 seconds. If soap and water aren't available, use an alcohol-based hand . · Avoid touching your mouth, nose, and eyes. What can you do to avoid spreading the virus to others? To help avoid spreading the virus to others:  · Cover your mouth with a tissue when you cough or sneeze. Then throw the tissue in the trash. · Use a disinfectant to clean things that you touch often. · Stay home if you are sick or have been exposed to the virus. Don't go to school, work, or public areas. And don't use public transportation.   · If you are sick:  ? Leave your home only if you need to get medical care. But call the doctor's office first so they know you're coming. And wear a face mask, if you have one.  ? If you have a face mask, wear it whenever you're around other people. It can help stop the spread of the virus when you cough or sneeze. ? Clean and disinfect your home every day. Use household  and disinfectant wipes or sprays. Take special care to clean things that you grab with your hands. These include doorknobs, remote controls, phones, and handles on your refrigerator and microwave. And don't forget countertops, tabletops, bathrooms, and computer keyboards. When to call for help  Call 911 anytime you think you may need emergency care. For example, call if:  · You have severe trouble breathing. (You can't talk at all.)  · You have constant chest pain or pressure. · You are severely dizzy or lightheaded. · You are confused or can't think clearly. · Your face and lips have a blue color. · You pass out (lose consciousness) or are very hard to wake up. Call your doctor now if you develop symptoms such as:  · Shortness of breath. · Fever. · Cough. If you need to get care, call ahead to the doctor's office for instructions before you go. Make sure you wear a face mask, if you have one, to prevent exposing other people to the virus. Where can you get the latest information? The following health organizations are tracking and studying this virus. Their websites contain the most up-to-date information. Brianna Aaroner also learn what to do if you think you may have been exposed to the virus. · U.S. Centers for Disease Control and Prevention (CDC): The CDC provides updated news about the disease and travel advice. The website also tells you how to prevent the spread of infection. www.cdc.gov  · World Health Organization [facility]): WHO offers information about the virus outbreaks.  WHO also has travel advice. www.who.int  Current as of: April 1, 2020               Content Version: 12.4  © 0662-2973 [employer], [employer]orporated. Care instructions adapted under license by your healthcare professional. If you have questions about a medical condition or this instruction, always ask your healthcare professional. [address] any warranty or liability for your use of this information.

## 2022-05-23 ENCOUNTER — APPOINTMENT (OUTPATIENT)
Dept: INFUSION THERAPY | Age: 47
End: 2022-05-23

## 2022-06-03 ENCOUNTER — TRANSCRIBE ORDER (OUTPATIENT)
Dept: SCHEDULING | Age: 47
End: 2022-06-03

## 2022-06-03 DIAGNOSIS — D49.0 RECTAL NEOPLASM: Primary | ICD-10-CM

## 2022-06-06 ENCOUNTER — TRANSCRIBE ORDER (OUTPATIENT)
Dept: SCHEDULING | Age: 47
End: 2022-06-06

## 2022-06-06 DIAGNOSIS — K62.89 RECTAL MASS: Primary | ICD-10-CM

## 2022-06-09 ENCOUNTER — HOSPITAL ENCOUNTER (OUTPATIENT)
Dept: CT IMAGING | Age: 47
Discharge: HOME | End: 2022-06-09
Attending: COLON & RECTAL SURGERY
Payer: MEDICAID

## 2022-06-09 DIAGNOSIS — K62.89 RECTAL MASS: ICD-10-CM

## 2022-06-09 PROCEDURE — 74177 CT ABD & PELVIS W/CONTRAST: CPT

## 2022-06-09 PROCEDURE — 74011000636 HC RX REV CODE- 636: Performed by: RADIOLOGY

## 2022-06-09 RX ADMIN — IOPAMIDOL 100 ML: 755 INJECTION, SOLUTION INTRAVENOUS at 15:24

## 2022-06-17 ENCOUNTER — HOSPITAL ENCOUNTER (OUTPATIENT)
Dept: PREADMISSION TESTING | Age: 47
Discharge: HOME | End: 2022-06-17
Payer: MEDICAID

## 2022-06-17 VITALS
HEIGHT: 65 IN | HEART RATE: 91 BPM | DIASTOLIC BLOOD PRESSURE: 73 MMHG | WEIGHT: 132.28 LBS | TEMPERATURE: 98.1 F | SYSTOLIC BLOOD PRESSURE: 107 MMHG | RESPIRATION RATE: 16 BRPM | OXYGEN SATURATION: 98 % | BODY MASS INDEX: 22.04 KG/M2

## 2022-06-17 LAB
ALBUMIN SERPL-MCNC: 3.8 G/DL (ref 3.5–5)
ALBUMIN/GLOB SERPL: 1.1 {RATIO} (ref 1.1–2.2)
ALP SERPL-CCNC: 60 U/L (ref 45–117)
ALT SERPL-CCNC: 14 U/L (ref 12–78)
ANION GAP SERPL CALC-SCNC: 4 MMOL/L (ref 5–15)
AST SERPL-CCNC: 6 U/L (ref 15–37)
ATRIAL RATE: 74 BPM
BILIRUB SERPL-MCNC: 1 MG/DL (ref 0.2–1)
BUN SERPL-MCNC: 12 MG/DL (ref 6–20)
BUN/CREAT SERPL: 16 (ref 12–20)
CALCIUM SERPL-MCNC: 9.7 MG/DL (ref 8.5–10.1)
CALCULATED P AXIS, ECG09: 38 DEGREES
CALCULATED R AXIS, ECG10: 50 DEGREES
CALCULATED T AXIS, ECG11: 38 DEGREES
CANCER AG125 SERPL-ACNC: 15 U/ML (ref 1.5–35)
CHLORIDE SERPL-SCNC: 106 MMOL/L (ref 97–108)
CO2 SERPL-SCNC: 28 MMOL/L (ref 21–32)
CREAT SERPL-MCNC: 0.77 MG/DL (ref 0.55–1.02)
DIAGNOSIS, 93000: NORMAL
ERYTHROCYTE [DISTWIDTH] IN BLOOD BY AUTOMATED COUNT: 15.7 % (ref 11.5–14.5)
EST. AVERAGE GLUCOSE BLD GHB EST-MCNC: 100 MG/DL
GLOBULIN SER CALC-MCNC: 3.5 G/DL (ref 2–4)
GLUCOSE SERPL-MCNC: 91 MG/DL (ref 65–100)
HBA1C MFR BLD: 5.1 % (ref 4–5.6)
HCT VFR BLD AUTO: 38.7 % (ref 35–47)
HGB BLD-MCNC: 12.3 G/DL (ref 11.5–16)
MCH RBC QN AUTO: 29.4 PG (ref 26–34)
MCHC RBC AUTO-ENTMCNC: 31.8 G/DL (ref 30–36.5)
MCV RBC AUTO: 92.4 FL (ref 80–99)
NRBC # BLD: 0 K/UL (ref 0–0.01)
NRBC BLD-RTO: 0 PER 100 WBC
P-R INTERVAL, ECG05: 146 MS
PLATELET # BLD AUTO: 270 K/UL (ref 150–400)
PMV BLD AUTO: 10.1 FL (ref 8.9–12.9)
POTASSIUM SERPL-SCNC: 4.1 MMOL/L (ref 3.5–5.1)
PROT SERPL-MCNC: 7.3 G/DL (ref 6.4–8.2)
Q-T INTERVAL, ECG07: 346 MS
QRS DURATION, ECG06: 70 MS
QTC CALCULATION (BEZET), ECG08: 384 MS
RBC # BLD AUTO: 4.19 M/UL (ref 3.8–5.2)
SODIUM SERPL-SCNC: 138 MMOL/L (ref 136–145)
VENTRICULAR RATE, ECG03: 74 BPM
WBC # BLD AUTO: 2.6 K/UL (ref 3.6–11)

## 2022-06-17 PROCEDURE — 80053 COMPREHEN METABOLIC PANEL: CPT

## 2022-06-17 PROCEDURE — 85027 COMPLETE CBC AUTOMATED: CPT

## 2022-06-17 PROCEDURE — 93005 ELECTROCARDIOGRAM TRACING: CPT

## 2022-06-17 PROCEDURE — 86304 IMMUNOASSAY TUMOR CA 125: CPT

## 2022-06-17 PROCEDURE — 83036 HEMOGLOBIN GLYCOSYLATED A1C: CPT

## 2022-06-17 PROCEDURE — 86900 BLOOD TYPING SEROLOGIC ABO: CPT

## 2022-06-17 PROCEDURE — 36415 COLL VENOUS BLD VENIPUNCTURE: CPT

## 2022-06-17 NOTE — PERIOP NOTES
Spoke with Vianey, nurse to Dr. Steve Fung, re: gyn preop orders; telephone order received for ; patient has preop appointment with Dr. Steve Fung 6/20/22.

## 2022-06-20 ENCOUNTER — OFFICE VISIT (OUTPATIENT)
Dept: GYNECOLOGY | Age: 47
End: 2022-06-20

## 2022-06-20 VITALS
SYSTOLIC BLOOD PRESSURE: 103 MMHG | BODY MASS INDEX: 21.21 KG/M2 | WEIGHT: 132 LBS | HEIGHT: 66 IN | HEART RATE: 80 BPM | DIASTOLIC BLOOD PRESSURE: 71 MMHG

## 2022-06-20 DIAGNOSIS — N92.0 MENORRHAGIA WITH REGULAR CYCLE: ICD-10-CM

## 2022-06-20 DIAGNOSIS — D25.9 UTERINE LEIOMYOMA, UNSPECIFIED LOCATION: Primary | ICD-10-CM

## 2022-06-20 DIAGNOSIS — D50.0 IRON DEFICIENCY ANEMIA DUE TO CHRONIC BLOOD LOSS: ICD-10-CM

## 2022-06-20 LAB
ABO + RH BLD: NORMAL
BLOOD GROUP ANTIBODIES SERPL: NORMAL
SPECIMEN EXP DATE BLD: NORMAL

## 2022-06-20 PROCEDURE — 99204 OFFICE O/P NEW MOD 45 MIN: CPT | Performed by: OBSTETRICS & GYNECOLOGY

## 2022-06-20 NOTE — PROGRESS NOTES
27 [facility] [address], [address]  P [phone]  F [phone]    Office Note  Patient ID:  Name:  Richard Aponte  MRN:  [id number]  :  1975/46 y.o. Date:  2022      HISTORY OF PRESENT ILLNESS:  Richard Aponte is a 55 y.o.  premenopausal female who is a new patient being seen for consultation for hysterectomy. She is referred by Dr. Hillary Bass with [facility]. She has been having menorrhagia and anemia secondary to uterine fibroids for several years, even to the point of requiring transfusions. Dr. Serg Perez had discussed surgery with her and was actually planning a robotic-assisted laparoscopic hysterectomy. She recently underwent a colonoscopy which revealed a large polyp in the rectosigmoid colon. The polyp could not be removed endoscopically. She was referred to Dr. Ban Samuel for resection. He is planning on performing a hand-assisted laparoscopic colon resection with reanastomosis. Dr. Deja Bautista tried to coordinate with Dr. Serg Perez or one of her partners, but their schedules did not line up. I have been asked to see her in consultation for a laparoscopic hysterectomy. ROS:   and GI review:  Negative  Cardiopulmonary review:  Negative   Musculoskeletal:  Negative    A comprehensive review of systems was negative except for that written in the History of Present Illness. , 10 point ROS      OB/GYN ROS/HX:  [id number]  Hx of cerclage  Hx of CKC      Problem List:  Patient Active Problem List    Diagnosis Date Noted    Anemia     Depression      PMH:  Past Medical History:   Diagnosis Date    Anemia     Anxiety and depression     anxity uses meds    Depression     self treats with essential oils    Ill-defined condition     anemia    Other ill-defined conditions(799.89)     chronic hives      PSH:  Past Surgical History:   Procedure Laterality Date    COLONOSCOPY N/A 2022 COLONOSCOPY AND EGD [facility]esto [facility]    HX GYN      cerclage x2    HX HEENT  02/2018    4 teeth removed       Social History:  Social History     Tobacco Use    Smoking status: Never Smoker    Smokeless tobacco: Never Used   Substance Use Topics    Alcohol use: Yes     Comment: rarely      Family History:  Family History   Problem Relation Age of Onset   Jenna Pro Cancer Mother         uterine    Diabetes Mother     Alzheimer's Disease Father     Dementia Father     Hypertension Sister     Hypertension Brother     Diabetes Brother     Breast Cancer Maternal Aunt     Breast Cancer Maternal Uncle     Hypertension Sister     Hypertension Brother       Medications: (reviewed)  Current Outpatient Medications   Medication Sig    OTHER Takes 45 mg iron po once daily.  lactose-reduced food (ENSURE PO) Take  by mouth. Drinks one daily.  tranexamic acid (LYSTEDA) 650 mg tab tablet Take  by mouth. Takes 1300 mg po 3 times daily during menses    cetirizine (ZYRTEC) 10 mg tablet Take 10 mg by mouth daily as needed.  multivitamin (ONE A DAY) tablet Take 2 Tablets by mouth daily. (Patient not taking: Reported on 6/20/2022)    hydrOXYzine pamoate (VISTARIL) 25 mg capsule Take 1 Capsule by mouth two (2) times daily as needed for Anxiety. (Patient not taking: Reported on 5/20/2022)     No current facility-administered medications for this visit. Allergies: (reviewed)  No Known Allergies       OBJECTIVE:    Physical Exam:  VITAL SIGNS: Vitals:    06/20/22 1039   BP: 103/71   Pulse: 80   Weight: 132 lb (59.9 kg)   Height: 5' 6.26\" (1.683 m)     Body mass index is 21.14 kg/m². GENERAL YARELIS: Conversant, alert, oriented. No acute distress. HEENT: HEENT. No thyroid enlargement. No JVD. Neck: Supple without restrictions. RESPIRATORY: Clear to auscultation and percussion to the bases. No CVAT. CARDIOVASC: RRR without murmur/rub. GASTROINT: Soft, NT, ND.   Mass palpable in the suprapubic region. MUSCULOSKEL: no joint tenderness, deformity or swelling   EXTREMITIES: extremities normal, atraumatic, no cyanosis or edema   PELVIC: Normal external genitalia. Normal vagina. Normal cervix. Enlarged, multilobulated, fibroid uterus measuring about 14-15 weeks in size. The uterus was surprisingly mobile, although she was a bit tender with manipulation. The cul de sac was smooth. The adnexa were not palpable. RECTAL: Deferred   ROJAS SURVEY: No suspicious lymphadenopathy or edema noted. NEURO: Grossly intact. No acute deficit. Lab Data:    Lab Results   Component Value Date/Time    WBC 2.6 (L) 06/17/2022 10:30 AM    HGB 12.3 06/17/2022 10:30 AM    HCT 38.7 06/17/2022 10:30 AM    PLATELET 461 77/67/4619 10:30 AM    MCV 92.4 06/17/2022 10:30 AM     Lab Results   Component Value Date/Time    Sodium 138 06/17/2022 10:30 AM    Potassium 4.1 06/17/2022 10:30 AM    Chloride 106 06/17/2022 10:30 AM    CO2 28 06/17/2022 10:30 AM    Anion gap 4 (L) 06/17/2022 10:30 AM    Glucose 91 06/17/2022 10:30 AM    BUN 12 06/17/2022 10:30 AM    Creatinine 0.77 06/17/2022 10:30 AM    BUN/Creatinine ratio 16 06/17/2022 10:30 AM    GFR est AA >60 06/17/2022 10:30 AM    GFR est non-AA >60 06/17/2022 10:30 AM    Calcium 9.7 06/17/2022 10:30 AM         Pelvic ultrasound (10/7/21)  The uterus measures 121 x 90 x 83 mm. An anterior intramuscular fibroid in the  right lower uterine segment measures 45 x 57 mm. There are probably additional,  smaller, intramuscular fibroids. The endometrium measures 9 mm and is of normal  thickness without focal abnormality. The ovaries are normal in size and  echotexture. The bilateral normal Doppler flow does not exclude torsion,  however, the likelihood is very low given the greyscale appearance of the  ovaries. The right ovary is obscured by bowel gas and the left ovary measures 24  x 22 x 19 mm. There is no free pelvic fluid.     IMPRESSION  Uterine fibroid(s).       CT of abdomen/pelvis (6/9/22)  LOWER THORAX: No significant abnormality in the incidentally imaged lower chest.  LIVER: No mass. BILIARY TREE: Gallbladder is unremarkable. CBD is not dilated. SPLEEN: Unremarkable. PANCREAS: No mass or ductal dilatation. ADRENALS: Unremarkable. KIDNEYS: No mass, calculus, or hydronephrosis. STOMACH: Unremarkable. SMALL BOWEL: No dilatation or wall thickening. COLON: No rectosigmoid CT abnormality to correlate with reported colonoscopic  findings. Colon is otherwise unremarkable with no dilation or wall thickening. There are no suspicious mesial rectal lymph nodes identified. APPENDIX:  Unremarkable. PERITONEUM: No ascites or pneumoperitoneum. RETROPERITONEUM: No lymphadenopathy or aortic aneurysm. REPRODUCTIVE ORGANS: Enlarged heterogeneous leiomyomatous uterus redemonstrated. Ovaries are unremarkable. URINARY BLADDER: No mass or calculus. BONES: Degenerative spine change. No acute fracture or aggressive lesion. ABDOMINAL WALL: No mass or hernia. ADDITIONAL COMMENTS: N/A     IMPRESSION  No evidence CT abnormality at site of reported rectosigmoid lesion  with no evidence of metastatic disease within the abdomen or pelvis. IMPRESSION/PLAN:  Alex Hdez is a 55 y.o. female with a diagnosis of uterine fibroids, menorrhagia, and resulting anemia. I reviewed with Alex Hdez her medical records, physical exam, and review of symptoms. I agree that a hysterectomy is indicated. I should be able to perform the procedure without too much difficulty. Removing the specimen will be the most difficult part. Normally I would remove the uterus transvaginally. Due to the size of the uterus, this would be done with hand-morcellation transvaginally. In this situation, since Dr. Merlin Silverio will be placing a hand port, we can probably use that incision to remove the uterus intact and without the need for morcellation.   Since she is premenopausal, she would like to keep her ovaries, as long as there is no evidence of malignancy. She was counseled on the risks, benefits, indications and alternatives of the planned procedure. We discussed the possible surgical risks of bleeding, infection, potential injury to other organs/structures, and the risks of anesthesia. Her questions were answered to her satisfaction and she wishes to proceed as planned. We will coordinate with Dr. Asher Sacks to schedule her surgery. An electronic signature was used to sign this note.     Antonette Green MD  06/20/22

## 2022-06-20 NOTE — LETTER
2022 11:59 AM    Patient:  Niki Gallegos   YOB: 1975  Date of Visit: 2022      Dear Edinson Gore MD  [address] [facility] [address] [address]  Via Fax: [address], [address] [place] [address]  Via Fax: [address]  [address]  [address] [place] [phone]  Via In Basket: Thank you for referring Ms. Niki Gallegos to me for evaluation/treatment. Below are the relevant portions of my assessment and plan of care. New patient referred by Vijaya Orr for fibroids. 27 [facility], [address]  P [phone]  F [phone]    Office Note  Patient ID:  Name:  Niki Gallegos  MRN:  [id number]  :  1975/46 y.o. Date:  2022      HISTORY OF PRESENT ILLNESS:  Niki Gallegos is a 55 y.o.  premenopausal female who is a new patient being seen for consultation for hysterectomy. She is referred by Dr. Helena Matias with [facility]. She has been having menorrhagia and anemia secondary to uterine fibroids for several years, even to the point of requiring transfusions. Dr. Kendall Nolan had discussed surgery with her and was actually planning a robotic-assisted laparoscopic hysterectomy. She recently underwent a colonoscopy which revealed a large polyp in the rectosigmoid colon. The polyp could not be removed endoscopically. She was referred to Dr. Derian Ramirez for resection. He is planning on performing a hand-assisted laparoscopic colon resection with reanastomosis. Dr. Libby Acuña tried to coordinate with Dr. Kendall Nolan or one of her partners, but their schedules did not line up. I have been asked to see her in consultation for a laparoscopic hysterectomy.           ROS:   and GI review:  Negative  Cardiopulmonary review:  Negative   Musculoskeletal:  Negative    A comprehensive review of systems was negative except for that written in the History of Present Illness. , 10 point ROS      OB/GYN ROS/HX:  [id number]  Hx of cerclage  Hx of CKC      Problem List:  Patient Active Problem List    Diagnosis Date Noted    Anemia     Depression      PMH:  Past Medical History:   Diagnosis Date    Anemia     Anxiety and depression     anxity uses meds    Depression     self treats with essential oils    Ill-defined condition     anemia    Other ill-defined conditions(799.89)     chronic hives      PSH:  Past Surgical History:   Procedure Laterality Date    COLONOSCOPY N/A 5/20/2022    COLON[facility] AND EGD performed by Sh[facility]ar [facility]ui [facility] at [facility]OSCOPY    HX GYN      cerclage x2    HX HEENT  02/2018    4 teeth removed       Social History:  Social History     Tobacco Use    Smoking status: Never Smoker    Smokeless tobacco: Never Used   Substance Use Topics    Alcohol use: Yes     Comment: rarely      Family History:  Family History   Problem Relation Age of Onset   [facility] Mother         uterine    Diabetes Mother     Alzheimer's Disease Father     Dementia Father     Hypertension Sister     Hypertension Brother     Diabetes Brother     Breast Cancer Maternal Aunt     Breast Cancer Maternal Uncle     Hypertension Sister     Hypertension Brother       Medications: (reviewed)  Current Outpatient Medications   Medication Sig    OTHER Takes 45 mg iron po once daily.  lactose-reduced food (ENSURE PO) Take  by mouth. Drinks one daily.  tranexamic acid (LYSTEDA) 650 mg tab tablet Take  by mouth. Takes 1300 mg po 3 times daily during menses    cetirizine (ZYRTEC) 10 mg tablet Take 10 mg by mouth daily as needed.  multivitamin (ONE A DAY) tablet Take 2 Tablets by mouth daily. (Patient not taking: Reported on 6/20/2022)    hydrOXYzine pamoate (VISTARIL) 25 mg capsule Take 1 Capsule by mouth two (2) times daily as needed for Anxiety.  (Patient not taking: Reported on 5/20/2022) No current facility-administered medications for this visit. Allergies: (reviewed)  No Known Allergies       OBJECTIVE:    Physical Exam:  VITAL SIGNS: Vitals:    06/20/22 1039   BP: 103/71   Pulse: 80   Weight: 132 lb (59.9 kg)   Height: 5' 6.26\" (1.683 m)     Body mass index is 21.14 kg/m². GENERAL YARELIS: Conversant, alert, oriented. No acute distress. HEENT: HEENT. No thyroid enlargement. No JVD. Neck: Supple without restrictions. RESPIRATORY: Clear to auscultation and percussion to the bases. No CVAT. CARDIOVASC: RRR without murmur/rub. GASTROINT: Soft, NT, ND. Mass palpable in the suprapubic region. MUSCULOSKEL: no joint tenderness, deformity or swelling   EXTREMITIES: extremities normal, atraumatic, no cyanosis or edema   PELVIC: Normal external genitalia. Normal vagina. Normal cervix. Enlarged, multilobulated, fibroid uterus measuring about 14-15 weeks in size. The uterus was surprisingly mobile, although she was a bit tender with manipulation. The cul de sac was smooth. The adnexa were not palpable. RECTAL: Deferred   ROJAS SURVEY: No suspicious lymphadenopathy or edema noted. NEURO: Grossly intact. No acute deficit.        Lab Data:    Lab Results   Component Value Date/Time    WBC 2.6 (L) 06/17/2022 10:30 AM    HGB 12.3 06/17/2022 10:30 AM    HCT 38.7 06/17/2022 10:30 AM    PLATELET 128 62/93/3145 10:30 AM    MCV 92.4 06/17/2022 10:30 AM     Lab Results   Component Value Date/Time    Sodium 138 06/17/2022 10:30 AM    Potassium 4.1 06/17/2022 10:30 AM    Chloride 106 06/17/2022 10:30 AM    CO2 28 06/17/2022 10:30 AM    Anion gap 4 (L) 06/17/2022 10:30 AM    Glucose 91 06/17/2022 10:30 AM    BUN 12 06/17/2022 10:30 AM    Creatinine 0.77 06/17/2022 10:30 AM    BUN/Creatinine ratio 16 06/17/2022 10:30 AM    GFR est AA >60 06/17/2022 10:30 AM    GFR est non-AA >60 06/17/2022 10:30 AM    Calcium 9.7 06/17/2022 10:30 AM         Pelvic ultrasound (10/7/21)  The uterus measures 121 x 90 x 83 mm. An anterior intramuscular fibroid in the  right lower uterine segment measures 45 x 57 mm. There are probably additional,  smaller, intramuscular fibroids. The endometrium measures 9 mm and is of normal  thickness without focal abnormality. The ovaries are normal in size and  echotexture. The bilateral normal Doppler flow does not exclude torsion,  however, the likelihood is very low given the greyscale appearance of the  ovaries. The right ovary is obscured by bowel gas and the left ovary measures 24  x 22 x 19 mm. There is no free pelvic fluid.     IMPRESSION  Uterine fibroid(s). CT of abdomen/pelvis (6/9/22)  LOWER THORAX: No significant abnormality in the incidentally imaged lower chest.  LIVER: No mass. BILIARY TREE: Gallbladder is unremarkable. CBD is not dilated. SPLEEN: Unremarkable. PANCREAS: No mass or ductal dilatation. ADRENALS: Unremarkable. KIDNEYS: No mass, calculus, or hydronephrosis. STOMACH: Unremarkable. SMALL BOWEL: No dilatation or wall thickening. COLON: No rectosigmoid CT abnormality to correlate with reported colonoscopic  findings. Colon is otherwise unremarkable with no dilation or wall thickening. There are no suspicious mesial rectal lymph nodes identified. APPENDIX:  Unremarkable. PERITONEUM: No ascites or pneumoperitoneum. RETROPERITONEUM: No lymphadenopathy or aortic aneurysm. REPRODUCTIVE ORGANS: Enlarged heterogeneous leiomyomatous uterus redemonstrated. Ovaries are unremarkable. URINARY BLADDER: No mass or calculus. BONES: Degenerative spine change. No acute fracture or aggressive lesion. ABDOMINAL WALL: No mass or hernia. ADDITIONAL COMMENTS: N/A     IMPRESSION  No evidence CT abnormality at site of reported rectosigmoid lesion  with no evidence of metastatic disease within the abdomen or pelvis. IMPRESSION/PLAN:  Ritesh Julien is a 55 y.o. female with a diagnosis of uterine fibroids, menorrhagia, and resulting anemia.   I reviewed with Gisela Colonpin her medical records, physical exam, and review of symptoms. I agree that a hysterectomy is indicated. I should be able to perform the procedure without too much difficulty. Removing the specimen will be the most difficult part. Normally I would remove the uterus transvaginally. Due to the size of the uterus, this would be done with hand-morcellation transvaginally. In this situation, since Dr. Grace Muhammad will be placing a hand port, we can probably use that incision to remove the uterus intact and without the need for morcellation. Since she is premenopausal, she would like to keep her ovaries, as long as there is no evidence of malignancy. She was counseled on the risks, benefits, indications and alternatives of the planned procedure. We discussed the possible surgical risks of bleeding, infection, potential injury to other organs/structures, and the risks of anesthesia. Her questions were answered to her satisfaction and she wishes to proceed as planned. We will coordinate with Dr. Grace Muhammad to schedule her surgery. An electronic signature was used to sign this note. Kumar Ruiz MD  06/20/22             If you have questions, please do not hesitate to call me. I look forward to following Ms. Leonora Whaley along with you.         Sincerely,      Kumar Ruiz MD

## 2022-06-22 ENCOUNTER — ANESTHESIA EVENT (OUTPATIENT)
Dept: SURGERY | Age: 47
DRG: 231 | End: 2022-06-22
Payer: MEDICAID

## 2022-06-23 ENCOUNTER — ANESTHESIA (OUTPATIENT)
Dept: SURGERY | Age: 47
DRG: 231 | End: 2022-06-23
Payer: MEDICAID

## 2022-06-23 ENCOUNTER — HOSPITAL ENCOUNTER (INPATIENT)
Age: 47
LOS: 4 days | Discharge: HOME | DRG: 231 | End: 2022-06-27
Attending: COLON & RECTAL SURGERY | Admitting: COLON & RECTAL SURGERY
Payer: MEDICAID

## 2022-06-23 DIAGNOSIS — G89.18 ACUTE POST-OPERATIVE PAIN: Primary | ICD-10-CM

## 2022-06-23 PROBLEM — D49.0 RECTAL NEOPLASM: Status: ACTIVE | Noted: 2022-06-23

## 2022-06-23 PROBLEM — D25.9 LEIOMYOMA OF UTERUS, UNSPECIFIED: Status: ACTIVE | Noted: 2022-06-23

## 2022-06-23 PROBLEM — K62.1 RECTAL POLYP: Status: ACTIVE | Noted: 2022-06-23

## 2022-06-23 PROBLEM — D25.9 LEIOMYOMA OF UTERUS, UNSPECIFIED: Chronic | Status: ACTIVE | Noted: 2022-06-23

## 2022-06-23 PROBLEM — K62.1 RECTAL POLYP: Chronic | Status: ACTIVE | Noted: 2022-06-23

## 2022-06-23 LAB
ANION GAP SERPL CALC-SCNC: 10 MMOL/L (ref 5–15)
BUN SERPL-MCNC: 5 MG/DL (ref 6–20)
BUN/CREAT SERPL: 5 (ref 12–20)
CALCIUM SERPL-MCNC: 8.6 MG/DL (ref 8.5–10.1)
CHLORIDE SERPL-SCNC: 105 MMOL/L (ref 97–108)
CO2 SERPL-SCNC: 23 MMOL/L (ref 21–32)
CREAT SERPL-MCNC: 1.06 MG/DL (ref 0.55–1.02)
ERYTHROCYTE [DISTWIDTH] IN BLOOD BY AUTOMATED COUNT: 15.1 % (ref 11.5–14.5)
GLUCOSE SERPL-MCNC: 186 MG/DL (ref 65–100)
HCG UR QL: NEGATIVE
HCT VFR BLD AUTO: 32.4 % (ref 35–47)
HGB BLD-MCNC: 10.4 G/DL (ref 11.5–16)
MAGNESIUM SERPL-MCNC: 2.3 MG/DL (ref 1.6–2.4)
MCH RBC QN AUTO: 30.6 PG (ref 26–34)
MCHC RBC AUTO-ENTMCNC: 32.1 G/DL (ref 30–36.5)
MCV RBC AUTO: 95.3 FL (ref 80–99)
NRBC # BLD: 0 K/UL (ref 0–0.01)
NRBC BLD-RTO: 0 PER 100 WBC
PHOSPHATE SERPL-MCNC: 3.5 MG/DL (ref 2.6–4.7)
PLATELET # BLD AUTO: 281 K/UL (ref 150–400)
PMV BLD AUTO: 9.3 FL (ref 8.9–12.9)
POTASSIUM SERPL-SCNC: 3.6 MMOL/L (ref 3.5–5.1)
RBC # BLD AUTO: 3.4 M/UL (ref 3.8–5.2)
SODIUM SERPL-SCNC: 138 MMOL/L (ref 136–145)
WBC # BLD AUTO: 12.4 K/UL (ref 3.6–11)

## 2022-06-23 PROCEDURE — 77030011808 HC STPLR ENDOSCOPIC J&J -D: Performed by: COLON & RECTAL SURGERY

## 2022-06-23 PROCEDURE — 77030036732 HC RELD STPLR VASC J&J -F: Performed by: COLON & RECTAL SURGERY

## 2022-06-23 PROCEDURE — 77030002882 HC SUT CART KNOT LSIS -B: Performed by: COLON & RECTAL SURGERY

## 2022-06-23 PROCEDURE — 83735 ASSAY OF MAGNESIUM: CPT

## 2022-06-23 PROCEDURE — 2709999900 HC NON-CHARGEABLE SUPPLY: Performed by: COLON & RECTAL SURGERY

## 2022-06-23 PROCEDURE — 77030008684 HC TU ET CUF COVD -B: Performed by: ANESTHESIOLOGY

## 2022-06-23 PROCEDURE — 0DBP4ZZ EXCISION OF RECTUM, PERCUTANEOUS ENDOSCOPIC APPROACH: ICD-10-PCS | Performed by: COLON & RECTAL SURGERY

## 2022-06-23 PROCEDURE — 76210000017 HC OR PH I REC 1.5 TO 2 HR: Performed by: COLON & RECTAL SURGERY

## 2022-06-23 PROCEDURE — 77030008603 HC TRCR ENDOSC EPATH J&J -C: Performed by: COLON & RECTAL SURGERY

## 2022-06-23 PROCEDURE — 74011000250 HC RX REV CODE- 250: Performed by: NURSE ANESTHETIST, CERTIFIED REGISTERED

## 2022-06-23 PROCEDURE — 77030005513 HC CATH URETH FOL11 MDII -B: Performed by: COLON & RECTAL SURGERY

## 2022-06-23 PROCEDURE — 77030016151 HC PROTCTR LNS DFOG COVD -B: Performed by: COLON & RECTAL SURGERY

## 2022-06-23 PROCEDURE — 77030013079 HC BLNKT BAIR HGGR 3M -A: Performed by: ANESTHESIOLOGY

## 2022-06-23 PROCEDURE — 77030040923 HC STPLR ENDOSC ECHELON J&J -E: Performed by: COLON & RECTAL SURGERY

## 2022-06-23 PROCEDURE — 77030039895 HC SYST SMK EVAC LAP COVD -B: Performed by: COLON & RECTAL SURGERY

## 2022-06-23 PROCEDURE — 77030019702 HC WRP THER MENM -C: Performed by: COLON & RECTAL SURGERY

## 2022-06-23 PROCEDURE — 74011250637 HC RX REV CODE- 250/637: Performed by: COLON & RECTAL SURGERY

## 2022-06-23 PROCEDURE — 65270000029 HC RM PRIVATE

## 2022-06-23 PROCEDURE — 77030009527 HC GEL PRT SYS AMR -E: Performed by: COLON & RECTAL SURGERY

## 2022-06-23 PROCEDURE — 0UT94ZZ RESECTION OF UTERUS, PERCUTANEOUS ENDOSCOPIC APPROACH: ICD-10-PCS | Performed by: OBSTETRICS & GYNECOLOGY

## 2022-06-23 PROCEDURE — 0T9B80Z DRAINAGE OF BLADDER WITH DRAINAGE DEVICE, VIA NATURAL OR ARTIFICIAL OPENING ENDOSCOPIC: ICD-10-PCS | Performed by: STUDENT IN AN ORGANIZED HEALTH CARE EDUCATION/TRAINING PROGRAM

## 2022-06-23 PROCEDURE — 77030025303 HC STPLR ENDOSC J&J -G: Performed by: COLON & RECTAL SURGERY

## 2022-06-23 PROCEDURE — 77030041238 HC TBNG INSUF MDII -A: Performed by: COLON & RECTAL SURGERY

## 2022-06-23 PROCEDURE — 77030033639 HC SHR ENDO COAG HARM 36 J&J -E: Performed by: COLON & RECTAL SURGERY

## 2022-06-23 PROCEDURE — 77030009968 HC RELD STPLR ENDOSC J&J -D: Performed by: COLON & RECTAL SURGERY

## 2022-06-23 PROCEDURE — 84100 ASSAY OF PHOSPHORUS: CPT

## 2022-06-23 PROCEDURE — 0UT74ZZ RESECTION OF BILATERAL FALLOPIAN TUBES, PERCUTANEOUS ENDOSCOPIC APPROACH: ICD-10-PCS | Performed by: OBSTETRICS & GYNECOLOGY

## 2022-06-23 PROCEDURE — 77030040361 HC SLV COMPR DVT MDII -B: Performed by: COLON & RECTAL SURGERY

## 2022-06-23 PROCEDURE — 80048 BASIC METABOLIC PNL TOTAL CA: CPT

## 2022-06-23 PROCEDURE — 77030008608 HC TRCR ENDOSC SMTH AMR -B: Performed by: COLON & RECTAL SURGERY

## 2022-06-23 PROCEDURE — 77030012770 HC TRCR OPT FX AMR -B: Performed by: COLON & RECTAL SURGERY

## 2022-06-23 PROCEDURE — 74011250636 HC RX REV CODE- 250/636: Performed by: ANESTHESIOLOGY

## 2022-06-23 PROCEDURE — 77030008756 HC TU IRR SUC STRY -B: Performed by: COLON & RECTAL SURGERY

## 2022-06-23 PROCEDURE — 77030025625 HC DEV TISS SEAL J&J -F: Performed by: COLON & RECTAL SURGERY

## 2022-06-23 PROCEDURE — P9045 ALBUMIN (HUMAN), 5%, 250 ML: HCPCS | Performed by: ANESTHESIOLOGY

## 2022-06-23 PROCEDURE — C1758 CATHETER, URETERAL: HCPCS | Performed by: COLON & RECTAL SURGERY

## 2022-06-23 PROCEDURE — 74011250636 HC RX REV CODE- 250/636: Performed by: NURSE ANESTHETIST, CERTIFIED REGISTERED

## 2022-06-23 PROCEDURE — 74011250636 HC RX REV CODE- 250/636: Performed by: COLON & RECTAL SURGERY

## 2022-06-23 PROCEDURE — 74011000250 HC RX REV CODE- 250: Performed by: COLON & RECTAL SURGERY

## 2022-06-23 PROCEDURE — 76060000044 HC ANESTHESIA 6.5 TO 7 HR: Performed by: COLON & RECTAL SURGERY

## 2022-06-23 PROCEDURE — 77030002933 HC SUT MCRYL J&J -A: Performed by: COLON & RECTAL SURGERY

## 2022-06-23 PROCEDURE — 88307 TISSUE EXAM BY PATHOLOGIST: CPT

## 2022-06-23 PROCEDURE — 77030005546 HC CATH URETH FOL 3W BARD -A: Performed by: COLON & RECTAL SURGERY

## 2022-06-23 PROCEDURE — 77030026438 HC STYL ET INTUB CARD -A: Performed by: ANESTHESIOLOGY

## 2022-06-23 PROCEDURE — 74011000250 HC RX REV CODE- 250: Performed by: ANESTHESIOLOGY

## 2022-06-23 PROCEDURE — 77030010507 HC ADH SKN DERMBND J&J -B: Performed by: COLON & RECTAL SURGERY

## 2022-06-23 PROCEDURE — 77030002904 HC SUT DEV RNNG LSIS -C: Performed by: COLON & RECTAL SURGERY

## 2022-06-23 PROCEDURE — 77030002903 HC SUT DEV PLCMNT LSIS -C: Performed by: COLON & RECTAL SURGERY

## 2022-06-23 PROCEDURE — 77030009851 HC PCH RTVR ENDOSC AMR -B: Performed by: COLON & RECTAL SURGERY

## 2022-06-23 PROCEDURE — 77030002996 HC SUT SLK J&J -A: Performed by: COLON & RECTAL SURGERY

## 2022-06-23 PROCEDURE — 0D1N4ZP BYPASS SIGMOID COLON TO RECTUM, PERCUTANEOUS ENDOSCOPIC APPROACH: ICD-10-PCS | Performed by: COLON & RECTAL SURGERY

## 2022-06-23 PROCEDURE — 0DTN4ZZ RESECTION OF SIGMOID COLON, PERCUTANEOUS ENDOSCOPIC APPROACH: ICD-10-PCS | Performed by: COLON & RECTAL SURGERY

## 2022-06-23 PROCEDURE — 77030019927 HC TBNG IRR CYSTO BAXT -A: Performed by: COLON & RECTAL SURGERY

## 2022-06-23 PROCEDURE — 85027 COMPLETE CBC AUTOMATED: CPT

## 2022-06-23 PROCEDURE — 77030002968 HC SUT PDS LSIS -B: Performed by: COLON & RECTAL SURGERY

## 2022-06-23 PROCEDURE — 88309 TISSUE EXAM BY PATHOLOGIST: CPT

## 2022-06-23 PROCEDURE — 77030042556 HC PNCL CAUT -B: Performed by: COLON & RECTAL SURGERY

## 2022-06-23 PROCEDURE — 77030031139 HC SUT VCRL2 J&J -A: Performed by: COLON & RECTAL SURGERY

## 2022-06-23 PROCEDURE — 77030010515 HC APPL ENDOCLP LIG J&J -B: Performed by: COLON & RECTAL SURGERY

## 2022-06-23 PROCEDURE — 76010000180 HC OR TIME 6.5 TO 7 HR INTENSV-TIER 1: Performed by: COLON & RECTAL SURGERY

## 2022-06-23 PROCEDURE — 77030008602 HC TRCR ENDOSC EPATH J&J -B: Performed by: COLON & RECTAL SURGERY

## 2022-06-23 PROCEDURE — 77030002986 HC SUT PROL J&J -A: Performed by: COLON & RECTAL SURGERY

## 2022-06-23 PROCEDURE — 0T788DZ DILATION OF BILATERAL URETERS WITH INTRALUMINAL DEVICE, VIA NATURAL OR ARTIFICIAL OPENING ENDOSCOPIC: ICD-10-PCS | Performed by: STUDENT IN AN ORGANIZED HEALTH CARE EDUCATION/TRAINING PROGRAM

## 2022-06-23 PROCEDURE — 77030040831 HC BAG URINE DRNG MDII -A: Performed by: COLON & RECTAL SURGERY

## 2022-06-23 PROCEDURE — 88305 TISSUE EXAM BY PATHOLOGIST: CPT

## 2022-06-23 PROCEDURE — 77030040506 HC DRN WND MDII -A: Performed by: COLON & RECTAL SURGERY

## 2022-06-23 PROCEDURE — 81025 URINE PREGNANCY TEST: CPT

## 2022-06-23 PROCEDURE — 77030002966 HC SUT PDS J&J -A: Performed by: COLON & RECTAL SURGERY

## 2022-06-23 RX ORDER — MIDAZOLAM HYDROCHLORIDE 1 MG/ML
1 INJECTION, SOLUTION INTRAMUSCULAR; INTRAVENOUS AS NEEDED
Status: COMPLETED | OUTPATIENT
Start: 2022-06-23 | End: 2022-06-23

## 2022-06-23 RX ORDER — FENTANYL CITRATE 50 UG/ML
INJECTION, SOLUTION INTRAMUSCULAR; INTRAVENOUS AS NEEDED
Status: DISCONTINUED | OUTPATIENT
Start: 2022-06-23 | End: 2022-06-23 | Stop reason: HOSPADM

## 2022-06-23 RX ORDER — CELECOXIB 200 MG/1
200 CAPSULE ORAL ONCE
Status: COMPLETED | OUTPATIENT
Start: 2022-06-23 | End: 2022-06-23

## 2022-06-23 RX ORDER — SODIUM CHLORIDE 0.9 % (FLUSH) 0.9 %
5-40 SYRINGE (ML) INJECTION EVERY 8 HOURS
Status: DISCONTINUED | OUTPATIENT
Start: 2022-06-23 | End: 2022-06-23 | Stop reason: HOSPADM

## 2022-06-23 RX ORDER — ACETAMINOPHEN 500 MG
1000 TABLET ORAL EVERY 6 HOURS
Status: DISCONTINUED | OUTPATIENT
Start: 2022-06-23 | End: 2022-06-23 | Stop reason: SDUPTHER

## 2022-06-23 RX ORDER — ONDANSETRON 2 MG/ML
4 INJECTION INTRAMUSCULAR; INTRAVENOUS AS NEEDED
Status: DISCONTINUED | OUTPATIENT
Start: 2022-06-23 | End: 2022-06-23 | Stop reason: HOSPADM

## 2022-06-23 RX ORDER — ACETAMINOPHEN 500 MG
1000 TABLET ORAL ONCE
Status: COMPLETED | OUTPATIENT
Start: 2022-06-23 | End: 2022-06-23

## 2022-06-23 RX ORDER — PROPOFOL 10 MG/ML
INJECTION, EMULSION INTRAVENOUS AS NEEDED
Status: DISCONTINUED | OUTPATIENT
Start: 2022-06-23 | End: 2022-06-23 | Stop reason: HOSPADM

## 2022-06-23 RX ORDER — ROPIVACAINE HYDROCHLORIDE 5 MG/ML
30 INJECTION, SOLUTION EPIDURAL; INFILTRATION; PERINEURAL AS NEEDED
Status: DISCONTINUED | OUTPATIENT
Start: 2022-06-23 | End: 2022-06-23 | Stop reason: HOSPADM

## 2022-06-23 RX ORDER — SODIUM CHLORIDE, SODIUM LACTATE, POTASSIUM CHLORIDE, CALCIUM CHLORIDE 600; 310; 30; 20 MG/100ML; MG/100ML; MG/100ML; MG/100ML
75 INJECTION, SOLUTION INTRAVENOUS CONTINUOUS
Status: DISPENSED | OUTPATIENT
Start: 2022-06-23 | End: 2022-06-24

## 2022-06-23 RX ORDER — MIDAZOLAM HYDROCHLORIDE 1 MG/ML
1 INJECTION, SOLUTION INTRAMUSCULAR; INTRAVENOUS AS NEEDED
Status: DISCONTINUED | OUTPATIENT
Start: 2022-06-23 | End: 2022-06-23 | Stop reason: HOSPADM

## 2022-06-23 RX ORDER — DIPHENHYDRAMINE HYDROCHLORIDE 50 MG/ML
12.5 INJECTION, SOLUTION INTRAMUSCULAR; INTRAVENOUS AS NEEDED
Status: DISCONTINUED | OUTPATIENT
Start: 2022-06-23 | End: 2022-06-23 | Stop reason: HOSPADM

## 2022-06-23 RX ORDER — ACETAMINOPHEN 325 MG/1
650 TABLET ORAL ONCE
Status: DISCONTINUED | OUTPATIENT
Start: 2022-06-23 | End: 2022-06-23

## 2022-06-23 RX ORDER — SODIUM CHLORIDE, SODIUM LACTATE, POTASSIUM CHLORIDE, CALCIUM CHLORIDE 600; 310; 30; 20 MG/100ML; MG/100ML; MG/100ML; MG/100ML
125 INJECTION, SOLUTION INTRAVENOUS CONTINUOUS
Status: DISCONTINUED | OUTPATIENT
Start: 2022-06-23 | End: 2022-06-23 | Stop reason: HOSPADM

## 2022-06-23 RX ORDER — HYDROMORPHONE HYDROCHLORIDE 2 MG/ML
INJECTION, SOLUTION INTRAMUSCULAR; INTRAVENOUS; SUBCUTANEOUS AS NEEDED
Status: DISCONTINUED | OUTPATIENT
Start: 2022-06-23 | End: 2022-06-23 | Stop reason: HOSPADM

## 2022-06-23 RX ORDER — ONDANSETRON 4 MG/1
TABLET, ORALLY DISINTEGRATING ORAL
COMMUNITY
Start: 2022-06-22

## 2022-06-23 RX ORDER — SODIUM CHLORIDE 0.9 % (FLUSH) 0.9 %
5-40 SYRINGE (ML) INJECTION AS NEEDED
Status: DISCONTINUED | OUTPATIENT
Start: 2022-06-23 | End: 2022-06-23 | Stop reason: HOSPADM

## 2022-06-23 RX ORDER — LIDOCAINE HYDROCHLORIDE 10 MG/ML
0.5 INJECTION, SOLUTION EPIDURAL; INFILTRATION; INTRACAUDAL; PERINEURAL AS NEEDED
Status: DISCONTINUED | OUTPATIENT
Start: 2022-06-23 | End: 2022-06-23 | Stop reason: HOSPADM

## 2022-06-23 RX ORDER — FENTANYL CITRATE 50 UG/ML
25 INJECTION, SOLUTION INTRAMUSCULAR; INTRAVENOUS
Status: DISCONTINUED | OUTPATIENT
Start: 2022-06-23 | End: 2022-06-23 | Stop reason: HOSPADM

## 2022-06-23 RX ORDER — ROCURONIUM BROMIDE 10 MG/ML
INJECTION, SOLUTION INTRAVENOUS AS NEEDED
Status: DISCONTINUED | OUTPATIENT
Start: 2022-06-23 | End: 2022-06-23 | Stop reason: HOSPADM

## 2022-06-23 RX ORDER — KETAMINE HYDROCHLORIDE 10 MG/ML
INJECTION, SOLUTION INTRAMUSCULAR; INTRAVENOUS AS NEEDED
Status: DISCONTINUED | OUTPATIENT
Start: 2022-06-23 | End: 2022-06-23 | Stop reason: HOSPADM

## 2022-06-23 RX ORDER — DEXMEDETOMIDINE HYDROCHLORIDE 100 UG/ML
INJECTION, SOLUTION INTRAVENOUS AS NEEDED
Status: DISCONTINUED | OUTPATIENT
Start: 2022-06-23 | End: 2022-06-23 | Stop reason: HOSPADM

## 2022-06-23 RX ORDER — MORPHINE SULFATE 2 MG/ML
2 INJECTION, SOLUTION INTRAMUSCULAR; INTRAVENOUS
Status: DISCONTINUED | OUTPATIENT
Start: 2022-06-23 | End: 2022-06-23 | Stop reason: HOSPADM

## 2022-06-23 RX ORDER — MIDAZOLAM HYDROCHLORIDE 1 MG/ML
INJECTION, SOLUTION INTRAMUSCULAR; INTRAVENOUS AS NEEDED
Status: DISCONTINUED | OUTPATIENT
Start: 2022-06-23 | End: 2022-06-23 | Stop reason: HOSPADM

## 2022-06-23 RX ORDER — ONDANSETRON 2 MG/ML
INJECTION INTRAMUSCULAR; INTRAVENOUS AS NEEDED
Status: DISCONTINUED | OUTPATIENT
Start: 2022-06-23 | End: 2022-06-23 | Stop reason: HOSPADM

## 2022-06-23 RX ORDER — METRONIDAZOLE 500 MG/100ML
500 INJECTION, SOLUTION INTRAVENOUS ONCE
Status: COMPLETED | OUTPATIENT
Start: 2022-06-23 | End: 2022-06-23

## 2022-06-23 RX ORDER — LIDOCAINE HYDROCHLORIDE ANHYDROUS AND DEXTROSE MONOHYDRATE .8; 5 G/100ML; G/100ML
1 INJECTION, SOLUTION INTRAVENOUS CONTINUOUS
Status: DISCONTINUED | OUTPATIENT
Start: 2022-06-23 | End: 2022-06-25

## 2022-06-23 RX ORDER — KETOROLAC TROMETHAMINE 30 MG/ML
15 INJECTION, SOLUTION INTRAMUSCULAR; INTRAVENOUS EVERY 6 HOURS
Status: COMPLETED | OUTPATIENT
Start: 2022-06-24 | End: 2022-06-24

## 2022-06-23 RX ORDER — OXYCODONE HYDROCHLORIDE 5 MG/1
5 TABLET ORAL AS NEEDED
Status: DISCONTINUED | OUTPATIENT
Start: 2022-06-23 | End: 2022-06-23 | Stop reason: HOSPADM

## 2022-06-23 RX ORDER — SUCCINYLCHOLINE CHLORIDE 20 MG/ML
INJECTION INTRAMUSCULAR; INTRAVENOUS AS NEEDED
Status: DISCONTINUED | OUTPATIENT
Start: 2022-06-23 | End: 2022-06-23 | Stop reason: HOSPADM

## 2022-06-23 RX ORDER — DEXTROSE, SODIUM CHLORIDE, SODIUM LACTATE, POTASSIUM CHLORIDE, AND CALCIUM CHLORIDE 5; .6; .31; .03; .02 G/100ML; G/100ML; G/100ML; G/100ML; G/100ML
125 INJECTION, SOLUTION INTRAVENOUS CONTINUOUS
Status: DISCONTINUED | OUTPATIENT
Start: 2022-06-23 | End: 2022-06-23 | Stop reason: HOSPADM

## 2022-06-23 RX ORDER — LIDOCAINE HYDROCHLORIDE 20 MG/ML
INJECTION, SOLUTION EPIDURAL; INFILTRATION; INTRACAUDAL; PERINEURAL AS NEEDED
Status: DISCONTINUED | OUTPATIENT
Start: 2022-06-23 | End: 2022-06-23 | Stop reason: HOSPADM

## 2022-06-23 RX ORDER — MAGNESIUM SULFATE HEPTAHYDRATE 40 MG/ML
INJECTION, SOLUTION INTRAVENOUS AS NEEDED
Status: DISCONTINUED | OUTPATIENT
Start: 2022-06-23 | End: 2022-06-23 | Stop reason: HOSPADM

## 2022-06-23 RX ORDER — SODIUM CHLORIDE, SODIUM LACTATE, POTASSIUM CHLORIDE, CALCIUM CHLORIDE 600; 310; 30; 20 MG/100ML; MG/100ML; MG/100ML; MG/100ML
INJECTION, SOLUTION INTRAVENOUS
Status: DISCONTINUED | OUTPATIENT
Start: 2022-06-23 | End: 2022-06-23 | Stop reason: HOSPADM

## 2022-06-23 RX ORDER — FENTANYL CITRATE 50 UG/ML
50 INJECTION, SOLUTION INTRAMUSCULAR; INTRAVENOUS AS NEEDED
Status: DISCONTINUED | OUTPATIENT
Start: 2022-06-23 | End: 2022-06-23 | Stop reason: HOSPADM

## 2022-06-23 RX ORDER — ALBUMIN HUMAN 50 G/1000ML
SOLUTION INTRAVENOUS AS NEEDED
Status: DISCONTINUED | OUTPATIENT
Start: 2022-06-23 | End: 2022-06-23 | Stop reason: HOSPADM

## 2022-06-23 RX ORDER — FENTANYL CITRATE 50 UG/ML
25 INJECTION, SOLUTION INTRAMUSCULAR; INTRAVENOUS
Status: COMPLETED | OUTPATIENT
Start: 2022-06-23 | End: 2022-06-23

## 2022-06-23 RX ORDER — BUPIVACAINE HYDROCHLORIDE AND EPINEPHRINE 5; 5 MG/ML; UG/ML
INJECTION, SOLUTION EPIDURAL; INTRACAUDAL; PERINEURAL AS NEEDED
Status: DISCONTINUED | OUTPATIENT
Start: 2022-06-23 | End: 2022-06-23 | Stop reason: HOSPADM

## 2022-06-23 RX ORDER — DEXAMETHASONE SODIUM PHOSPHATE 4 MG/ML
INJECTION, SOLUTION INTRA-ARTICULAR; INTRALESIONAL; INTRAMUSCULAR; INTRAVENOUS; SOFT TISSUE AS NEEDED
Status: DISCONTINUED | OUTPATIENT
Start: 2022-06-23 | End: 2022-06-23 | Stop reason: HOSPADM

## 2022-06-23 RX ORDER — MIDAZOLAM HYDROCHLORIDE 1 MG/ML
1 INJECTION, SOLUTION INTRAMUSCULAR; INTRAVENOUS
Status: DISCONTINUED | OUTPATIENT
Start: 2022-06-23 | End: 2022-06-23 | Stop reason: HOSPADM

## 2022-06-23 RX ORDER — SODIUM CHLORIDE, SODIUM LACTATE, POTASSIUM CHLORIDE, CALCIUM CHLORIDE 600; 310; 30; 20 MG/100ML; MG/100ML; MG/100ML; MG/100ML
75 INJECTION, SOLUTION INTRAVENOUS CONTINUOUS
Status: DISCONTINUED | OUTPATIENT
Start: 2022-06-23 | End: 2022-06-23 | Stop reason: HOSPADM

## 2022-06-23 RX ADMIN — SODIUM CHLORIDE, POTASSIUM CHLORIDE, SODIUM LACTATE AND CALCIUM CHLORIDE: 600; 310; 30; 20 INJECTION, SOLUTION INTRAVENOUS at 15:22

## 2022-06-23 RX ADMIN — LIDOCAINE HYDROCHLORIDE 2 MG/HR: 20 INJECTION, SOLUTION EPIDURAL; INFILTRATION; INTRACAUDAL; PERINEURAL at 13:16

## 2022-06-23 RX ADMIN — WATER 2 G: 1 INJECTION INTRAMUSCULAR; INTRAVENOUS; SUBCUTANEOUS at 13:29

## 2022-06-23 RX ADMIN — MIDAZOLAM HYDROCHLORIDE 1 MG: 1 INJECTION, SOLUTION INTRAMUSCULAR; INTRAVENOUS at 12:40

## 2022-06-23 RX ADMIN — FENTANYL CITRATE 25 MCG: 0.05 INJECTION, SOLUTION INTRAMUSCULAR; INTRAVENOUS at 20:15

## 2022-06-23 RX ADMIN — ROCURONIUM BROMIDE 15 MG: 10 SOLUTION INTRAVENOUS at 16:42

## 2022-06-23 RX ADMIN — ROCURONIUM BROMIDE 20 MG: 10 SOLUTION INTRAVENOUS at 14:33

## 2022-06-23 RX ADMIN — KETOROLAC TROMETHAMINE 15 MG: 30 INJECTION, SOLUTION INTRAMUSCULAR; INTRAVENOUS at 23:00

## 2022-06-23 RX ADMIN — SODIUM CHLORIDE, POTASSIUM CHLORIDE, SODIUM LACTATE AND CALCIUM CHLORIDE 125 ML/HR: 600; 310; 30; 20 INJECTION, SOLUTION INTRAVENOUS at 11:26

## 2022-06-23 RX ADMIN — MIDAZOLAM HYDROCHLORIDE 1 MG: 1 INJECTION, SOLUTION INTRAMUSCULAR; INTRAVENOUS at 12:59

## 2022-06-23 RX ADMIN — HYDROMORPHONE HYDROCHLORIDE 0.2 MG: 2 INJECTION, SOLUTION INTRAMUSCULAR; INTRAVENOUS; SUBCUTANEOUS at 16:57

## 2022-06-23 RX ADMIN — MORPHINE SULFATE 2 MG: 2 INJECTION, SOLUTION INTRAMUSCULAR; INTRAVENOUS at 20:30

## 2022-06-23 RX ADMIN — MAGNESIUM SULFATE 2 G: 2 INJECTION INTRAVENOUS at 13:27

## 2022-06-23 RX ADMIN — ACETAMINOPHEN 1000 MG: 160 SOLUTION ORAL at 22:58

## 2022-06-23 RX ADMIN — FENTANYL CITRATE 25 MCG: 50 INJECTION, SOLUTION INTRAMUSCULAR; INTRAVENOUS at 14:34

## 2022-06-23 RX ADMIN — SUGAMMADEX 240 MG: 100 INJECTION, SOLUTION INTRAVENOUS at 19:22

## 2022-06-23 RX ADMIN — DEXMEDETOMIDINE HYDROCHLORIDE 5 MCG: 100 INJECTION, SOLUTION, CONCENTRATE INTRAVENOUS at 15:14

## 2022-06-23 RX ADMIN — FENTANYL CITRATE 25 MCG: 0.05 INJECTION, SOLUTION INTRAMUSCULAR; INTRAVENOUS at 20:10

## 2022-06-23 RX ADMIN — ALBUMIN (HUMAN) 250 ML: 12.5 INJECTION, SOLUTION INTRAVENOUS at 18:27

## 2022-06-23 RX ADMIN — MIDAZOLAM 2 MG: 1 INJECTION INTRAMUSCULAR; INTRAVENOUS at 13:01

## 2022-06-23 RX ADMIN — PROPOFOL 140 MG: 10 INJECTION, EMULSION INTRAVENOUS at 13:10

## 2022-06-23 RX ADMIN — HYDROMORPHONE HYDROCHLORIDE 0.6 MG: 2 INJECTION, SOLUTION INTRAMUSCULAR; INTRAVENOUS; SUBCUTANEOUS at 19:50

## 2022-06-23 RX ADMIN — FENTANYL CITRATE 25 MCG: 50 INJECTION, SOLUTION INTRAMUSCULAR; INTRAVENOUS at 16:47

## 2022-06-23 RX ADMIN — ALBUMIN (HUMAN) 250 ML: 12.5 INJECTION, SOLUTION INTRAVENOUS at 13:45

## 2022-06-23 RX ADMIN — HYDROMORPHONE HYDROCHLORIDE 0.2 MG: 2 INJECTION, SOLUTION INTRAMUSCULAR; INTRAVENOUS; SUBCUTANEOUS at 18:30

## 2022-06-23 RX ADMIN — Medication 50 MG: at 14:06

## 2022-06-23 RX ADMIN — ROCURONIUM BROMIDE 10 MG: 10 SOLUTION INTRAVENOUS at 13:46

## 2022-06-23 RX ADMIN — ACETAMINOPHEN 1000 MG: 500 TABLET ORAL at 11:09

## 2022-06-23 RX ADMIN — METRONIDAZOLE 500 MG: 500 SOLUTION INTRAVENOUS at 13:16

## 2022-06-23 RX ADMIN — SUCCINYLCHOLINE CHLORIDE 160 MG: 20 INJECTION, SOLUTION INTRAMUSCULAR; INTRAVENOUS at 13:10

## 2022-06-23 RX ADMIN — DEXMEDETOMIDINE HYDROCHLORIDE 5 MCG: 100 INJECTION, SOLUTION, CONCENTRATE INTRAVENOUS at 15:13

## 2022-06-23 RX ADMIN — FENTANYL CITRATE 25 MCG: 0.05 INJECTION, SOLUTION INTRAMUSCULAR; INTRAVENOUS at 20:00

## 2022-06-23 RX ADMIN — ONDANSETRON HYDROCHLORIDE 4 MG: 2 INJECTION, SOLUTION INTRAMUSCULAR; INTRAVENOUS at 19:14

## 2022-06-23 RX ADMIN — ROCURONIUM BROMIDE 10 MG: 10 SOLUTION INTRAVENOUS at 17:51

## 2022-06-23 RX ADMIN — SODIUM CHLORIDE, POTASSIUM CHLORIDE, SODIUM LACTATE AND CALCIUM CHLORIDE: 600; 310; 30; 20 INJECTION, SOLUTION INTRAVENOUS at 13:02

## 2022-06-23 RX ADMIN — PHENYLEPHRINE HYDROCHLORIDE 40 MCG/MIN: 10 INJECTION INTRAVENOUS at 13:16

## 2022-06-23 RX ADMIN — SODIUM CHLORIDE, POTASSIUM CHLORIDE, SODIUM LACTATE AND CALCIUM CHLORIDE: 600; 310; 30; 20 INJECTION, SOLUTION INTRAVENOUS at 18:54

## 2022-06-23 RX ADMIN — WATER 2 G: 1 INJECTION INTRAMUSCULAR; INTRAVENOUS; SUBCUTANEOUS at 17:29

## 2022-06-23 RX ADMIN — DEXMEDETOMIDINE HYDROCHLORIDE 5 MCG: 100 INJECTION, SOLUTION, CONCENTRATE INTRAVENOUS at 15:15

## 2022-06-23 RX ADMIN — ROCURONIUM BROMIDE 20 MG: 10 SOLUTION INTRAVENOUS at 18:22

## 2022-06-23 RX ADMIN — ROCURONIUM BROMIDE 10 MG: 10 SOLUTION INTRAVENOUS at 13:10

## 2022-06-23 RX ADMIN — FENTANYL CITRATE 50 MCG: 50 INJECTION, SOLUTION INTRAMUSCULAR; INTRAVENOUS at 15:10

## 2022-06-23 RX ADMIN — LIDOCAINE HYDROCHLORIDE 80 MG: 20 INJECTION, SOLUTION EPIDURAL; INFILTRATION; INTRACAUDAL; PERINEURAL at 13:10

## 2022-06-23 RX ADMIN — FENTANYL CITRATE 25 MCG: 0.05 INJECTION, SOLUTION INTRAMUSCULAR; INTRAVENOUS at 20:25

## 2022-06-23 RX ADMIN — DEXAMETHASONE SODIUM PHOSPHATE 8 MG: 4 INJECTION, SOLUTION INTRAMUSCULAR; INTRAVENOUS at 13:19

## 2022-06-23 RX ADMIN — NALOXEGOL OXALATE 25 MG: 25 TABLET, FILM COATED ORAL at 11:09

## 2022-06-23 RX ADMIN — CELECOXIB 200 MG: 200 CAPSULE ORAL at 11:09

## 2022-06-23 RX ADMIN — ROCURONIUM BROMIDE 20 MG: 10 SOLUTION INTRAVENOUS at 15:42

## 2022-06-23 RX ADMIN — ONDANSETRON HYDROCHLORIDE 4 MG: 2 SOLUTION INTRAMUSCULAR; INTRAVENOUS at 20:35

## 2022-06-23 NOTE — H&P
Colorectal Surgery Pre-Operative History and Physical Examination Note          NAME:  Sonya Solis   :   1975   MRN:   [id number]     Operation Date: 2022    Chief Complaint:  Rectal polyp     History of Present Illness: The patient is a 59-year-old female with large uterine fibroids and long-standing iron deficiency anemia. She was scheduled to undergo a hysterectomy on 2022 to stop her heavy uterine bleeding. On 2022, she underwent an EGD and a colonoscopy. Both procedures were performed by Dr. Luisito Mustafa. The EGD revealed a small hiatal hernia, and biopsies of the esophagus, stomach, and duodenum were normal.    The colonoscopy revealed three diminutive polyps that were completely removed. Two of these were located in the cecum and one was in the transverse colon. It also revealed a non-obstructing 6 cm lateral spreading granular lesion in the rectosigmoid occupying approximately 50% of the circumference. The distal end of the lesion was noted to be located at approximately 9 cm from the anus. Biopsies were obtained, and an [place] ink tattoo was placed just distal to the lesion. The colonic polyps proved to have been tubular adenomas, and the biopsies of the rectosigmoid lesion were described as superficial fragments of tubulovillous adenoma.     The patient has a history of chronic constipation but she has currently been moving her bowels 1 to 2 times per day. There has not been any visible blood in the stool but she has perhaps occasionally seen some on the toilet paper. She reports gassiness but no real abdominal pain, and she denies experiencing any fecal incontinence. She has experienced some unintentional weight loss. Her weight had been as high as 138 pounds, and it decreased to a low of 126 pounds then increased some after she improved her diet and added Ensure to her routine.     Examination in the office on 6/3/2022 revealed grossly normal anal closure and no significant external perineal lesions. Digital rectal examination revealed grossly normal resting sphincter tone and squeezing strength and extrinsic compression of the rectum but no intraluminal mass. Rigid proctosigmoidoscopy revealed the distal margin of the neoplasm at approximately 10 cm from the anal verge and a tattoo at approximately 8 cm from anal verge. A CEA level obtained at [facility] on 6/3/2022 was within normal limits (1.5 ng/mL), and a CT scan of the abdomen and pelvis performed on 6/9/2022 was interpreted as revealing no evidence of metastatic disease. A low anterior resection is indicated for the treatment of the rectal polyp; however, because the hysterectomy is also still indicated but it was not possible to coordinate an operation that would include both me and Zunilda Dykes MD (the patient's gynecologist), Dr. Virginia Tanner asked Lang Ruelas MD to see the patient. He did so on 6/20/2022, and he agreed that the hysterectomy is indicated and that it could be performed during the same operation as the colorectal resection. PMH:  Past Medical History:   Diagnosis Date    Anemia     Anxiety and depression     anxity uses meds    COVID-19 vaccine series completed     Pfizer dose #1 received on 7/9/2021; dose of #2 received on 7/30/2021; dose #3 received on 1/19/2022    Depression     self treats with essential oils    History of colonic polyps     Leiomyoma of uterus     Other ill-defined conditions(799.89)     chronic hives    Vaginal delivery     Twice       PSH:  Past Surgical History:   Procedure Laterality Date    COLONOSCOPY N/A 5/20/2022    Dr. Nadya Prince HX ENDOSCOPY  05/20/2022    EGD; Dr. Eulalia Wing GYN      cerclage x2    HX GYN      Cervical conization    HX HEENT  02/2018    4 teeth removed        Home Medications:  Cannot display prior to admission medications because the patient has not been admitted in this contact.        Hospital Medications:  No current facility-administered medications for this encounter. Current Outpatient Medications   Medication Sig    OTHER Takes 45 mg iron po once daily.  lactose-reduced food (ENSURE PO) Take  by mouth. Drinks one daily.  tranexamic acid (LYSTEDA) 650 mg tab tablet Take  by mouth. Takes 1300 mg po 3 times daily during menses    multivitamin (ONE A DAY) tablet Take 2 Tablets by mouth daily. (Patient not taking: Reported on 6/20/2022)    hydrOXYzine pamoate (VISTARIL) 25 mg capsule Take 1 Capsule by mouth two (2) times daily as needed for Anxiety. (Patient not taking: Reported on 5/20/2022)    cetirizine (ZYRTEC) 10 mg tablet Take 10 mg by mouth daily as needed. Allergies:  No Known Allergies    Family History:  Family History   Problem Relation Age of Onset   Saima Neri Cancer Mother         uterine    Diabetes Mother     Alzheimer's Disease Father     Dementia Father     Hypertension Sister     Hypertension Brother     Diabetes Brother     Breast Cancer Maternal Aunt     Breast Cancer Maternal Uncle     Hypertension Sister     Hypertension Brother        Social History:  Social History     Tobacco Use    Smoking status: Never Smoker    Smokeless tobacco: Never Used   Substance Use Topics    Alcohol use: Yes     Comment: rarely       Review of Systems:    Symptom Y/N Comments  Symptom Y/N Comments   Fever/Chills N   Chest Pain N    Cough N   Abdominal Pain N    Sputum N   Joint Pain     SOB/MOORE N   Pruritis/Rash     Nausea/vomit N   Tolerating PT/OT     Diarrhea N   Tolerating Diet Y    Constipation N   Other       Could NOT obtain due to:        Objective:   No data found. No intake/output data recorded. No intake/output data recorded. PHYSICAL EXAMINATION:    Black female in no apparent distress. The height is 56. The weight is reportedly 132 pounds. [BMI 21.4]    There is no scleral icterus. The lungs are clear to auscultation bilaterally.   The heart sounds are regular in rate and rhythm with no murmurs, gallops, or rubs. The abdomen is soft and non-distended. There is a palpable pelvic mass that is presumably the uterus. The extremities are without edema. The patient is alert and oriented, and there are no gross neurologic deficits. Data Review      6/17/2022 10:30 6/17/2022 10:31   WBC 2.6 (L)    HGB 12.3    HCT 38.7    PLATELET 202    Sodium 138    Potassium 4.1    Chloride 106    CO2 28    Glucose 91    BUN 12    Creatinine 0.77    Calcium 9.7    GFR est AA >60    Bilirubin, total 1.0    Protein, total 7.3    Albumin 3.8    ALT 14    AST 6 (L)    Alk. phosphatase 60    Hemoglobin A1c, (calculated) 5.1    Est. average glucose 100    CA-125  15                     Assessment and Plan:      The rectal polyp is not amenable to colonoscopic excision and, although it seems to be asymptomatic and the biopsies were benign, a low anterior resection is indicated. I have explained to the patient and her  that there is a significant risk that the polyp will either become malignant or that it already contains malignancy and that the biopsies cannot rule that out. The normal CEA level and the CT scan findings are reassuring but non-diagnostic. Because this lesion must be treated as malignant until proven otherwise, we discussed oncologic principles and technical aspects of rectal cancer surgery. The discussion included the required preparation, the risks, the expectations in the hospital, and the likely functional consequences.  It was explained that the risks include, but are not limited to, bleeding (possibly requiring blood transfusion or return to the operating room), infection (wound, urinary tract, intraabdominal or pelvic, pulmonary), anastomotic leakage (possibly requiring the creation of a temporary ostomy), anastomotic stricture, injury to other organs or structures including ureters, urinary bladder, small intestine, and spleen (possibly requiring splenectomy), small bowel obstruction, hernia formation, functional problems, myocardial infarction, stroke, deep venous thrombosis, pulmonary embolism, and death. It has been explained that although a hand-assisted laparoscopic operation is planned it could become necessary to convert to the open operation. It has also been explained that a urologist will be consulted to perform the cystoscopic placement of temporary bilateral ureteral catheters in order to facilitate intraoperative identification of the ureters. The case is complicated by the large fibroid-containing uterus for which a hysterectomy is also indicated. Dr. Sonya Solis has generously agreed to perform that procedure during the same anesthesia event. The patient and her  appeared to have understood all of the above, and she has agreed to proceed.       Principal Problem:    Rectal polyp (6/23/2022)    Active Problems:    Leiomyoma of uterus, unspecified (6/23/2022)

## 2022-06-23 NOTE — PERIOP NOTES
Patient placed in lithotomy on MTRE warming blanket. Gel pad placed under sacrum. Legs supported in yellow fin stirrups. Bilateral arms padded with gel pads and egg crate and tucked at sides, hands and IV sites remained free from compression. Egg crate placed over upper chest tape and safety strap used to secure patient to OR bed. Proper body alignment maintained during procedure. Surgeons assisted and approved final patient position. 3000 ml sterile NACL used prn via suction .     Sterile Betadine paint and [place] on back table

## 2022-06-23 NOTE — H&P
[address] [place] [address] [address], [address] [phone]  F [phone]        Patient ID:  Name:  Marcelina Salguero  MRN:  [id number]  :  1975/46 y.o. Date:  2022      HISTORY OF PRESENT ILLNESS:  Marcelina Salguero is a 55 y.o.  premenopausal female who is a new patient being seen for consultation for hysterectomy. She is referred by Dr. Derrick Salinas with [facility]. She has been having menorrhagia and anemia secondary to uterine fibroids for several years, even to the point of requiring transfusions. Dr. Solomon Bass had discussed surgery with her and was actually planning a robotic-assisted laparoscopic hysterectomy. She recently underwent a colonoscopy which revealed a large polyp in the rectosigmoid colon. The polyp could not be removed endoscopically. She was referred to Dr. Fili Blevins for resection. He is planning on performing a hand-assisted laparoscopic colon resection with reanastomosis. Dr. Sade Younger tried to coordinate with Dr. Solomon Bass or one of her partners, but their schedules did not line up. I have been asked to see her in consultation for a laparoscopic hysterectomy. ROS:   and GI review:  Negative  Cardiopulmonary review:  Negative   Musculoskeletal:  Negative    A comprehensive review of systems was negative except for that written in the History of Present Illness. , 10 point ROS      OB/GYN ROS/HX:  [id number]  Hx of cerclage  Hx of CKC      Problem List:  Patient Active Problem List    Diagnosis Date Noted    Rectal polyp 2022    Leiomyoma of uterus, unspecified 2022    Anemia     Depression      PMH:  Past Medical History:   Diagnosis Date    Anemia     Anxiety and depression     anxity uses meds    COVID-19 vaccine series completed     Pfizer dose #1 received on 2021; dose of #2 received on 2021; dose #3 received on 2022    Depression     self treats with essential oils    History of colonic polyps     Leiomyoma of uterus     Other ill-defined conditions(799.89)     chronic hives    Vaginal delivery     Twice      PSH:  Past Surgical History:   Procedure Laterality Date    COLONOSCOPY N/A 5/20/2022    Dr. Robynn Phalen HX ENDOSCOPY  05/20/2022    EGD; Dr. Sadia Lawler GYN      cerclage x2    HX GYN      Cervical conization    HX HEENT  02/2018    4 teeth removed       Social History:  Social History     Tobacco Use    Smoking status: Never Smoker    Smokeless tobacco: Never Used   Substance Use Topics    Alcohol use: Yes     Comment: rarely      Family History:  Family History   Problem Relation Age of Onset    Cancer Mother         uterine    Diabetes Mother     Alzheimer's Disease Father     Dementia Father     Hypertension Sister     Hypertension Brother     Diabetes Brother     Breast Cancer Maternal Aunt     Breast Cancer Maternal Uncle     Hypertension Sister     Hypertension Brother       Medications: (reviewed)  No current facility-administered medications for this encounter. Current Outpatient Medications   Medication Sig    OTHER Takes 45 mg iron po once daily.  lactose-reduced food (ENSURE PO) Take  by mouth. Drinks one daily.  tranexamic acid (LYSTEDA) 650 mg tab tablet Take  by mouth. Takes 1300 mg po 3 times daily during menses    multivitamin (ONE A DAY) tablet Take 2 Tablets by mouth daily. (Patient not taking: Reported on 6/20/2022)    hydrOXYzine pamoate (VISTARIL) 25 mg capsule Take 1 Capsule by mouth two (2) times daily as needed for Anxiety. (Patient not taking: Reported on 5/20/2022)    cetirizine (ZYRTEC) 10 mg tablet Take 10 mg by mouth daily as needed. Allergies: (reviewed)  No Known Allergies       OBJECTIVE:    Physical Exam:  VITAL SIGNS: There were no vitals filed for this visit. There is no height or weight on file to calculate BMI. GENERAL YARELIS: Conversant, alert, oriented.  No acute distress. HEENT: HEENT. No thyroid enlargement. No JVD. Neck: Supple without restrictions. RESPIRATORY: Clear to auscultation and percussion to the bases. No CVAT. CARDIOVASC: RRR without murmur/rub. GASTROINT: Soft, NT, ND. Mass palpable in the suprapubic region. MUSCULOSKEL: no joint tenderness, deformity or swelling   EXTREMITIES: extremities normal, atraumatic, no cyanosis or edema   PELVIC: Normal external genitalia. Normal vagina. Normal cervix. Enlarged, multilobulated, fibroid uterus measuring about 14-15 weeks in size. The uterus was surprisingly mobile, although she was a bit tender with manipulation. The cul de sac was smooth. The adnexa were not palpable. RECTAL: Deferred   ROJAS SURVEY: No suspicious lymphadenopathy or edema noted. NEURO: Grossly intact. No acute deficit. Lab Data:    Lab Results   Component Value Date/Time    WBC 2.6 (L) 06/17/2022 10:30 AM    HGB 12.3 06/17/2022 10:30 AM    HCT 38.7 06/17/2022 10:30 AM    PLATELET 324 61/50/4146 10:30 AM    MCV 92.4 06/17/2022 10:30 AM     Lab Results   Component Value Date/Time    Sodium 138 06/17/2022 10:30 AM    Potassium 4.1 06/17/2022 10:30 AM    Chloride 106 06/17/2022 10:30 AM    CO2 28 06/17/2022 10:30 AM    Anion gap 4 (L) 06/17/2022 10:30 AM    Glucose 91 06/17/2022 10:30 AM    BUN 12 06/17/2022 10:30 AM    Creatinine 0.77 06/17/2022 10:30 AM    BUN/Creatinine ratio 16 06/17/2022 10:30 AM    GFR est AA >60 06/17/2022 10:30 AM    GFR est non-AA >60 06/17/2022 10:30 AM    Calcium 9.7 06/17/2022 10:30 AM         Pelvic ultrasound (10/7/21)  The uterus measures 121 x 90 x 83 mm. An anterior intramuscular fibroid in the  right lower uterine segment measures 45 x 57 mm. There are probably additional,  smaller, intramuscular fibroids. The endometrium measures 9 mm and is of normal  thickness without focal abnormality. The ovaries are normal in size and  echotexture.   The bilateral normal Doppler flow does not exclude torsion,  however, the likelihood is very low given the greyscale appearance of the  ovaries. The right ovary is obscured by bowel gas and the left ovary measures 24  x 22 x 19 mm. There is no free pelvic fluid.     IMPRESSION  Uterine fibroid(s). CT of abdomen/pelvis (6/9/22)  LOWER THORAX: No significant abnormality in the incidentally imaged lower chest.  LIVER: No mass. BILIARY TREE: Gallbladder is unremarkable. CBD is not dilated. SPLEEN: Unremarkable. PANCREAS: No mass or ductal dilatation. ADRENALS: Unremarkable. KIDNEYS: No mass, calculus, or hydronephrosis. STOMACH: Unremarkable. SMALL BOWEL: No dilatation or wall thickening. COLON: No rectosigmoid CT abnormality to correlate with reported colonoscopic  findings. Colon is otherwise unremarkable with no dilation or wall thickening. There are no suspicious mesial rectal lymph nodes identified. APPENDIX:  Unremarkable. PERITONEUM: No ascites or pneumoperitoneum. RETROPERITONEUM: No lymphadenopathy or aortic aneurysm. REPRODUCTIVE ORGANS: Enlarged heterogeneous leiomyomatous uterus redemonstrated. Ovaries are unremarkable. URINARY BLADDER: No mass or calculus. BONES: Degenerative spine change. No acute fracture or aggressive lesion. ABDOMINAL WALL: No mass or hernia. ADDITIONAL COMMENTS: N/A     IMPRESSION  No evidence CT abnormality at site of reported rectosigmoid lesion  with no evidence of metastatic disease within the abdomen or pelvis. IMPRESSION/PLAN:  Naomi Hauser is a 55 y.o. female with a diagnosis of uterine fibroids, menorrhagia, and resulting anemia. I reviewed with Naomi Hauser her medical records, physical exam, and review of symptoms. I agree that a hysterectomy is indicated. I should be able to perform the procedure without too much difficulty. Removing the specimen will be the most difficult part. Normally I would remove the uterus transvaginally.   Due to the size of the uterus, this would be done with hand-morcellation transvaginally. In this situation, since Dr. Edmundo Gibson will be placing a hand port, we can probably use that incision to remove the uterus intact and without the need for morcellation. Since she is premenopausal, she would like to keep her ovaries, as long as there is no evidence of malignancy. She was counseled on the risks, benefits, indications and alternatives of the planned procedure. We discussed the possible surgical risks of bleeding, infection, potential injury to other organs/structures, and the risks of anesthesia. Her questions were answered to her satisfaction and she wishes to proceed as planned. We will coordinate with Dr. Edmundo Gibson to schedule her surgery. An electronic signature was used to sign this note. Madison Wheeler MD  06/23/22       Date of Surgery Update  [facility] was seen and examined. History and physical has been reviewed. The patient has been examined. There have been no significant clinical changes since the completion of the originally dated History and Physical.  Patient identified by surgeon; surgical site was confirmed by patient and surgeon. We discussed ovarian preservation once again. As long as her ovaries appear normal and there is no evidence of malignancy, she would like to keep her ovaries.      Madison Wheeler MD  June 23, 2022  12:08 PM

## 2022-06-23 NOTE — OP NOTES
Operative Report    Patient: Omari Hatfield MRN: [id number]  SSN: [id number]    YOB: 1975  Age: 55 y.o. Sex: female       Date of Surgery: 6/23/2022     Preoperative Diagnosis: RECTAL NEOPLASM , need for intraoperative ureteral identification    Postoperative Diagnosis: RECTAL NEOPLASM , need for intraoperative ureteral identification    Surgeon(s) and Role:       * Ale Benites MD - Primary    Anesthesia: General     Procedures Performed: cystourethroscopy, bilateral externalized ureteral stent placement, placement of delatorre catheter      Findings:  Normal urethra. Squamous metaplasia of the trigone. Orthotopic ureteral orifices. No bladder masses. Procedure in Detail:   The patient was brought to the operating room. A time out was performed by the team to confirm correct patient, site, and procedure. General anesthesia was induced. The patient was then moved into lithotomy position. The patient was prepped and draped in the usual sterile fashion. Cystourethroscopy was performed with findings as above. The right ureteral orifice was cannulated with a sensor wire. This was easily advanced until resistance was felt in the renal pelvis. A 5fr ureteral catheter was advanced over this and the wire was removed. An identical procedure was performed on the left side. The scope was removed. A 16fr delatorre catheter was placed and 10cc added to the balloon. The ureteral catheters and delatorre were secured with the adapter to a drainage bag. Care of the patient was then turned over to the primary surgeon.        Estimated Blood Loss:  none    Implants: none           Specimens: * No specimens in log *        Drains: bilateral externalized ureteral stents, 16fr delatorre catheter                Complications: None    Disposition: the ureteral catheters and delatorre will be removed at the discretion of the primary team      Signed By:  David Craig MD     June 23, 2022

## 2022-06-23 NOTE — PERIOP NOTES
Spoke with patients  Cynthia Mckeon via cell phone and updated him on start of procedure. Surgeon aware.

## 2022-06-23 NOTE — OP NOTES
Gynecologic Oncology Operative Report    Mercedes Lagos  6/23/2022    Pre-operative dx:  Uterine fibroids    Post-operative dx:  Uterine fibroids    Procedure: Total laparoscopic hysterectomy, bilateral salpingectomy    Surgeon:  Mary Armijo MD    Assistant:  Hanna Cheatham PA-C (Assistance was required due to the difficulty of the procedure. They actively assisted with the necessary dissection and proper identification of relevant anatomy throughout the course of the procedure.)     Anesthesia:  GETA    EBL:  265 cc    Complications:  None    Specimens:    ID Type Source Tests Collected by Time Destination   1 : uterus, cervix, bilateral fallopian tubes Fresh Pelvis  Rupesh Patel MD 6/23/2022 1447 Pathology     Implants:  None    Operative indications:  56 yo BF with a symptomatic fibroid uterus. She has severe menorrhagia to the point of transfusion. She was recently diagnosed with a large rectal polyp that could not be removed endoscopically. She was referred to colorectal surgery for resection. Due to the size of the uterus making it difficult to perform her colon resection and because of the symptoms related to her fibroid uterus, I was asked to see her in consultation for laparoscopic hysterectomy at the time of her colon resection. Operative findings:  Enlarged fibroid uterus (~15 week). Normal tubes and ovaries bilaterally. Procedure in detail:  After the risks, benefits, indications, and alternatives of the procedure were discussed with the patient and informed consent was obtained, the patient was taken to the operating room. She was positively identified, administered general anesthesia, and then placed in the dorsal lithotomy position in [address] [place] [address]. She underwent bilateral ureteral stent placement with urology at the beginning of the procedure. Please see his operative report for additional details. A delatorre catheter was also placed by urology.   Once their portion of the procedure was complete, I performed an exam under anesthesia. She was then prepped and draped in the usual fashion. I then placed a V-Care uterine manipulator. We then proceeded with laparoscopy. Due to the size of the uterus, I opted to placed my initial trocar, a few centimeters above the umbilicus in the midline. A small incision was made with an #11-blade scalpel. A 5 mm blade-less trocar was then directly inserted, with the camera in position to visualize safe entry. Once proper placement was confirmed, the abdomen was then insufflated with carbon dioxide. A 5 mm blade-less trocar was then inserted in each lower quadrant, midway between the anterior superior iliac spine and the umbilicus. These trocars were inserted under direct visualization to ensure safe entry. The abdomen and pelvis were then examined, with the above mentioned findings noted. Pelvic washings were obtained as well. The patient was then placed in Trendelenburg tilt and we then proceeded with the hysterectomy. The left round ligament was identified, then coagulated and transected with the Harmonic Scalpel, allowing entry into the retroperitoneal space. The vesico-uterine peritoneum was divided with the Harmonic Scalpel from the left side across the midline, allowing visualization of the ring of the V-Care manipulator anteriorly. We then identified the left ureter and bluntly developed the perirectal space. The left uterine artery was identified at its origin off of the hypogastric artery, and it was coagulated and transected with the Harmonic Scalpel at that point, with the ureter being held on traction medially to ensure its safety. The left fallopian tube was then isolated, and the mesosalpinx was coagulated and transected with the Harmonic Scalpel to the level of the uterine cornua.   The proper ovarian ligament and the posterior leaf of the broad ligament was then divided with the Harmonic Scalpel down to the level of the V-care ring. We then directed our attention to the right side of the pelvis. The right round ligament was identified and then coagulated and transected with the Harmonic Scalpel, allowing entry into the retroperitoneal space. The remaining vesico-uterine peritoneum was then divided with the Harmonic Scalpel on the right side. The right ureter was then identified and the perirectal space was bluntly developed. The right uterine artery was then identified at its origin off of the hypogastric artery. It was coagulated and transected with the Harmonic Scalpel at that point, with the ureter being held on traction medially to ensure its safety. The right fallopian tube was then isolated, and the mesosalpinx was coagulated and transected with the Harmonic Scalpel to the level of the uterine cornua. The proper ovarian ligament and the posterior leaf of the broad ligament was then divided with the Harmonic Scalpel down to the level of the V-care ring. We then moved anteriorly and the bladder was sharply dissected off of the lower uterine segment and cervix until it was well below the ring of the [employer]. The uterine arteries were then skeletonized bilaterally and then coagulated and transected with the Harmonic Scalpel at the level of the V-Care ring. A circumferential colpotomy was then performed by using the active blade of the Harmonic Scalpel to cut down onto the V-Care ring. Once the colpotomy was completed, the specimen was  from the V-Care and the manipulator was removed. A 15 mm Endocatch bag was then inserted transvaginally into the pelvis. The uterus was then placed into the bag and the drawstring was cinched down. The uterus was then left in the upper abdomen within the bag for later removal through Dr. Melvin Padron incision. A bulb syringe was then placed into the vagina to maintain pneumoperitoneum for vaginal cuff closure.   The cuff was then reapproximated with four figure-of-eight stitches of 2-0 PDS suture using the LSI RD-180 suturing device. The sutures were secured in place with the LSI Ti-Knot device. Excellent reapproximation and hemostasis was noted. The pelvis was then irrigated and all pedicles inspected. The bulb syringe was then removed from the vagina. I then handed the case over to Dr. Radha Serrano for his portion of the procedure. Please see his operative report for additional details.           Mary Armijo MD  6/23/2022  2:55 PM

## 2022-06-23 NOTE — BRIEF OP NOTE
Brief Postoperative Note    Patient: So Delgado  YOB: 1975  MRN: [id number]    Date of Procedure: 6/23/2022     Pre-Op Diagnosis:  Rectal neoplasm. Uterine fibroids. Post-Op Diagnosis:  Rectal neoplasm. Uterine fibroids. Procedure:  Hand-assisted laparoscopic low anterior resection, mobilization of the splenic flexure, and coloproctostomy  Surgeon:  Karly Cohn MD  Surgical Assistants  Daphney Garcia RN; Jennifer Cavazos SA  Anesthesia: General endotracheal  Specimens:   ID Type Source Tests Collected by Time Destination   1 : uterus, cervix, bilateral fallopian tubes Fresh Pelvis  Doug Hester MD 6/23/2022 1447 Pathology   2 : rectum + sigmoid colon Fresh Sigmoid  Doug Hester MD 6/23/2022 1705 Pathology   3 : anastomotic donuts  Fresh Colon  Doug Hester MD 6/23/2022 1711 Pathology   Drains:  10 mm Blair-Chaparro drain in the pelvis  Estimated Blood Loss:  250 mL  Crystalloid:  2100 mL  Albumin:  500 mL  Urine:  072 mL  Complications:  None apparent  Implants:  None  Findings:  Benign-appearing rectal polyp. No evidence of metastatic disease.       Electronically Signed by Karly Cohn MD

## 2022-06-24 LAB
ANION GAP SERPL CALC-SCNC: 6 MMOL/L (ref 5–15)
BUN SERPL-MCNC: 6 MG/DL (ref 6–20)
BUN/CREAT SERPL: 7 (ref 12–20)
CALCIUM SERPL-MCNC: 8.3 MG/DL (ref 8.5–10.1)
CHLORIDE SERPL-SCNC: 107 MMOL/L (ref 97–108)
CO2 SERPL-SCNC: 25 MMOL/L (ref 21–32)
CREAT SERPL-MCNC: 0.82 MG/DL (ref 0.55–1.02)
ERYTHROCYTE [DISTWIDTH] IN BLOOD BY AUTOMATED COUNT: 15.1 % (ref 11.5–14.5)
GLUCOSE SERPL-MCNC: 135 MG/DL (ref 65–100)
HCT VFR BLD AUTO: 29.2 % (ref 35–47)
HGB BLD-MCNC: 9.6 G/DL (ref 11.5–16)
MAGNESIUM SERPL-MCNC: 2.2 MG/DL (ref 1.6–2.4)
MCH RBC QN AUTO: 30.9 PG (ref 26–34)
MCHC RBC AUTO-ENTMCNC: 32.9 G/DL (ref 30–36.5)
MCV RBC AUTO: 93.9 FL (ref 80–99)
NRBC # BLD: 0 K/UL (ref 0–0.01)
NRBC BLD-RTO: 0 PER 100 WBC
PHOSPHATE SERPL-MCNC: 1.8 MG/DL (ref 2.6–4.7)
PLATELET # BLD AUTO: 206 K/UL (ref 150–400)
PMV BLD AUTO: 9.5 FL (ref 8.9–12.9)
POTASSIUM SERPL-SCNC: 3.6 MMOL/L (ref 3.5–5.1)
RBC # BLD AUTO: 3.11 M/UL (ref 3.8–5.2)
SODIUM SERPL-SCNC: 138 MMOL/L (ref 136–145)
WBC # BLD AUTO: 8.7 K/UL (ref 3.6–11)

## 2022-06-24 PROCEDURE — 74011000250 HC RX REV CODE- 250: Performed by: COLON & RECTAL SURGERY

## 2022-06-24 PROCEDURE — 36415 COLL VENOUS BLD VENIPUNCTURE: CPT

## 2022-06-24 PROCEDURE — 97161 PT EVAL LOW COMPLEX 20 MIN: CPT | Performed by: PHYSICAL THERAPIST

## 2022-06-24 PROCEDURE — 97116 GAIT TRAINING THERAPY: CPT | Performed by: PHYSICAL THERAPIST

## 2022-06-24 PROCEDURE — 74011250636 HC RX REV CODE- 250/636: Performed by: COLON & RECTAL SURGERY

## 2022-06-24 PROCEDURE — 83735 ASSAY OF MAGNESIUM: CPT

## 2022-06-24 PROCEDURE — 85027 COMPLETE CBC AUTOMATED: CPT

## 2022-06-24 PROCEDURE — 97530 THERAPEUTIC ACTIVITIES: CPT | Performed by: PHYSICAL THERAPIST

## 2022-06-24 PROCEDURE — 74011250637 HC RX REV CODE- 250/637: Performed by: COLON & RECTAL SURGERY

## 2022-06-24 PROCEDURE — 65270000029 HC RM PRIVATE

## 2022-06-24 PROCEDURE — 84100 ASSAY OF PHOSPHORUS: CPT

## 2022-06-24 PROCEDURE — 80048 BASIC METABOLIC PNL TOTAL CA: CPT

## 2022-06-24 RX ORDER — SODIUM CHLORIDE 0.9 % (FLUSH) 0.9 %
5-40 SYRINGE (ML) INJECTION EVERY 8 HOURS
Status: DISCONTINUED | OUTPATIENT
Start: 2022-06-24 | End: 2022-06-27 | Stop reason: HOSPADM

## 2022-06-24 RX ORDER — CETIRIZINE HCL 10 MG
10 TABLET ORAL
Status: DISCONTINUED | OUTPATIENT
Start: 2022-06-24 | End: 2022-06-27 | Stop reason: HOSPADM

## 2022-06-24 RX ORDER — DIPHENHYDRAMINE HCL 50 MG
50 CAPSULE ORAL
Status: DISCONTINUED | OUTPATIENT
Start: 2022-06-24 | End: 2022-06-27 | Stop reason: HOSPADM

## 2022-06-24 RX ORDER — HYDROXYZINE 25 MG/1
25 TABLET, FILM COATED ORAL
Status: DISCONTINUED | OUTPATIENT
Start: 2022-06-24 | End: 2022-06-27 | Stop reason: HOSPADM

## 2022-06-24 RX ORDER — OXYCODONE HYDROCHLORIDE 5 MG/1
5 TABLET ORAL
Status: DISCONTINUED | OUTPATIENT
Start: 2022-06-24 | End: 2022-06-27 | Stop reason: HOSPADM

## 2022-06-24 RX ORDER — ONDANSETRON 2 MG/ML
4 INJECTION INTRAMUSCULAR; INTRAVENOUS
Status: DISCONTINUED | OUTPATIENT
Start: 2022-06-24 | End: 2022-06-27 | Stop reason: HOSPADM

## 2022-06-24 RX ORDER — ENOXAPARIN SODIUM 100 MG/ML
40 INJECTION SUBCUTANEOUS DAILY
Status: DISCONTINUED | OUTPATIENT
Start: 2022-06-24 | End: 2022-06-27 | Stop reason: HOSPADM

## 2022-06-24 RX ORDER — BUPIVACAINE HYDROCHLORIDE AND EPINEPHRINE 5; 5 MG/ML; UG/ML
30 INJECTION, SOLUTION EPIDURAL; INTRACAUDAL; PERINEURAL ONCE
Status: DISCONTINUED | OUTPATIENT
Start: 2022-06-24 | End: 2022-06-24

## 2022-06-24 RX ORDER — CELECOXIB 100 MG/1
100 CAPSULE ORAL 2 TIMES DAILY
Status: DISCONTINUED | OUTPATIENT
Start: 2022-06-24 | End: 2022-06-27 | Stop reason: HOSPADM

## 2022-06-24 RX ORDER — ONDANSETRON 4 MG/1
4 TABLET, ORALLY DISINTEGRATING ORAL
Status: DISCONTINUED | OUTPATIENT
Start: 2022-06-24 | End: 2022-06-27 | Stop reason: HOSPADM

## 2022-06-24 RX ORDER — HYDROMORPHONE HYDROCHLORIDE 2 MG/1
2 TABLET ORAL
Status: DISCONTINUED | OUTPATIENT
Start: 2022-06-24 | End: 2022-06-26

## 2022-06-24 RX ORDER — DIPHENHYDRAMINE HYDROCHLORIDE 50 MG/ML
50 INJECTION, SOLUTION INTRAMUSCULAR; INTRAVENOUS ONCE
Status: COMPLETED | OUTPATIENT
Start: 2022-06-24 | End: 2022-06-24

## 2022-06-24 RX ORDER — SODIUM CHLORIDE 0.9 % (FLUSH) 0.9 %
5-40 SYRINGE (ML) INJECTION AS NEEDED
Status: DISCONTINUED | OUTPATIENT
Start: 2022-06-24 | End: 2022-06-27 | Stop reason: HOSPADM

## 2022-06-24 RX ADMIN — KETOROLAC TROMETHAMINE 15 MG: 30 INJECTION, SOLUTION INTRAMUSCULAR; INTRAVENOUS at 12:22

## 2022-06-24 RX ADMIN — POTASSIUM PHOSPHATE, MONOBASIC AND POTASSIUM PHOSPHATE, DIBASIC: 224; 236 INJECTION, SOLUTION, CONCENTRATE INTRAVENOUS at 09:13

## 2022-06-24 RX ADMIN — OXYCODONE 5 MG: 5 TABLET ORAL at 09:13

## 2022-06-24 RX ADMIN — NALOXEGOL OXALATE 25 MG: 12.5 TABLET, FILM COATED ORAL at 07:31

## 2022-06-24 RX ADMIN — KETOROLAC TROMETHAMINE 15 MG: 30 INJECTION, SOLUTION INTRAMUSCULAR; INTRAVENOUS at 17:02

## 2022-06-24 RX ADMIN — ACETAMINOPHEN 1000 MG: 160 SOLUTION ORAL at 05:01

## 2022-06-24 RX ADMIN — SODIUM CHLORIDE, PRESERVATIVE FREE 10 ML: 5 INJECTION INTRAVENOUS at 17:02

## 2022-06-24 RX ADMIN — KETOROLAC TROMETHAMINE 15 MG: 30 INJECTION, SOLUTION INTRAMUSCULAR; INTRAVENOUS at 05:01

## 2022-06-24 RX ADMIN — ACETAMINOPHEN 1000 MG: 160 SOLUTION ORAL at 09:13

## 2022-06-24 RX ADMIN — LIDOCAINE HYDROCHLORIDE ANHYDROUS AND DEXTROSE MONOHYDRATE 1 MG/KG/HR: .8; 5 INJECTION, SOLUTION INTRAVENOUS at 09:28

## 2022-06-24 RX ADMIN — ACETAMINOPHEN 1000 MG: 160 SOLUTION ORAL at 17:01

## 2022-06-24 RX ADMIN — OXYCODONE 5 MG: 5 TABLET ORAL at 16:01

## 2022-06-24 RX ADMIN — ACETAMINOPHEN 1000 MG: 160 SOLUTION ORAL at 23:00

## 2022-06-24 RX ADMIN — ONDANSETRON HYDROCHLORIDE 4 MG: 2 SOLUTION INTRAMUSCULAR; INTRAVENOUS at 05:01

## 2022-06-24 RX ADMIN — DIPHENHYDRAMINE HYDROCHLORIDE 50 MG: 50 INJECTION INTRAMUSCULAR; INTRAVENOUS at 22:54

## 2022-06-24 RX ADMIN — SODIUM CHLORIDE, PRESERVATIVE FREE 10 ML: 5 INJECTION INTRAVENOUS at 22:19

## 2022-06-24 NOTE — CONSULTS
Nutrition Education    · Educated on Low Fiber and High Kcal diet per pt request to help her gain weight. · Learners: Patient and Significant Other  · Readiness: Eager  · Method: Explanation and Handout  · Response: Verbalizes Understanding  · Contact name and number provided.     Nayla Hein RD  Contact via [employer]

## 2022-06-24 NOTE — PROGRESS NOTES
Transition of Care: TBD; hopefully home with f/u with specialist/pcp    Transport Plan: TBD; likely in car with family    RUR: 4%    DX: rectal neoplasm/scheduled procedure    Main contact is - Fernando Smalls- [phone]    Discharge pending:  -patient is POD#1 resection procedure with specialist  -pending pain control  -patient continues with a drain  -pending medical progress and care recommendations    [phone]: this CM met with patient and her  at bedside; patient and  live at stated address in [address] [place] [address]; patient is normally independent in her ADLs; patients pcp is Loy Conner MD and preferred pharmacy is the [facility] in [place]; patient has no history of home health services; insurance confirmed at [employer]    Reason for Admission:   rectal neoplasm/scheduled procedure                     RUR Score:     4%                Plan for utilizing home health:   Likely none will be needed       PCP: First and Last name:  Dru Gaona MD     Name of Practice:    Are you a current patient: Yes/No: yes   Approximate date of last visit: April 2022   Can you participate in a virtual visit with your PCP: unknown                    Current Advanced Directive/Advance Care Plan: Full Code      Healthcare Decision Maker: - Gaby Running here to complete [address] including selection of the Healthcare Decision Maker Relationship (ie \"Primary\")                             Transition of Care Plan:   TBD; likely home with f/u with specialist/pcp and transport in car with family    Care Management Interventions  PCP Verified by CM: Yes Loy Conner MD)  Mode of Transport at Discharge:  Other (see comment) (likely in car with family)  Physical Therapy Consult: Yes  Occupational Therapy Consult: No  Speech Therapy Consult: No  Support Systems: Spouse/Significant Other  Discharge Location  Patient Expects to be Discharged to[de-identified] Other: (likely home with f/u with specialist/pcp)     CM following  Nathalie Armstrong, RN, CRM

## 2022-06-24 NOTE — PROGRESS NOTES
General Daily Progress Note    Admission Date: 6/23/2022  Hospital Day 2  Post-Op Day 1  Subjective:   Pain control adequate. Mild nausea. Felt a bit dizzy with ambulation. No visual changes. Objective:   Patient Vitals for the past 24 hrs:   BP Temp Pulse Resp SpO2 Weight   06/24/22 0819 103/62 98.4 °F (36.9 °C) 90 16 97 % --   06/24/22 0030 111/64 99.6 °F (37.6 °C) 98 16 99 % --   06/23/22 2223 112/69 98.2 °F (36.8 °C) (!) 113 16 99 % --   06/23/22 2130 116/61 -- (!) 116 10 100 % --   06/23/22 2100 (!) 106/56 99 °F (37.2 °C) (!) 110 11 100 % --   06/23/22 2050 -- -- (!) 107 13 97 % --   06/23/22 2045 (!) 100/56 -- (!) 110 11 98 % --   06/23/22 2040 -- -- (!) 112 11 98 % --   06/23/22 2035 -- -- (!) 112 8 100 % --   06/23/22 2030 (!) 105/55 -- (!) 116 11 100 % --   06/23/22 2020 -- -- (!) 115 19 100 % --   06/23/22 2015 (!) 120/57 -- (!) 119 11 100 % --   06/23/22 2010 (!) 111/56 -- (!) 121 10 100 % --   06/23/22 2005 120/60 -- (!) 121 13 100 % --   06/23/22 2000 122/62 -- (!) 112 16 100 % --   06/23/22 1955 (!) 118/58 -- (!) 113 13 100 % --   06/23/22 1951 116/60 98.3 °F (36.8 °C) (!) 113 10 100 % --   06/23/22 1059 116/71 98.5 °F (36.9 °C) (!) 102 16 100 % 59 kg (130 lb 1.1 oz)     06/24 0701 - 06/24 1900  In: -   Out: 670 [Urine:600; Drains:70]  06/22 1901 - 06/24 0700  In: 2100 [I.V.:2100]  Out: 36 [Urine:300; Drains:270]    Physical Examination:  General Appearance - Mildly uncomfortable. Chest - Lungs clear to auscultation. Heart - Normal rate and regular rhythm. Abdomen - Non-distended. Incisions clean, dry, intact, and without erythema. GURWINDER drainage bloody.  - Herndon catheter in place. Extremities - Pneumatic compression stockings in use.         Data Review   Recent Results (from the past 24 hour(s))   HCG URINE, QL. - POC    Collection Time: 06/23/22 10:41 AM   Result Value Ref Range    Pregnancy test,urine (POC) Negative NEG     CBC W/O DIFF    Collection Time: 06/23/22  7:53 PM   Result Value Ref Range    WBC 12.4 (H) 3.6 - 11.0 K/uL    RBC 3.40 (L) 3.80 - 5.20 M/uL    HGB 10.4 (L) 11.5 - 16.0 g/dL    HCT 32.4 (L) 35.0 - 47.0 %    MCV 95.3 80.0 - 99.0 FL    MCH 30.6 26.0 - 34.0 PG    MCHC 32.1 30.0 - 36.5 g/dL    RDW 15.1 (H) 11.5 - 14.5 %    PLATELET 656 440 - 659 K/uL    MPV 9.3 8.9 - 12.9 FL    NRBC 0.0 0  WBC    ABSOLUTE NRBC 0.00 0.00 - 8.16 K/uL   METABOLIC PANEL, BASIC    Collection Time: 06/23/22  7:53 PM   Result Value Ref Range    Sodium 138 136 - 145 mmol/L    Potassium 3.6 3.5 - 5.1 mmol/L    Chloride 105 97 - 108 mmol/L    CO2 23 21 - 32 mmol/L    Anion gap 10 5 - 15 mmol/L    Glucose 186 (H) 65 - 100 mg/dL    BUN 5 (L) 6 - 20 MG/DL    Creatinine 1.06 (H) 0.55 - 1.02 MG/DL    BUN/Creatinine ratio 5 (L) 12 - 20      GFR est AA >60 >60 ml/min/1.73m2    GFR est non-AA 56 (L) >60 ml/min/1.73m2    Calcium 8.6 8.5 - 10.1 MG/DL   MAGNESIUM    Collection Time: 06/23/22  7:53 PM   Result Value Ref Range    Magnesium 2.3 1.6 - 2.4 mg/dL   PHOSPHORUS    Collection Time: 06/23/22  7:53 PM   Result Value Ref Range    Phosphorus 3.5 2.6 - 4.7 MG/DL   METABOLIC PANEL, BASIC    Collection Time: 06/24/22  4:31 AM   Result Value Ref Range    Sodium 138 136 - 145 mmol/L    Potassium 3.6 3.5 - 5.1 mmol/L    Chloride 107 97 - 108 mmol/L    CO2 25 21 - 32 mmol/L    Anion gap 6 5 - 15 mmol/L    Glucose 135 (H) 65 - 100 mg/dL    BUN 6 6 - 20 MG/DL    Creatinine 0.82 0.55 - 1.02 MG/DL    BUN/Creatinine ratio 7 (L) 12 - 20      GFR est AA >60 >60 ml/min/1.73m2    GFR est non-AA >60 >60 ml/min/1.73m2    Calcium 8.3 (L) 8.5 - 10.1 MG/DL   MAGNESIUM    Collection Time: 06/24/22  4:31 AM   Result Value Ref Range    Magnesium 2.2 1.6 - 2.4 mg/dL   PHOSPHORUS    Collection Time: 06/24/22  4:31 AM   Result Value Ref Range    Phosphorus 1.8 (L) 2.6 - 4.7 MG/DL   CBC W/O DIFF    Collection Time: 06/24/22  4:31 AM   Result Value Ref Range    WBC 8.7 3.6 - 11.0 K/uL    RBC 3.11 (L) 3.80 - 5.20 M/uL    HGB 9.6 (L) 11.5 - 16.0 g/dL    HCT 29.2 (L) 35.0 - 47.0 %    MCV 93.9 80.0 - 99.0 FL    MCH 30.9 26.0 - 34.0 PG    MCHC 32.9 30.0 - 36.5 g/dL    RDW 15.1 (H) 11.5 - 14.5 %    PLATELET 339 360 - 339 K/uL    MPV 9.5 8.9 - 12.9 FL    NRBC 0.0 0  WBC    ABSOLUTE NRBC 0.00 0.00 - 0.01 K/uL           Assessment:     Principal Problem:    Rectal polyp (6/23/2022)    Active Problems:    Leiomyoma of uterus, unspecified (6/23/2022)      Rectal neoplasm (6/23/2022)        1 Day Post-Op s/p hand-assisted laparoscopic low anterior resection, mobilization of the splenic flexure, and coloproctostomy (combined with laparoscopic hysterectomy and bilateral salpingectomy performed by Dr. Robby Castaneda). Hemodynamically stable. Hemoglobin decreased secondary to intra-operative blood loss, equilibration, and post-operative \"oozing, but acceptable. Good urine output. Creatinine within normal limits. Hypophosphatemic. Probably not ready to advance diet. Plan:   Continue clear liquid diet and Ensure Surgery. Replace phosphorus. Hold Lovenox. Remove Herndon catheter per protocol. Ambulate. Incentive spirometer.

## 2022-06-24 NOTE — PROGRESS NOTES
[address] department of [address], [facility] [address], [address]  STEFF [phone]  F [phone]       Patient Name: Luz Maria Shannon   Admit Date: 6/23/2022   OR Date: 6/23/2022   Visit Date: 6/24/2022        SUBJECTIVE:  Complains of abdominal discomfort and back pain today. Otherwise, did well overnight. Mild spotting. Mild nausea. Has been up OOB already. OBJECTIVE:    Patient Vitals for the past 24 hrs:   Temp Pulse Resp BP SpO2   06/24/22 0819 98.4 °F (36.9 °C) 90 16 103/62 97 %   06/24/22 0030 99.6 °F (37.6 °C) 98 16 111/64 99 %   06/23/22 2223 98.2 °F (36.8 °C) (!) 113 16 112/69 99 %   06/23/22 2130 -- (!) 116 10 116/61 100 %   06/23/22 2100 99 °F (37.2 °C) (!) 110 11 (!) 106/56 100 %   06/23/22 2050 -- (!) 107 13 -- 97 %   06/23/22 2045 -- (!) 110 11 (!) 100/56 98 %   06/23/22 2040 -- (!) 112 11 -- 98 %   06/23/22 2035 -- (!) 112 8 -- 100 %   06/23/22 2030 -- (!) 116 11 (!) 105/55 100 %   06/23/22 2020 -- (!) 115 19 -- 100 %   06/23/22 2015 -- (!) 119 11 (!) 120/57 100 %   06/23/22 2010 -- (!) 121 10 (!) 111/56 100 %   06/23/22 2005 -- (!) 121 13 120/60 100 %   06/23/22 2000 -- (!) 112 16 122/62 100 %   06/23/22 1955 -- (!) 113 13 (!) 118/58 100 %   06/23/22 1951 98.3 °F (36.8 °C) (!) 113 10 116/60 100 %       Date 06/23/22 0700 - 06/24/22 0659 06/24/22 0700 - 06/25/22 0659   Shift 0367-7047 5386-1576 24 Hour Total 9337-9861 3628-1398 24 Hour Total   INTAKE   I.V.(mL/kg/hr) 2000(2.8) 100(0.1) 2100(1.5)        Volume (lactated Ringers infusion) 2000 100 2100      Shift Total(mL/kg) 2000(33.9) 100(1.7) 2100(35.6)      OUTPUT   Urine(mL/kg/hr)  300(0.4) 300(0.2) 600  600     Urine Output  300 300        Urine Output (mL) (Urinary Catheter 06/23/22 Herndon)    600  600   Drains  270 270 245  245     Output (ml) (Drain 06/23/22 Left; Lower Abdomen)  270 270 245  245   Blood  250 250        Estimated Blood Loss  250 250      Shift Total(mL/kg) 820(13.9) 820(13.9) 845(14.3)  845(14.3)   NET 2000 -720 7570 -845  -845   Weight (kg) 59 59 59 59 59 59       Physical Exam     General:  alert, cooperative, no distress     Cardiac:  Regular rate and rhythm        Lungs:  clear to auscultation bilaterally  Abdomen:  soft, nondistended       Wound:  clean, dry   Extremity: extremities normal, atraumatic, no cyanosis or edema    Data Review  Lab Results   Component Value Date/Time    WBC 8.7 06/24/2022 04:31 AM    ABS. NEUTROPHILS 1.8 10/05/2021 09:15 PM    HGB 9.6 (L) 06/24/2022 04:31 AM    HCT 29.2 (L) 06/24/2022 04:31 AM    MCV 93.9 06/24/2022 04:31 AM    MCH 30.9 06/24/2022 04:31 AM    PLATELET 954 88/36/0785 04:31 AM     Lab Results   Component Value Date/Time    Sodium 138 06/24/2022 04:31 AM    Potassium 3.6 06/24/2022 04:31 AM    Chloride 107 06/24/2022 04:31 AM    CO2 25 06/24/2022 04:31 AM    Glucose 135 (H) 06/24/2022 04:31 AM    BUN 6 06/24/2022 04:31 AM    Creatinine 0.82 06/24/2022 04:31 AM    Calcium 8.3 (L) 06/24/2022 04:31 AM    Albumin 3.8 06/17/2022 10:30 AM    Bilirubin, total 1.0 06/17/2022 10:30 AM    ALT (SGPT) 14 06/17/2022 10:30 AM    Alk. phosphatase 60 06/17/2022 10:30 AM     IMPRESSION/PLAN:    1 Day Post-Op Procedure(s):  HAND ASSISTED LAPAROSCOPIC, OPEN LOW ANTERIOR RESECTION, mobilization of splenic flexture, coloproctoscopy. (E R A S)  TEMPORARY URETERAL STENT PLACEMENT  HYSTERECTOMY TOTAL LAPAROSCOPIC, bilateral salpingectomy for RECTAL NEOPLASM, menorrhagia, fibroids    Oncologic:  54 yo BF with a symptomatic fibroid uterus. She has severe menorrhagia to the point of transfusion. Heme/CV:  Hgb 9.6 this morning. VSS. Hemodynamically stable. Mildly tachy   Renal:  creatinine 0.82. Good urine output. Herndon removal per Dr. Janina Earl. FEN/GI:  no dietary restrictions related to hysterectomy surgery. Will defer to Dr. Janina Earl. ID/Wound:  afebrile. PPX:  holding lovenox today. SCDs. Incentive spirometer.   OOB as able. Dispostion:  Doing well postoperatively. Continue routine post op care. Discharge instructions related to hysterectomy discussed. Patient should follow up with Dr. Naomi Hauser in 4 weeks.          Wayne Calloway PA-C  6/24/2022  12:14 PM

## 2022-06-24 NOTE — PROGRESS NOTES
PHYSICAL THERAPY EVALUATION/DISCHARGE  Patient: Tennille Elias (89 y.o. female)  Date: 6/24/2022  Primary Diagnosis: Rectal neoplasm [D49.0]  Procedure(s) (LRB):  HAND ASSISTED LAPAROSCOPIC, OPEN LOW ANTERIOR RESECTION, mobilization of splenic flexture, coloproctoscopy. (E R A S) (N/A)  TEMPORARY URETERAL STENT PLACEMENT (Bilateral)  HYSTERECTOMY TOTAL LAPAROSCOPIC, bilateral salpingectomy (N/A) 1 Day Post-Op   Precautions:          ASSESSMENT  Based on the objective data described below, the patient presents with decreased functional mobility from baseline level of function. Patient currently limited by pain but is otherwise doing well. Able to come to EOB with supervision with cues for log roll. Amb approx 300 feet with support of IV pole but no overt or LOB noted. Patient is safe to be up and ambulatory with  or staff. Does not require further skilled PT and patient is self motivated to mobilize. Please re-consult should patient status change. Other factors to consider for discharge: none     Further skilled acute physical therapy is not indicated at this time. PLAN :  Recommendation for discharge: (in order for the patient to meet his/her long term goals)  No skilled physical therapy/ follow up rehabilitation needs identified at this time. IF patient discharges home will need the following DME: none       SUBJECTIVE:   Patient stated Will the walking help me?     OBJECTIVE DATA SUMMARY:   HISTORY:    Past Medical History:   Diagnosis Date    Anemia     Anxiety and depression     anxity uses meds    COVID-19 vaccine series completed     [employer] dose #1 received on 7/9/2021; dose of #2 received on 7/30/2021; dose #3 received on 1/19/2022    Depression     self treats with essential oils    History of colonic polyps     Leiomyoma of uterus     Other ill-defined conditions(799.89)     chronic hives    Vaginal delivery     Twice     Past Surgical History:   Procedure Laterality Date    COLONOSCOPY N/A 5/20/2022    Dr. Anjana Chowdhury HX ENDOSCOPY  05/20/2022    EGD; Dr. Basilio Busch GYN      cerclage x2    HX GYN      Cervical conization    HX HEENT  02/2018    4 teeth removed        Prior level of function: Independent with functional mobility  Personal factors and/or comorbidities impacting plan of care:     Home Situation  Home Environment: Private residence  One/Two Story Residence: Two story  Living Alone: No  Support Systems: Spouse/Significant Other  Patient Expects to be Discharged to[de-identified] Home  Current DME Used/Available at Home: None    EXAMINATION/PRESENTATION/DECISION MAKING:   Critical Behavior:  Neurologic State: Alert  Orientation Level: Oriented X4  Cognition: Follows commands       Range Of Motion:  AROM: Generally decreased, functional                       Strength:    Strength: Generally decreased, functional                Functional Mobility:  Bed Mobility:  Rolling: Supervision  Supine to Sit: Supervision  Sit to Supine: Supervision (cues for log roll technique)  Scooting: Supervision  Transfers:  Sit to Stand: Modified independent  Stand to Sit: Modified independent                       Balance:   Sitting: Intact  Standing: Intact  Ambulation/Gait Training:  Distance (ft): 300 Feet (ft)  Assistive Device: Gait belt (IV pole support)  Ambulation - Level of Assistance: Supervision     Gait Description (WDL): Exceptions to WDL  Gait Abnormalities: Antalgic;Decreased step clearance        Base of Support: Narrowed     Speed/Rosa: Slow  Step Length: Left shortened;Right shortened       Pain Rating:  Reports pain but does not rate    Activity Tolerance:   Fair and requires rest breaks      After treatment patient left in no apparent distress:   Sitting in chair, Call bell within reach and Caregiver / family present    COMMUNICATION/EDUCATION:   The patients plan of care was discussed with: Physical therapist, Occupational therapist and Registered nurse.      Fall prevention education was provided and the patient/caregiver indicated understanding., Patient/family have participated as able in goal setting and plan of care. and Patient/family agree to work toward stated goals and plan of care.     Thank you for this referral.  Joceline Bhatt, PT, DPT   Time Calculation: 30 mins

## 2022-06-24 NOTE — PERIOP NOTES
TRANSFER - OUT REPORT:    Verbal report given to [facility] (name) on Dilma Aguila  being transferred to [address] [place] [address] (unit) for routine post - op       Report consisted of patients Situation, Background, Assessment and   Recommendations(SBAR). Time Pre op antibiotic given:1329  Anesthesia Stop time: 1952  Herndon Present on Transfer to floor:yes  Order for Herndon on Chart:yes  Discharge Prescriptions with Chart:no    Information from the following report(s) SBAR, OR Summary, Procedure Summary, Intake/Output, MAR, Recent Results and Cardiac Rhythm Sinus Tach was reviewed with the receiving nurse. Opportunity for questions and clarification was provided. Is the patient on 02? NO    Is the patient on a monitor? NO    Is the nurse transporting with the patient? NO    Surgical Waiting Area notified of patient's transfer from PACU? YES      The following personal items collected during your admission accompanied patient upon transfer:   Dental Appliance: Dental Appliances: None  Vision: Visual Aid: Glasses  Hearing Aid:    Jewelry:    Clothing: Clothing:  (belonging bag and glasses sent to pacu)  Other Valuables:    Valuables sent to safe:      >> glasses and belongings with patient in PACU.

## 2022-06-24 NOTE — ANESTHESIA POSTPROCEDURE EVALUATION
Post-Anesthesia Evaluation and Assessment    Patient: Alexander Thorpe MRN: [id number]  SSN: [id number]    YOB: 1975  Age: 55 y.o. Sex: female      I have evaluated the patient and they are stable and ready for discharge from the PACU. Cardiovascular Function/Vital Signs  Visit Vitals  BP (!) 100/56   Pulse (!) 110   Temp 36.8 °C (98.3 °F)   Resp 11   Wt 59 kg (130 lb 1.1 oz)   SpO2 100%   BMI 20.83 kg/m²       Patient is status post General anesthesia for Procedure(s):  HAND ASSISTED LAPAROSCOPIC, OPEN LOW ANTERIOR RESECTION, mobilization of splenic flexture, coloproctoscopy. (E R A S)  TEMPORARY URETERAL STENT PLACEMENT  HYSTERECTOMY TOTAL LAPAROSCOPIC, bilateral salpingectomy. Nausea/Vomiting: None    Postoperative hydration reviewed and adequate. Pain:  Pain Scale 1:  (pt states she is in pain) (06/23/22 1951)  Pain Intensity 1: 3 (06/23/22 1059)   Managed    Neurological Status:   Neuro (WDL): Exceptions to [facility] (06/23/22 1951)  Neuro  Neurologic State: Drowsy; Eyes open to stimulus (06/23/22 1951)  Orientation Level: Oriented to person;Oriented to place;Oriented to situation (06/23/22 1951)  Cognition: Follows commands (06/23/22 1951)  Speech: Clear (06/23/22 1951)  LUE Motor Response: Purposeful (06/23/22 1951)  LLE Motor Response: Purposeful (06/23/22 1951)  RUE Motor Response: Purposeful (06/23/22 1951)  RLE Motor Response: Purposeful (06/23/22 1951)   At baseline    Mental Status, Level of Consciousness: Alert and  oriented to person, place, and time    Pulmonary Status:   O2 Device: None (Room air) (06/23/22 1951)   Adequate oxygenation and airway patent    Complications related to anesthesia: None    Post-anesthesia assessment completed. No concerns    Signed By: Gabrielle Curry MD     June 23, 2022              Procedure(s):  HAND ASSISTED LAPAROSCOPIC, OPEN LOW ANTERIOR RESECTION, mobilization of splenic flexture, coloproctoscopy.   (E R A S)  TEMPORARY URETERAL STENT PLACEMENT  HYSTERECTOMY TOTAL LAPAROSCOPIC, bilateral salpingectomy. general    <BSHSIANPOST>    INITIAL Post-op Vital signs:   Vitals Value Taken Time   /56 06/23/22 2100   Temp 36.8 °C (98.3 °F) 06/23/22 1951   Pulse 122 06/23/22 2110   Resp 12 06/23/22 2110   SpO2 99 % 06/23/22 2110   Vitals shown include unvalidated device data.

## 2022-06-25 LAB
ANION GAP SERPL CALC-SCNC: 3 MMOL/L (ref 5–15)
BASOPHILS # BLD: 0 K/UL (ref 0–0.1)
BASOPHILS NFR BLD: 0 % (ref 0–1)
BUN SERPL-MCNC: 6 MG/DL (ref 6–20)
BUN/CREAT SERPL: 9 (ref 12–20)
CALCIUM SERPL-MCNC: 8.3 MG/DL (ref 8.5–10.1)
CHLORIDE SERPL-SCNC: 108 MMOL/L (ref 97–108)
CO2 SERPL-SCNC: 28 MMOL/L (ref 21–32)
CREAT SERPL-MCNC: 0.67 MG/DL (ref 0.55–1.02)
DIFFERENTIAL METHOD BLD: ABNORMAL
EOSINOPHIL # BLD: 0 K/UL (ref 0–0.4)
EOSINOPHIL NFR BLD: 0 % (ref 0–7)
ERYTHROCYTE [DISTWIDTH] IN BLOOD BY AUTOMATED COUNT: 15 % (ref 11.5–14.5)
GLUCOSE SERPL-MCNC: 97 MG/DL (ref 65–100)
HCT VFR BLD AUTO: 27.2 % (ref 35–47)
HGB BLD-MCNC: 8.9 G/DL (ref 11.5–16)
IMM GRANULOCYTES # BLD AUTO: 0 K/UL (ref 0–0.04)
IMM GRANULOCYTES NFR BLD AUTO: 0 % (ref 0–0.5)
LYMPHOCYTES # BLD: 0.8 K/UL (ref 0.8–3.5)
LYMPHOCYTES NFR BLD: 22 % (ref 12–49)
MCH RBC QN AUTO: 31.3 PG (ref 26–34)
MCHC RBC AUTO-ENTMCNC: 32.7 G/DL (ref 30–36.5)
MCV RBC AUTO: 95.8 FL (ref 80–99)
MONOCYTES # BLD: 0.4 K/UL (ref 0–1)
MONOCYTES NFR BLD: 10 % (ref 5–13)
NEUTS SEG # BLD: 2.6 K/UL (ref 1.8–8)
NEUTS SEG NFR BLD: 68 % (ref 32–75)
NRBC # BLD: 0 K/UL (ref 0–0.01)
NRBC BLD-RTO: 0 PER 100 WBC
PHOSPHATE SERPL-MCNC: 2.4 MG/DL (ref 2.6–4.7)
PLATELET # BLD AUTO: 176 K/UL (ref 150–400)
PMV BLD AUTO: 9.7 FL (ref 8.9–12.9)
POTASSIUM SERPL-SCNC: 3.6 MMOL/L (ref 3.5–5.1)
RBC # BLD AUTO: 2.84 M/UL (ref 3.8–5.2)
SODIUM SERPL-SCNC: 139 MMOL/L (ref 136–145)
WBC # BLD AUTO: 3.8 K/UL (ref 3.6–11)

## 2022-06-25 PROCEDURE — 74011250637 HC RX REV CODE- 250/637: Performed by: COLON & RECTAL SURGERY

## 2022-06-25 PROCEDURE — 36415 COLL VENOUS BLD VENIPUNCTURE: CPT

## 2022-06-25 PROCEDURE — 65270000029 HC RM PRIVATE

## 2022-06-25 PROCEDURE — 80048 BASIC METABOLIC PNL TOTAL CA: CPT

## 2022-06-25 PROCEDURE — 84100 ASSAY OF PHOSPHORUS: CPT

## 2022-06-25 PROCEDURE — 74011000250 HC RX REV CODE- 250: Performed by: COLON & RECTAL SURGERY

## 2022-06-25 PROCEDURE — 85025 COMPLETE CBC W/AUTO DIFF WBC: CPT

## 2022-06-25 PROCEDURE — 74011250636 HC RX REV CODE- 250/636: Performed by: COLON & RECTAL SURGERY

## 2022-06-25 RX ORDER — SODIUM CHLORIDE, SODIUM LACTATE, POTASSIUM CHLORIDE, CALCIUM CHLORIDE 600; 310; 30; 20 MG/100ML; MG/100ML; MG/100ML; MG/100ML
50 INJECTION, SOLUTION INTRAVENOUS CONTINUOUS
Status: DISCONTINUED | OUTPATIENT
Start: 2022-06-25 | End: 2022-06-27

## 2022-06-25 RX ORDER — SIMETHICONE 80 MG
80 TABLET,CHEWABLE ORAL
Status: DISCONTINUED | OUTPATIENT
Start: 2022-06-25 | End: 2022-06-27 | Stop reason: HOSPADM

## 2022-06-25 RX ORDER — LANOLIN ALCOHOL/MO/W.PET/CERES
3 CREAM (GRAM) TOPICAL
Status: DISCONTINUED | OUTPATIENT
Start: 2022-06-25 | End: 2022-06-27 | Stop reason: HOSPADM

## 2022-06-25 RX ADMIN — HYDROMORPHONE HYDROCHLORIDE 2 MG: 2 TABLET ORAL at 20:17

## 2022-06-25 RX ADMIN — HYDROMORPHONE HYDROCHLORIDE 2 MG: 2 TABLET ORAL at 16:06

## 2022-06-25 RX ADMIN — HYDROMORPHONE HYDROCHLORIDE 2 MG: 2 TABLET ORAL at 10:06

## 2022-06-25 RX ADMIN — CELECOXIB 100 MG: 100 CAPSULE ORAL at 17:36

## 2022-06-25 RX ADMIN — CELECOXIB 100 MG: 100 CAPSULE ORAL at 10:06

## 2022-06-25 RX ADMIN — NALOXEGOL OXALATE 25 MG: 12.5 TABLET, FILM COATED ORAL at 06:37

## 2022-06-25 RX ADMIN — ACETAMINOPHEN 1000 MG: 160 SOLUTION ORAL at 17:37

## 2022-06-25 RX ADMIN — POTASSIUM PHOSPHATE, MONOBASIC AND POTASSIUM PHOSPHATE, DIBASIC: 224; 236 INJECTION, SOLUTION, CONCENTRATE INTRAVENOUS at 10:11

## 2022-06-25 RX ADMIN — SODIUM CHLORIDE, POTASSIUM CHLORIDE, SODIUM LACTATE AND CALCIUM CHLORIDE 50 ML/HR: 600; 310; 30; 20 INJECTION, SOLUTION INTRAVENOUS at 10:11

## 2022-06-25 RX ADMIN — SIMETHICONE 80 MG: 80 TABLET, CHEWABLE ORAL at 13:27

## 2022-06-25 RX ADMIN — ACETAMINOPHEN 1000 MG: 160 SOLUTION ORAL at 10:08

## 2022-06-25 RX ADMIN — ACETAMINOPHEN 1000 MG: 160 SOLUTION ORAL at 06:37

## 2022-06-25 NOTE — PROGRESS NOTES
General Daily Progress Note    Admission Date: 6/23/2022  Hospital Day 3  Post-Op Day 2  Subjective:   Pain control could be better. Oxycodone was changed to Dilaudid yesterday, but patient has not taken any Dilaudid yet. Lidocaine infusion was discontinued last night because patient thought it might be causing throat swelling. That problem has resolved. She reports that she has not slept well, partly due to anxiety. She has not taken the hydroxyzine that is ordered from her PTA medications list, but it turns out that although she had that medicine at home for a few months she has never actually tried it. Feels somewhat bloated, but no nausea since yesterday morning. Burping. No flatus or bowel movement. Urine looked dark this morning. Has walked in the halls. Objective:   Patient Vitals for the past 24 hrs:   BP Temp Pulse Resp SpO2 Weight   06/25/22 0739 114/74 98.5 °F (36.9 °C) 88 16 99 % --   06/25/22 0700 -- -- -- -- -- 61.2 kg (134 lb 14.4 oz)   06/25/22 0012 105/65 98.3 °F (36.8 °C) 90 16 98 % --   06/24/22 1958 117/68 98.8 °F (37.1 °C) 100 16 99 % --   06/24/22 1500 97/63 99.2 °F (37.3 °C) 91 16 98 % --     No intake/output data recorded. 06/23 1901 - 06/25 0700  In: 100 [I.V.:100]  Out: 2585 [Urine:1750; Drains:585]      Physical Examination:  General Appearance - Mildly uncomfortable. Abdomen -  Somewhat distended. Incisions clean, dry, intact, and without erythema. GURWINDER drainage a bit less bloody. Extremities - Pneumatic compression stockings in use.              Data Review   Recent Results (from the past 24 hour(s))   CBC WITH AUTOMATED DIFF    Collection Time: 06/25/22  4:26 AM   Result Value Ref Range    WBC 3.8 3.6 - 11.0 K/uL    RBC 2.84 (L) 3.80 - 5.20 M/uL    HGB 8.9 (L) 11.5 - 16.0 g/dL    HCT 27.2 (L) 35.0 - 47.0 %    MCV 95.8 80.0 - 99.0 FL    MCH 31.3 26.0 - 34.0 PG    MCHC 32.7 30.0 - 36.5 g/dL    RDW 15.0 (H) 11.5 - 14.5 %    PLATELET 544 996 - 767 K/uL    MPV 9.7 8.9 - 12.9 FL    NRBC 0.0 0  WBC    ABSOLUTE NRBC 0.00 0.00 - 0.01 K/uL    NEUTROPHILS 68 32 - 75 %    LYMPHOCYTES 22 12 - 49 %    MONOCYTES 10 5 - 13 %    EOSINOPHILS 0 0 - 7 %    BASOPHILS 0 0 - 1 %    IMMATURE GRANULOCYTES 0 0.0 - 0.5 %    ABS. NEUTROPHILS 2.6 1.8 - 8.0 K/UL    ABS. LYMPHOCYTES 0.8 0.8 - 3.5 K/UL    ABS. MONOCYTES 0.4 0.0 - 1.0 K/UL    ABS. EOSINOPHILS 0.0 0.0 - 0.4 K/UL    ABS. BASOPHILS 0.0 0.0 - 0.1 K/UL    ABS. IMM. GRANS. 0.0 0.00 - 0.04 K/UL    DF AUTOMATED     METABOLIC PANEL, BASIC    Collection Time: 06/25/22  4:26 AM   Result Value Ref Range    Sodium 139 136 - 145 mmol/L    Potassium 3.6 3.5 - 5.1 mmol/L    Chloride 108 97 - 108 mmol/L    CO2 28 21 - 32 mmol/L    Anion gap 3 (L) 5 - 15 mmol/L    Glucose 97 65 - 100 mg/dL    BUN 6 6 - 20 MG/DL    Creatinine 0.67 0.55 - 1.02 MG/DL    BUN/Creatinine ratio 9 (L) 12 - 20      GFR est AA >60 >60 ml/min/1.73m2    GFR est non-AA >60 >60 ml/min/1.73m2    Calcium 8.3 (L) 8.5 - 10.1 MG/DL   PHOSPHORUS    Collection Time: 06/25/22  4:26 AM   Result Value Ref Range    Phosphorus 2.4 (L) 2.6 - 4.7 MG/DL           Assessment:     Principal Problem:    Rectal polyp (6/23/2022)    Active Problems:    Leiomyoma of uterus, unspecified (6/23/2022)      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 René King).     Hemodynamically stable. Hemoglobin has decreased further but is still acceptable.     Urine output seems to be borderline. I am not sure that all has been documented, but the patient reports that it looks dark. Creatinine within normal limits.     Still mildly hypophosphatemic. Awaiting return of GI function. Plan:   Begin low fiber diet with caution. Continue Ensure Surgery. Resume LR infusion at 50 mL/hr. Document urine output.     Replace phosphorus. Continue to hold Lovenox. Ambulate. Incentive spirometer. Melatonin as needed for insomnia.

## 2022-06-25 NOTE — PROGRESS NOTES
Bedside and Verbal shift change report given to Saima Orona  (oncoming nurse) by Prince Ga and Pebbles [place]chester  (offgoing nurse). Report included the following information SBAR.

## 2022-06-25 NOTE — OP NOTES
[address]   OPERATIVE REPORT    Name:  Emily Bean  MR#:  [id number]  :  1975  ACCOUNT #:  [de-identified]    DATE OF SERVICE:  2022    PREOPERATIVE DIAGNOSES:  1. Rectal neoplasm  2. Uterine fibroids    POSTOPERATIVE DIAGNOSES:  1. Rectal neoplasm  2. Uterine fibroids    PROCEDURES PERFORMED:  Hand-assisted laparoscopic low anterior resection, mobilization of the splenic flexure, and coloproctostomy. SURGEON:  Scotty Jung MD    ASSISTANT:  Alexi Garsia RN and Pablo Wood SA    ANESTHESIA:  General endotracheal    SPECIMENS REMOVED:  1. Rectum and sigmoid colon  2. Anastomotic donuts    TUBES AND DRAINS:  A 10-mm Blair-Chaparro drain in the pelvis    ESTIMATED BLOOD LOSS:  250 mL (for the combined low anterior resection and hysterectomy)    IV FLUIDS:  Crystalloid 2100 mL; albumin 500 mL    URINE OUTPUT:  115 mL    COMPLICATIONS:  None apparent    IMPLANTS:  None    INDICATIONS FOR PROCEDURE:  The patient is a 77-year-old female with large uterine fibroids and longstanding iron-deficiency anemia. She was scheduled to undergo a hysterectomy on 2022 to stop her heavy uterine bleeding. On 2022, she underwent an EGD and a colonoscopy. Both procedures were performed by Bianca Cohn MD.  The EGD revealed a small hiatal hernia, and biopsies of the esophagus, stomach, and duodenum were normal.  The colonoscopy revealed three diminutive polyps that were completely removed. Two of these polyps were located in the cecum, and one was in the transverse colon. It also revealed a non-obstructing 6 cm lateral spreading granular lesion in the rectosigmoid occupying approximately 50% of the circumference. The distal end of the lesion was noted to be located at approximately 9 cm from the anus. Biopsies were obtained and an [place] ink tattoo was placed just distal to the lesion.   The colonic polyps proved to have been tubular adenomas, and the biopsies of the rectosigmoid lesion were described as \"superficial fragments of tubulovillous adenoma. \"  Examination in the office on 06/03/2022 revealed grossly normal anal closure and no significant external perineal lesions. Digital rectal examination revealed grossly normal resting sphincter tone and squeezing strength and extrinsic compression of the rectum, but no intraluminal mass. Rigid proctosigmoidoscopy revealed the distal margin of the neoplasm at approximately 10 cm from the anal verge and a tattoo at approximately 8 cm from the anal verge. A CEA level obtained at [facility] on 06/03/2022 was within normal limits (1.5 ng/mL), and a CT scan of the abdomen and pelvis performed on 06/09/2022 was interpreted as revealing no evidence of metastatic disease. A low anterior resection was indicated for the treatment of the rectal polyp; however, because the hysterectomy was also still indicated and because performing the hysterectomy would facilitate the low anterior resection, the combination of these two procedures was recommended. The patient was seen in consultation by Hortensia Simms MD and he agreed to perform the hysterectomy. The risks of these surgical procedures were discussed in detail, and the patient agreed to proceed. OPERATIVE FINDINGS:  There was a benign-appearing rectal polyp. There was no evidence of metastatic disease. PROCEDURE:  After informed consent was obtained, the patient was taken to the operating room where standard monitoring devices were attached and adequate general endotracheal anesthesia was induced. She was then positioned in lithotomy with the lower extremities fitted with pneumatic compression stockings and supported by the padded hydraulic Chris stirrups. The perineum was prepped and draped in the usual sterile fashion and then Kelly Moreno MD performed cystoscopy and placement of bilateral ureteral catheters and a Herndon catheter.   The urologic procedure had been requested in order to facilitate intraoperative identification of the ureters and ,once it was completed, digital rectal examination was performed followed by the introduction of a 3-way Herndon catheter with a 30 mL balloon into the rectum. Through this catheter, the rectum and distal sigmoid were irrigated with several hundred milliliters of sterile water until the irrigant ran clear. A Betadine solution was then instilled and allowed to drain by gravity. The catheter was left in place until later in the case when its removal was required. The patient was repositioned, but still in lithotomy, with a gel pad placed beneath the sacrum and both upper extremities tucked. An orogastric tube was placed by the anesthetist.  500 mg of metronidazole and 2 g of cefazolin were administered parenterally. The patient had also undergone mechanical and antibiotic bowel preparation at home on the day before the procedure. The laparoscopic hysterectomy and bilateral salpingectomies were performed by Anthony Musa MD and his assistant. Trocars were placed in the supraumbilical midline, the left mid abdomen, and the right mid abdomen. The detached uterus and fallopian tubes were placed in an extraction bag and left inside the peritoneal cavity because they were too large to pass through the vagina and it was also thought to be too large to extract through what would be the GelPort incision without making it larger than would be desirable for the low anterior resection. 0.25% bupivacaine with epinephrine was infiltrated subcutaneously prior to making the skin incisions for the colorectal resection. The abdomen was then entered through a periumbilical midline incision that was begun where the 5-mm port from the laparoscopic hysterectomy was located and extended inferiorly for approximately 7 cm. The dissection was carried through the subcutaneous adipose tissues and the linea alba using the electrocautery.   The 5-mm port was removed and the GelPort was put in place. The GelPort would function as both wound retractor and wound protector. The 5-mm 30-degree laparoscope was introduced through one of the existing ports and the non-dominant hand was inserted through the GelPort. The abdomen was explored. The rectal mass was not palpable. Mobilization of the rectum was begun at the sacral promontory using the articulating Enseal introduced through a 12-mm port placed in the right lower quadrant. The visualization was mostly provided by the 5-mm, 30-degree laparoscope via a 5-mm suprapubic port. A window was created in the mesentery at the sacral promontory. Care was taken to repeatedly identify and avoid both ureters. The dissection was then carried superiorly to reach the inferior mesenteric artery, which was partially skeletonized and divided and ligated using the [place] Flex stapling device with a 60-mm white cartridge. The dissection was then carried inferiorly and circumferentially around the rectum deep into the pelvis. Once it seemed that sufficient inferior dissection had been performed, I went to the perineum and, after the rectal catheter had been removed, introduced the rigid proctosigmoidoscope transanally into the rectum. Using the scope, the distal margin of the polyp and also the location of the tattoo were reconfirmed. More importantly, it was determined that sufficient mobilization of the rectum had been performed to permit its division distal to the neoplasm. Returning to the abdomen, after changing gown and gloves, the preparation of the rectum for transection continued. The relatively thin portion of the mesorectum at this level was gradually divided using the Enseal.  The [place] Flex stapling device with a 60-mm blue cartridge was then used to divide the rectum below the area of the tattoo. Two firings were required.     The divided rectum and the distal sigmoid colon were then manipulated and additional mobilization was preformed including the division of the lateral peritoneal attachments and more of the mesentery. Ultimately, the inferior mesenteric vein was also divided. The splenic flexure required complete mobilization, which was somewhat difficult because the splenic flexure itself was retrosplenic. Carefully, it was freed without any apparent injury to the spleen. The omentum was dissected off of the colon in the distal transverse portion and the proximal descending portion. The left colon was gradually reflected medially and inferiorly, and eventually it seemed that sufficient mobilization had been performed to allow for the creation of a tension-free coloproctostomy. The specimen portion was then extracorporealized through the GelPort and a point was chosen for division, based on the blood supply and lymph node drainage. Here, the mesentery was divided using the electrocautery, the articulating Enseal, and some sharp division between clamps. The sharply divided structures were ligated with 2-0 silk ties and 2-0 silk sutures. In the specimen portion, a longitudinal colotomy was created. The suction was introduced to evacuate the contents from the proximal portion of the bowel. The EEA sizers were then brought onto the field and the #25 could be inserted without difficulty. The #28 fits very snugly and it was thought to be too large for the bowel to safely accommodate. The #25 [facility] stapling device was therefore selected. The anvil was brought onto the field, lubricated with Betadine, and inserted into the colonic wound. It was advanced retrograde, then the [place] Flex stapling device with a 60-mm cartridge was used to divide the bowel proximal to the site of the colotomy. This division of the bowel completed the resection of the specimen. The specimen was taken to the back table and opened revealing a gross margin of approximately 2 cm.   Returning to the abdomen once again after changing gown and gloves, the stapled end of the colon was inspected. The anvil shaft was brought out through a colotomy created using the electrocautery near the staple line. After some additional preparation of the bowel, the anvil was secured using a 2-0 Prolene pursestring suture around the shaft. The internal portion of the shaft was then irrigated with Betadine solution. The bowel was returned to the abdominal cavity and there was sufficient length for creation of a tension-free coloproctostomy. The #25 EEA sizer was introduced transanally into the rectum and advanced to the staple line of the rectal stump. Next, the stapler itself, the #25 [facility] was lubricated and inserted transanally. It was advanced to the staple line and the spike was brought near the midportion. The spike and the anvil shaft were engaged and the stapler was slowly closed, care was taken to avoid twisting of the bowel or incorporation of any extraneous tissues into the anastomosis. With the stapler completely closed, it was then fired, opened, and removed. Inspection of the anastomotic donuts revealed both to be intact. Leak testing of the anastomosis was performed using a new rigid proctosigmoidoscope that had not previously been in the bowel. The scope was used to inflate the bowel with air while gentle pressure was applied to occlude the bowel proximal to the coloproctostomy. Inspection over a period of minutes revealed acceptable distention of the bowel and no bubbling of air in the irrigant instilled into the pelvis. Leaking testing was repeated with the same results, and the anastomosis was therefore judged to be airtight. Inspection also revealed it to be apparently well vascularized. The pelvis was irrigated and the irrigant was mostly evacuated. The abdomen was carefully explored. There had been some accumulation of blood in the left upper quadrant while the patient was in Trendelenburg.   This blood was evacuated and study of that area revealed no significant mesenteric bleeding. With hemostasis having judged to be acceptable, a 10-mm Blair-Chaparro drain was placed in the pelvis and brought out through a left lower quadrant stab incision and then secured to the skin with a 3-0 nylon suture. Four small sheets of Seprafilm were placed on the viscera (with none on the coloproctostomy) and the GelPort was then removed. Per protocol, gowns and gloves were changed and the dedicated closing instrument set was opened. The patient was re-draped and the periumbilical midline fascial closure was performed using two running #1 PDS sutures. The subcutaneous portions of all three wounds were irrigated with saline. The subcutaneous adipose tissue in the periumbilical midline wound was reapproximated using 2-0 Vicryl sutures. Skin closure was performed using subcuticular 4-0 Monocryl sutures and Dermabond. Final inspection revealed acceptable hemostasis. At the conclusion of the procedure, the patient was extubated and transported in stable condition to the recovery room. The orogastric tube and the ureteral catheters had been removed, and the Herndon catheter had been left in place. There were no apparent complications, and the patient appeared to have tolerated the procedure well. All sponge, needle, and instrument counts were correct. Claudene Furry, MD      [id number]  D:  06/24/2022 23:59  T:  06/25/2022 8:48  JOB #:  [id number]  CC:   MD Alivia Perez MD Brenda Beer, MD Rebeca Shines, MD

## 2022-06-25 NOTE — PROGRESS NOTES
Bedside shift change report given to Yosef Garcia RN (oncoming nurse) by Mikala Perez RN (offgoing nurse). Report included the following information SBAR, Kardex, Intake/Output, MAR, Recent Results and Med Rec Status.

## 2022-06-26 LAB
ANION GAP SERPL CALC-SCNC: 6 MMOL/L (ref 5–15)
BASOPHILS # BLD: 0 K/UL (ref 0–0.1)
BASOPHILS NFR BLD: 1 % (ref 0–1)
BUN SERPL-MCNC: 11 MG/DL (ref 6–20)
BUN/CREAT SERPL: 10 (ref 12–20)
CALCIUM SERPL-MCNC: 8.4 MG/DL (ref 8.5–10.1)
CHLORIDE SERPL-SCNC: 110 MMOL/L (ref 97–108)
CO2 SERPL-SCNC: 25 MMOL/L (ref 21–32)
CREAT SERPL-MCNC: 1.12 MG/DL (ref 0.55–1.02)
DIFFERENTIAL METHOD BLD: ABNORMAL
EOSINOPHIL # BLD: 0 K/UL (ref 0–0.4)
EOSINOPHIL NFR BLD: 1 % (ref 0–7)
ERYTHROCYTE [DISTWIDTH] IN BLOOD BY AUTOMATED COUNT: 14.9 % (ref 11.5–14.5)
GLUCOSE SERPL-MCNC: 89 MG/DL (ref 65–100)
HCT VFR BLD AUTO: 26.6 % (ref 35–47)
HGB BLD-MCNC: 8.5 G/DL (ref 11.5–16)
IMM GRANULOCYTES # BLD AUTO: 0 K/UL (ref 0–0.04)
IMM GRANULOCYTES NFR BLD AUTO: 0 % (ref 0–0.5)
LYMPHOCYTES # BLD: 0.7 K/UL (ref 0.8–3.5)
LYMPHOCYTES NFR BLD: 17 % (ref 12–49)
MAGNESIUM SERPL-MCNC: 1.8 MG/DL (ref 1.6–2.4)
MCH RBC QN AUTO: 30.1 PG (ref 26–34)
MCHC RBC AUTO-ENTMCNC: 32 G/DL (ref 30–36.5)
MCV RBC AUTO: 94.3 FL (ref 80–99)
MONOCYTES # BLD: 0.4 K/UL (ref 0–1)
MONOCYTES NFR BLD: 10 % (ref 5–13)
NEUTS SEG # BLD: 3.1 K/UL (ref 1.8–8)
NEUTS SEG NFR BLD: 71 % (ref 32–75)
NRBC # BLD: 0 K/UL (ref 0–0.01)
NRBC BLD-RTO: 0 PER 100 WBC
PHOSPHATE SERPL-MCNC: 3.1 MG/DL (ref 2.6–4.7)
PLATELET # BLD AUTO: 187 K/UL (ref 150–400)
PMV BLD AUTO: 9.5 FL (ref 8.9–12.9)
POTASSIUM SERPL-SCNC: 3.8 MMOL/L (ref 3.5–5.1)
RBC # BLD AUTO: 2.82 M/UL (ref 3.8–5.2)
RBC MORPH BLD: ABNORMAL
SODIUM SERPL-SCNC: 141 MMOL/L (ref 136–145)
WBC # BLD AUTO: 4.2 K/UL (ref 3.6–11)

## 2022-06-26 PROCEDURE — 74011250637 HC RX REV CODE- 250/637: Performed by: COLON & RECTAL SURGERY

## 2022-06-26 PROCEDURE — 74011000250 HC RX REV CODE- 250: Performed by: COLON & RECTAL SURGERY

## 2022-06-26 PROCEDURE — 84100 ASSAY OF PHOSPHORUS: CPT

## 2022-06-26 PROCEDURE — 83735 ASSAY OF MAGNESIUM: CPT

## 2022-06-26 PROCEDURE — 65270000029 HC RM PRIVATE

## 2022-06-26 PROCEDURE — 80048 BASIC METABOLIC PNL TOTAL CA: CPT

## 2022-06-26 PROCEDURE — 85025 COMPLETE CBC W/AUTO DIFF WBC: CPT

## 2022-06-26 PROCEDURE — 36415 COLL VENOUS BLD VENIPUNCTURE: CPT

## 2022-06-26 RX ORDER — HYDROMORPHONE HYDROCHLORIDE 2 MG/1
2-4 TABLET ORAL
Status: DISCONTINUED | OUTPATIENT
Start: 2022-06-26 | End: 2022-06-27 | Stop reason: HOSPADM

## 2022-06-26 RX ADMIN — SIMETHICONE 80 MG: 80 TABLET, CHEWABLE ORAL at 07:01

## 2022-06-26 RX ADMIN — ACETAMINOPHEN 1000 MG: 160 SOLUTION ORAL at 10:29

## 2022-06-26 RX ADMIN — SIMETHICONE 80 MG: 80 TABLET, CHEWABLE ORAL at 17:11

## 2022-06-26 RX ADMIN — HYDROMORPHONE HYDROCHLORIDE 2 MG: 2 TABLET ORAL at 15:36

## 2022-06-26 RX ADMIN — HYDROMORPHONE HYDROCHLORIDE 2 MG: 2 TABLET ORAL at 10:30

## 2022-06-26 RX ADMIN — ACETAMINOPHEN 1000 MG: 160 SOLUTION ORAL at 23:07

## 2022-06-26 RX ADMIN — NALOXEGOL OXALATE 25 MG: 12.5 TABLET, FILM COATED ORAL at 06:50

## 2022-06-26 RX ADMIN — HYDROMORPHONE HYDROCHLORIDE 2 MG: 2 TABLET ORAL at 20:30

## 2022-06-26 RX ADMIN — ACETAMINOPHEN 1000 MG: 160 SOLUTION ORAL at 06:50

## 2022-06-26 RX ADMIN — HYDROMORPHONE HYDROCHLORIDE 2 MG: 2 TABLET ORAL at 01:20

## 2022-06-26 RX ADMIN — ACETAMINOPHEN 1000 MG: 160 SOLUTION ORAL at 17:16

## 2022-06-26 RX ADMIN — SODIUM CHLORIDE, PRESERVATIVE FREE 10 ML: 5 INJECTION INTRAVENOUS at 20:30

## 2022-06-26 RX ADMIN — HYDROMORPHONE HYDROCHLORIDE 2 MG: 2 TABLET ORAL at 05:15

## 2022-06-26 RX ADMIN — ACETAMINOPHEN 1000 MG: 160 SOLUTION ORAL at 01:20

## 2022-06-26 RX ADMIN — SODIUM CHLORIDE, PRESERVATIVE FREE 10 ML: 5 INJECTION INTRAVENOUS at 06:55

## 2022-06-26 NOTE — PROGRESS NOTES
General Daily Progress Note    Admission Date: 6/23/2022  Hospital Day 4  Post-Op Day 3  Subjective:   Pain control adequate. No nausea. Appetite low. Has passed flatus and some watery stool. Simethicone helped with gas. Has voided more than what has been documented. Objective:   Patient Vitals for the past 24 hrs:   BP Temp Pulse Resp SpO2   06/26/22 0837 110/69 98.2 °F (36.8 °C) 81 16 97 %   06/26/22 0126 119/74 98.2 °F (36.8 °C) 90 16 98 %   06/25/22 2136 111/73 97.8 °F (36.6 °C) 94 16 100 %   06/25/22 1522 118/76 98 °F (36.7 °C) 94 16 100 %     No intake/output data recorded. 06/24 1901 - 06/26 0700  In: -   Out: 12 [Urine:450; Drains:155]    Physical Examination:  General Appearance - No distress. Abdomen -  Somewhat distended. Incisions clean, dry, intact, and without erythema.  GURWINDER drainage serosanguinous. Extremities - Pneumatic compression stockings in use. No calf tenderness. Data Review   Recent Results (from the past 24 hour(s))   CBC WITH AUTOMATED DIFF    Collection Time: 06/26/22  5:15 AM   Result Value Ref Range    WBC 4.2 3.6 - 11.0 K/uL    RBC 2.82 (L) 3.80 - 5.20 M/uL    HGB 8.5 (L) 11.5 - 16.0 g/dL    HCT 26.6 (L) 35.0 - 47.0 %    MCV 94.3 80.0 - 99.0 FL    MCH 30.1 26.0 - 34.0 PG    MCHC 32.0 30.0 - 36.5 g/dL    RDW 14.9 (H) 11.5 - 14.5 %    PLATELET 280 095 - 818 K/uL    MPV 9.5 8.9 - 12.9 FL    NRBC 0.0 0  WBC    ABSOLUTE NRBC 0.00 0.00 - 0.01 K/uL    NEUTROPHILS 71 32 - 75 %    LYMPHOCYTES 17 12 - 49 %    MONOCYTES 10 5 - 13 %    EOSINOPHILS 1 0 - 7 %    BASOPHILS 1 0 - 1 %    IMMATURE GRANULOCYTES 0 0.0 - 0.5 %    ABS. NEUTROPHILS 3.1 1.8 - 8.0 K/UL    ABS. LYMPHOCYTES 0.7 (L) 0.8 - 3.5 K/UL    ABS. MONOCYTES 0.4 0.0 - 1.0 K/UL    ABS. EOSINOPHILS 0.0 0.0 - 0.4 K/UL    ABS. BASOPHILS 0.0 0.0 - 0.1 K/UL    ABS. IMM.  GRANS. 0.0 0.00 - 0.04 K/UL    DF SMEAR SCANNED      RBC COMMENTS ANISOCYTOSIS  1+       METABOLIC PANEL, BASIC    Collection Time: 06/26/22 5:15 AM   Result Value Ref Range    Sodium 141 136 - 145 mmol/L    Potassium 3.8 3.5 - 5.1 mmol/L    Chloride 110 (H) 97 - 108 mmol/L    CO2 25 21 - 32 mmol/L    Anion gap 6 5 - 15 mmol/L    Glucose 89 65 - 100 mg/dL    BUN 11 6 - 20 MG/DL    Creatinine 1.12 (H) 0.55 - 1.02 MG/DL    BUN/Creatinine ratio 10 (L) 12 - 20      GFR est AA >60 >60 ml/min/1.73m2    GFR est non-AA 52 (L) >60 ml/min/1.73m2    Calcium 8.4 (L) 8.5 - 10.1 MG/DL   MAGNESIUM    Collection Time: 06/26/22  5:15 AM   Result Value Ref Range    Magnesium 1.8 1.6 - 2.4 mg/dL   PHOSPHORUS    Collection Time: 06/26/22  5:15 AM   Result Value Ref Range    Phosphorus 3.1 2.6 - 4.7 MG/DL           Assessment:     Principal Problem:    Rectal polyp (6/23/2022)    Active Problems:    Leiomyoma of uterus, unspecified (6/23/2022)      Rectal neoplasm (6/23/2022)        3 Days Post-Op s/p hand-assisted laparoscopic low anterior resection, mobilization of the splenic flexure, and coloproctostomy (combined with laparoscopic hysterectomy and bilateral salpingectomy performed by Dr. Hazel Saenz).     Hemodynamically stable. Hemoglobin has continued to creep down, but it remains adequate.     Urine output remains borderline. Creatinine has increased a bit.     Hypophosphatemia corrected. GI function is beginning to return. Probably on track to go home tomorrow. Plan:   Continue low fiber diet. Continue Ensure Surgery.     Continue LR infusion at 50 mL/hr. Hold Celebrex.     Continue to hold Lovenox.     Ambulate. Incentive spirometer.

## 2022-06-27 VITALS
TEMPERATURE: 98.3 F | HEART RATE: 82 BPM | DIASTOLIC BLOOD PRESSURE: 68 MMHG | RESPIRATION RATE: 18 BRPM | WEIGHT: 134.9 LBS | OXYGEN SATURATION: 97 % | BODY MASS INDEX: 21.6 KG/M2 | SYSTOLIC BLOOD PRESSURE: 116 MMHG

## 2022-06-27 LAB
ANION GAP SERPL CALC-SCNC: 4 MMOL/L (ref 5–15)
BASOPHILS # BLD: 0 K/UL (ref 0–0.1)
BASOPHILS NFR BLD: 0 % (ref 0–1)
BUN SERPL-MCNC: 10 MG/DL (ref 6–20)
BUN/CREAT SERPL: 16 (ref 12–20)
CALCIUM SERPL-MCNC: 8.5 MG/DL (ref 8.5–10.1)
CHLORIDE SERPL-SCNC: 111 MMOL/L (ref 97–108)
CO2 SERPL-SCNC: 27 MMOL/L (ref 21–32)
CREAT SERPL-MCNC: 0.61 MG/DL (ref 0.55–1.02)
DIFFERENTIAL METHOD BLD: ABNORMAL
EOSINOPHIL # BLD: 0.1 K/UL (ref 0–0.4)
EOSINOPHIL NFR BLD: 3 % (ref 0–7)
ERYTHROCYTE [DISTWIDTH] IN BLOOD BY AUTOMATED COUNT: 14.8 % (ref 11.5–14.5)
GLUCOSE SERPL-MCNC: 90 MG/DL (ref 65–100)
HCT VFR BLD AUTO: 27.8 % (ref 35–47)
HGB BLD-MCNC: 9 G/DL (ref 11.5–16)
IMM GRANULOCYTES # BLD AUTO: 0 K/UL (ref 0–0.04)
IMM GRANULOCYTES NFR BLD AUTO: 0 % (ref 0–0.5)
LYMPHOCYTES # BLD: 1 K/UL (ref 0.8–3.5)
LYMPHOCYTES NFR BLD: 29 % (ref 12–49)
MCH RBC QN AUTO: 30.9 PG (ref 26–34)
MCHC RBC AUTO-ENTMCNC: 32.4 G/DL (ref 30–36.5)
MCV RBC AUTO: 95.5 FL (ref 80–99)
MONOCYTES # BLD: 0.4 K/UL (ref 0–1)
MONOCYTES NFR BLD: 10 % (ref 5–13)
NEUTS SEG # BLD: 2 K/UL (ref 1.8–8)
NEUTS SEG NFR BLD: 58 % (ref 32–75)
NRBC # BLD: 0 K/UL (ref 0–0.01)
NRBC BLD-RTO: 0 PER 100 WBC
PLATELET # BLD AUTO: 234 K/UL (ref 150–400)
PMV BLD AUTO: 9.2 FL (ref 8.9–12.9)
POTASSIUM SERPL-SCNC: 3.4 MMOL/L (ref 3.5–5.1)
RBC # BLD AUTO: 2.91 M/UL (ref 3.8–5.2)
SODIUM SERPL-SCNC: 142 MMOL/L (ref 136–145)
WBC # BLD AUTO: 3.6 K/UL (ref 3.6–11)

## 2022-06-27 PROCEDURE — 74011250637 HC RX REV CODE- 250/637: Performed by: COLON & RECTAL SURGERY

## 2022-06-27 PROCEDURE — 74011250636 HC RX REV CODE- 250/636: Performed by: COLON & RECTAL SURGERY

## 2022-06-27 PROCEDURE — 36415 COLL VENOUS BLD VENIPUNCTURE: CPT

## 2022-06-27 PROCEDURE — 74011000250 HC RX REV CODE- 250: Performed by: COLON & RECTAL SURGERY

## 2022-06-27 PROCEDURE — 80048 BASIC METABOLIC PNL TOTAL CA: CPT

## 2022-06-27 PROCEDURE — 85025 COMPLETE CBC W/AUTO DIFF WBC: CPT

## 2022-06-27 RX ORDER — ACETAMINOPHEN 500 MG/1
500 CAPSULE, LIQUID FILLED ORAL
Qty: 100 CAPSULE | Refills: 0 | Status: SHIPPED
Start: 2022-06-27

## 2022-06-27 RX ORDER — HYDROMORPHONE HYDROCHLORIDE 2 MG/1
2-4 TABLET ORAL
Qty: 30 TABLET | Refills: 0 | Status: SHIPPED | OUTPATIENT
Start: 2022-06-27 | End: 2022-07-04

## 2022-06-27 RX ADMIN — SODIUM CHLORIDE, POTASSIUM CHLORIDE, SODIUM LACTATE AND CALCIUM CHLORIDE 50 ML/HR: 600; 310; 30; 20 INJECTION, SOLUTION INTRAVENOUS at 05:52

## 2022-06-27 RX ADMIN — ACETAMINOPHEN 1000 MG: 160 SOLUTION ORAL at 05:48

## 2022-06-27 RX ADMIN — SIMETHICONE 80 MG: 80 TABLET, CHEWABLE ORAL at 12:43

## 2022-06-27 RX ADMIN — HYDROMORPHONE HYDROCHLORIDE 2 MG: 2 TABLET ORAL at 05:50

## 2022-06-27 RX ADMIN — HYDROMORPHONE HYDROCHLORIDE 2 MG: 2 TABLET ORAL at 10:11

## 2022-06-27 RX ADMIN — NALOXEGOL OXALATE 25 MG: 12.5 TABLET, FILM COATED ORAL at 06:40

## 2022-06-27 RX ADMIN — HYDROMORPHONE HYDROCHLORIDE 2 MG: 2 TABLET ORAL at 00:50

## 2022-06-27 RX ADMIN — SODIUM CHLORIDE, PRESERVATIVE FREE 10 ML: 5 INJECTION INTRAVENOUS at 05:50

## 2022-06-27 RX ADMIN — ACETAMINOPHEN 1000 MG: 160 SOLUTION ORAL at 10:10

## 2022-06-27 NOTE — DISCHARGE INSTRUCTIONS
[address] [facility] [address]  P [phone]  F [phone]     Kelley Chaparro      Dear Ms. Ana Mcdonough,      Please review your instructions with your nurse and ask any questions so you have all the information you need to recover well at home. If you do not feel you have everything you need to succeed and be safe after you leave the hospital, please discuss these concerns with your nurse. As always, call for any questions at home. Your doctor: Dr. Radha Torres  Diagnosis: Rectal neoplasm [D49.0]  Procedure: Procedure(s): HYSTERECTOMY TOTAL LAPAROSCOPIC, bilateral salpingectomy  Date of Discharge: Take Home Medications     See Discharge Medication Review provided to you by your nurse. If you did not receive one, request this prior to your discharge. · It is important that you take your medications as they are prescribed. · Keep your medications in the bottles provided by the pharmacist and keep a list of the medication names, dosages, times to be taken and what they are for in your wallet. · Do not take other medications without consulting your doctor. · Please do not take any laxatives without checking with Dr. Juan Parish. · Per Dr. Juan Parish, do not insert anything into the rectum. Diet  · Please see Dr. Becky Soto instructions regarding diet. Activity  · Showers are okay. No baths or swimming for 6-8 weeks. .  · No driving for 2 week and/or while on pain medication. .   · If you had surgery, the following restrictions are in place:  1. If you had a hysterectomy, nothing per vagina for 6-8 weeks. 2. If you had any type of vaginal procedure, you may experience vaginal spotting. Should the bleeding require more that 4 pads a day please call our office.     Causes For Concern    If any of the following occur, please call our office and speak with the Nurse/aid who will help you with your problem or ask the doctor to call you.  Problems with the incision, including increasing pain, swelling, redness or drainage.  Inability to pass urine    Increasing abdominal pain despite medication   Persisting nausea or vomiting.  Fever or chills and a temperature >101F   Constipation (no bowel movement for three days).  Diarrhea (more than three watery stools within 24 hours).  Excessive vaginal or wound bleeding.  If after hours and you cannot reach an on-call physician, call 911. Follow-Up    Call [phone] to schedule an appointment with Dr. Ronald Navarrete in 4 weeks. Information obtained by :  I understand that if any problems occur once I am at home I am to contact my physician. I understand and acknowledge receipt of the instructions indicated above. Physician's or R.N.'s Signature                                                                  Date/Time                                                                                                                                              Patient or Representative Signature                                                          Date/Time                Discharge Instructions from Dr. Chet Livingston      1. Do not lift any objects weighing more than 10 pounds for 4 weeks after the surgery date. 2. Do not do any housework including vacuuming, scrubbing or yardwork for 4 weeks after the surgery date. 3. Do not drive for 2 weeks after the surgery date or while taking sedating medications. 4. You should walk frequently and you should go up and down stairs as desired. 5. You may shower, but do not submerge your abdomen. Do not take tub baths, swim, or use hot tubs for the next 4 weeks. 6. You have surgical glue on your incisions. It will dissolve over time.   Do not scrub around the incisions or attempt to remove the glue. Keep a dry dressing on the drain site wound, changing it daily and as needed for wetness. You may shower with the dressing in place and then change it right away afterwards. 7. Continue the low fiber diet for the next 2 weeks. (See handbook for additional details). 8. Drink nutritional supplements 2 times per day until your diet and appetite are back to normal.     9. It can be normal to have multiple bowel movements each day. Contact your surgeons office at [phone] for any concerns. 10. Take maximum doses of Tylenol (1000 mg every 6 hours) until you do not need pain medication any more. You may take other pain medication (hydromorphone) as prescribed in addition to the Tylenol (but not in place of it) if you have already had as much Tylenol as you are allowed to have and your pain is not controlled well enough. You may also take simethicone (which is available over-the-counter) as needed for intestinal gas. 11. Follow up:  Please return to Dr. Keiht Harvey office at 11:00 AM on Friday 7/1/2022. If you need to change that appointment, please call [phone] on a weekday between 9:00 AM and noon. Please schedule follow-up with Dr. Ana Guerra for approximately one month from now. 12. If you experience fever (temperature greater than 100.5), chills, vomiting, redness around an incision, or infected-looking drainage from an incision, please contact your surgeons office at [phone]. 13. Please see handbook for additional instructions. If you have further questions, please call your surgeons office at [phone].

## 2022-06-27 NOTE — PROGRESS NOTES
Transition of Care:  home with f/u with specialists/pcp     Transport Plan: in car with family     RUR: 4%     DX: rectal neoplasm/scheduled procedure     Main contact is - Shanell Lopez- [phone]     Discharge pending:  -patient is POD#4 resection procedure with specialist    [id number]: this CM noted dc order; patient will be discharging home later today after diet tolerance with family and f/u with specialists; no other CM needs noted    CM following  Pako Concepcion RN, CRM       NOTE:patient is normally alert and oriented x 3;  patient and  live at stated address in [place], [place]; patient is normally independent in her ADLs; patients pcp is Dolly Pemberton MD and preferred pharmacy is the [facility] in [place]; patient has no history of home health services; insurance confirmed at [employer]

## 2022-06-27 NOTE — PROGRESS NOTES
General Daily Progress Note    Admission Date: 6/23/2022  Hospital Day 5  Post-Op Day 4  Subjective:   Good pain control. Just woke up and hasn't eaten yet, but feels mildly nauseated. Passing flatus and loose stool. Voiding well. Ambulating well. Objective:   Patient Vitals for the past 24 hrs:   BP Temp Pulse Resp SpO2   06/27/22 0805 116/68 98.3 °F (36.8 °C) 82 18 97 %   06/27/22 0052 105/67 98.4 °F (36.9 °C) 86 18 98 %   06/26/22 2030 120/66 98.1 °F (36.7 °C) 77 16 100 %   06/26/22 1529 116/88 98 °F (36.7 °C) 76 16 97 %   06/26/22 0837 110/69 98.2 °F (36.8 °C) 81 16 97 %     No intake/output data recorded. 06/25 1901 - 06/27 0700  In: -   Out: 8386 [Urine:1450; Drains:220]      Physical Examination:  General Appearance - No distress. Abdomen -  Somewhat distended.  Incisions clean, dry, intact, and without erythema.  GURWINDER drainage serosanguinous. Extremities - Pneumatic compression stockings in use.  No calf tenderness. Data Review   Recent Results (from the past 24 hour(s))   CBC WITH AUTOMATED DIFF    Collection Time: 06/27/22  5:48 AM   Result Value Ref Range    WBC 3.6 3.6 - 11.0 K/uL    RBC 2.91 (L) 3.80 - 5.20 M/uL    HGB 9.0 (L) 11.5 - 16.0 g/dL    HCT 27.8 (L) 35.0 - 47.0 %    MCV 95.5 80.0 - 99.0 FL    MCH 30.9 26.0 - 34.0 PG    MCHC 32.4 30.0 - 36.5 g/dL    RDW 14.8 (H) 11.5 - 14.5 %    PLATELET 780 446 - 655 K/uL    MPV 9.2 8.9 - 12.9 FL    NRBC 0.0 0  WBC    ABSOLUTE NRBC 0.00 0.00 - 0.01 K/uL    NEUTROPHILS 58 32 - 75 %    LYMPHOCYTES 29 12 - 49 %    MONOCYTES 10 5 - 13 %    EOSINOPHILS 3 0 - 7 %    BASOPHILS 0 0 - 1 %    IMMATURE GRANULOCYTES 0 0.0 - 0.5 %    ABS. NEUTROPHILS 2.0 1.8 - 8.0 K/UL    ABS. LYMPHOCYTES 1.0 0.8 - 3.5 K/UL    ABS. MONOCYTES 0.4 0.0 - 1.0 K/UL    ABS. EOSINOPHILS 0.1 0.0 - 0.4 K/UL    ABS. BASOPHILS 0.0 0.0 - 0.1 K/UL    ABS. IMM.  GRANS. 0.0 0.00 - 0.04 K/UL    DF AUTOMATED     METABOLIC PANEL, BASIC    Collection Time: 06/27/22  5:48 AM Result Value Ref Range    Sodium 142 136 - 145 mmol/L    Potassium 3.4 (L) 3.5 - 5.1 mmol/L    Chloride 111 (H) 97 - 108 mmol/L    CO2 27 21 - 32 mmol/L    Anion gap 4 (L) 5 - 15 mmol/L    Glucose 90 65 - 100 mg/dL    BUN 10 6 - 20 MG/DL    Creatinine 0.61 0.55 - 1.02 MG/DL    BUN/Creatinine ratio 16 12 - 20      GFR est AA >60 >60 ml/min/1.73m2    GFR est non-AA >60 >60 ml/min/1.73m2    Calcium 8.5 8.5 - 10.1 MG/DL           Assessment:     Principal Problem:    Rectal polyp (6/23/2022)    Active Problems:    Leiomyoma of uterus, unspecified (6/23/2022)      Rectal neoplasm (6/23/2022)        4 Days Post-Op s/p hand-assisted laparoscopic low anterior resection, mobilization of the splenic flexure, and coloproctostomy (combined with laparoscopic hysterectomy and bilateral salpingectomy performed by Dr. So Delgado).     Hemodynamically stable. Hemoglobin stable.   Urine output adequate. Creatinine within normal limits. No leukocytosis. GI function is returning. Pathology results are pending. The patient appears to be fit for discharge, but I would like to make sure she tolerates more meals. Plan:   Continue low fiber diet. Continue Ensure Surgery.     Discontinue IV fluids. Remove GURWINDER drain. Discharge to home this afternoon if no setback by then. Follow up with me on 7/1/2022. Follow up with Dr. So Delgado in approximately one month.

## 2022-06-27 NOTE — DISCHARGE SUMMARY
Discharge Summary     Patient ID:    Soheila Thorne  [id number]  32 y.o.  1975    Admission Date: 6/23/2022    Discharge Date: 6/27/2022    Admission Diagnoses:  1. Rectal neoplasm  2. Uterine fibroids  3. Chronic anemia      Chronic Diagnoses:    Patient Active Problem List   Diagnosis Code    Anemia D64.9    Depression F32. A    Rectal polyp K62.1    Leiomyoma of uterus, unspecified D25.9    Rectal neoplasm D49.0       Discharge Diagnoses:  1. 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 Uterine leiomyomata (resected)  3. Anemia (acute on chronic)  4. Hypophosphatemia (corrected)        Hospital Problems as of 6/27/2022 Date Reviewed: 6/20/2022          Codes Class Noted - Resolved POA    * (Principal) Rectal polyp (Chronic) ICD-10-CM: K62.1  ICD-9-CM: 569.0  6/23/2022 - Present Yes        Leiomyoma of uterus, unspecified (Chronic) ICD-10-CM: D25.9  ICD-9-CM: 218.9  6/23/2022 - Present Yes        Rectal neoplasm ICD-10-CM: D49.0  ICD-9-CM: 239.0  6/23/2022 - Present Unknown              Procedures Performed:  1. Cystoscopy and placement of bilateral ureteral catheters was performed by Sheree Quiroga MD on 6/23/2022.  2.  Total laparoscopic hysterectomy and bilateral salpingectomy was performed by Dwight Sotelo MD on 6/23/2022.  3.  Hand-assisted laparoscopic low anterior resection, mobilization of the splenic flexure, and coloproctostomy was performed by Dr. Joleen Stiles on 6/23/2022. Discharge Medications:   Current Discharge Medication List      START taking these medications    Details   acetaminophen (TYLENOL) 500 mg capsule Take 1 Capsule by mouth every four to six (4-6) hours as needed for Pain. Qty: 100 Capsule, Refills: 0      HYDROmorphone (DILAUDID) 2 mg tablet Take 1-2 Tablets by mouth every four (4) hours as needed for Pain (acute post-operative pain) for up to 7 days. Max Daily Amount: 24 mg.   Qty: 30 Tablet, Refills: 0 Associated Diagnoses: Acute post-operative pain         CONTINUE these medications which have NOT CHANGED    Details   cetirizine (ZYRTEC) 10 mg tablet Take 10 mg by mouth daily as needed. ondansetron (ZOFRAN ODT) 4 mg disintegrating tablet       multivitamin (ONE A DAY) tablet Take 2 Tablets by mouth daily. hydrOXYzine pamoate (VISTARIL) 25 mg capsule Take 1 Capsule by mouth two (2) times daily as needed for Anxiety. Qty: 60 Capsule, Refills: 1    Associated Diagnoses: Anxiety         STOP taking these medications       lactose-reduced food (ENSURE PO) Comments:   Reason for Stopping:         tranexamic acid (LYSTEDA) 650 mg tab tablet Comments:   Reason for Stopping:         OTHER Comments:   Reason for Stopping:                Diet:  Low fiber diet for two weeks. Activity:  No strenuous activity, driving, or lifting more than 10 lb. until two weeks after the operation. Frequent walking and stair climbing are encouraged. Wound Care:  Dry dressing to the drain site wound and change daily and as needed until the wound appears to have been dry and closed for at least 24 hours. Discharge Condition:  Improved compared to that upon admission. Disposition:  Home. Follow-up Care:  1. Dr. Manjinder Boston at 11:00 AM on 7/1/2022  2. Dr René King in approximately one month  3. Juan Manuel Butts MD in 1-2 weeks      Significant Diagnostic Studies:   Recent Labs     06/27/22  0548 06/26/22  0515   WBC 3.6 4.2   HGB 9.0* 8.5*   HCT 27.8* 26.6*    187     Recent Labs     06/27/22  0548 06/26/22  0515 06/25/22  0426    141 139   K 3.4* 3.8 3.6   * 110* 108   CO2 27 25 28   BUN 10 11 6   CREA 0.61 1.12* 0.67   GLU 90 89 97   CA 8.5 8.4* 8.3*   MG  --  1.8  --    PHOS  --  3.1 2.4*         HOSPITAL COURSE & TREATMENT RENDERED:     The patient is a 51-year-old female with large uterine fibroids and longstanding iron-deficiency anemia.   She was scheduled to undergo a hysterectomy on 06/26/2022 to stop her heavy uterine bleeding. On 05/20/2022, she underwent an EGD and a colonoscopy. Both procedures were performed by Dalia Perez MD.  The EGD revealed a small hiatal hernia, and biopsies of the esophagus, stomach, and duodenum were normal.  The colonoscopy revealed three diminutive polyps that were completely removed. Two of these polyps were located in the cecum, and one was in the transverse colon. It also revealed a non-obstructing 6 cm lateral spreading granular lesion in the rectosigmoid occupying approximately 50% of the circumference. The distal end of the lesion was noted to be located at approximately 9 cm from the anus. Biopsies were obtained and an [place] ink tattoo was placed just distal to the lesion. The colonic polyps proved to have been tubular adenomas, and the biopsies of the rectosigmoid lesion were described as \"superficial fragments of tubulovillous adenoma. \"  Examination in the office on 06/03/2022 revealed grossly normal anal closure and no significant external perineal lesions. Digital rectal examination revealed grossly normal resting sphincter tone and squeezing strength and extrinsic compression of the rectum, but no intraluminal mass. Rigid proctosigmoidoscopy revealed the distal margin of the neoplasm at approximately 10 cm from the anal verge and a tattoo at approximately 8 cm from the anal verge. A CEA level obtained at [facility] on 06/03/2022 was within normal limits (1.5 ng/mL), and a CT scan of the abdomen and pelvis performed on 06/09/2022 was interpreted as revealing no evidence of metastatic disease. A low anterior resection was indicated for the treatment of the rectal polyp; however, because the hysterectomy was also still indicated and because performing the hysterectomy would facilitate the low anterior resection, the combination of these two procedures was recommended.   The patient was seen in consultation by Saud Montano MD and he agreed to perform the hysterectomy. The risks of these surgical procedures were discussed in detail, and the patient agreed to proceed. She was taken to the operating room on 6/23/2022, and there the above-listed procedures were performed without apparent complications. Post-operatively, she was transported in stable condition from the recovery room to the floor. Her hospital course was essentially unremarkable. She experienced a gradual return of bowel function, and her diet was advanced appropriately. On POD#4, she was hemodynamically stable and afebrile, tolerating a low fiber diet, passing flatus and stool, and ambulating well. There was no physical or laboratory evidence of infection or other complication, and at this point she was judged to be fit for discharge. Her condition upon discharge was improved compared to that upon admission, and she appeared to have understood all discharge and follow-up instructions.         Signed:  Eliane Browning MD

## 2022-07-20 ENCOUNTER — HOSPITAL ENCOUNTER (OUTPATIENT)
Dept: CT IMAGING | Age: 47
Discharge: HOME | End: 2022-07-20
Attending: COLON & RECTAL SURGERY
Payer: MEDICAID

## 2022-07-20 ENCOUNTER — TRANSCRIBE ORDER (OUTPATIENT)
Dept: SCHEDULING | Age: 47
End: 2022-07-20

## 2022-07-20 DIAGNOSIS — R10.9 PAIN, ABDOMINAL: Primary | ICD-10-CM

## 2022-07-20 DIAGNOSIS — R10.9 PAIN, ABDOMINAL: ICD-10-CM

## 2022-07-20 PROCEDURE — 74011000636 HC RX REV CODE- 636

## 2022-07-20 PROCEDURE — 74177 CT ABD & PELVIS W/CONTRAST: CPT

## 2022-07-20 RX ADMIN — IOPAMIDOL 100 ML: 755 INJECTION, SOLUTION INTRAVENOUS at 18:00

## 2022-07-21 ENCOUNTER — OFFICE VISIT (OUTPATIENT)
Dept: GYNECOLOGY | Age: 47
End: 2022-07-21
Payer: MEDICAID

## 2022-07-21 VITALS
SYSTOLIC BLOOD PRESSURE: 101 MMHG | HEIGHT: 66 IN | HEART RATE: 89 BPM | BODY MASS INDEX: 21.21 KG/M2 | DIASTOLIC BLOOD PRESSURE: 67 MMHG | WEIGHT: 132 LBS

## 2022-07-21 DIAGNOSIS — D50.0 IRON DEFICIENCY ANEMIA DUE TO CHRONIC BLOOD LOSS: ICD-10-CM

## 2022-07-21 DIAGNOSIS — N92.0 MENORRHAGIA WITH REGULAR CYCLE: ICD-10-CM

## 2022-07-21 DIAGNOSIS — D25.9 UTERINE LEIOMYOMA, UNSPECIFIED LOCATION: Primary | ICD-10-CM

## 2022-07-21 PROCEDURE — 99024 POSTOP FOLLOW-UP VISIT: CPT | Performed by: OBSTETRICS & GYNECOLOGY

## 2022-07-21 RX ORDER — PANTOPRAZOLE SODIUM 40 MG/1
40 TABLET, DELAYED RELEASE ORAL DAILY
COMMUNITY
End: 2022-11-02

## 2022-07-21 NOTE — PROGRESS NOTES
[facility] [address], [address]  P [phone]  F [phone]    Postoperative Office Note  Patient ID:  Name: Manuelito Miller  MRM: [id number]  : 1975/46 y.o. Date: 2022    Diagnosis:     ICD-10-CM ICD-9-CM    1. Uterine leiomyoma, unspecified location  D25.9 218.9       2. Menorrhagia with regular cycle  N92.0 626.2       3. Iron deficiency anemia due to chronic blood loss  D50.0 280.0           Problem List:   Patient Active Problem List   Diagnosis Code    Anemia D64.9    Depression F32. A    Rectal polyp K62.1    Leiomyoma of uterus, unspecified D25.9    Rectal neoplasm D49.0       SUBJECTIVE:    Manuelito Miller is a 55 y.o. female who presents for followup postoperative care following TLH, performed at the same time as a rectal resection by Dr. Jose Winston. Operative findings:  Enlarged fibroid uterus (~15 week). Normal tubes and ovaries bilaterally. The patient's pathology revealed: FINAL PATHOLOGIC DIAGNOSIS   1. Uterus, cervix and bilateral fallopian tubes; total hysterectomy and lateral salpingectomy:   Uterus and cervix:   Benign weakly proliferative-type endometrium. Benign myometrium with adenomyosis and intramural and subserosal leiomyomata (up to 5.5 cm in greatest dimension) with ischemic changes and hyaline degeneration. Benign ectocervical and endocervical mucosa. Bilateral fallopian tubes:   Bilateral benign fallopian tubes with no significant histopathologic abnormality. 2. 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 10 benign lymph nodes (0/10). Focal serosal endosalpingosis and calcifications. Surgical resection margins are viable and negative for dysplasia. 3. Colon, anastomotic donuts; resection:   Portion of benign colonic wall of anastomotic donuts; no dysplasia identified.      She had a CT scan done yesterday to evaluate some pain. This was ordered by Dr. Floyd Tamez. She reports back pain after she eats. Medications:     Current Outpatient Medications on File Prior to Visit   Medication Sig Dispense Refill    pantoprazole (PROTONIX) 40 mg tablet Take 40 mg by mouth in the morning. Two tablets daily. acetaminophen (TYLENOL) 500 mg capsule Take 1 Capsule by mouth every four to six (4-6) hours as needed for Pain. 100 Capsule 0    ondansetron (ZOFRAN ODT) 4 mg disintegrating tablet       multivitamin (ONE A DAY) tablet Take 2 Tablets by mouth in the morning. cetirizine (ZYRTEC) 10 mg tablet Take 10 mg by mouth daily as needed. hydrOXYzine pamoate (VISTARIL) 25 mg capsule Take 1 Capsule by mouth two (2) times daily as needed for Anxiety.  (Patient not taking: No sig reported) 60 Capsule 1     Current Facility-Administered Medications on File Prior to Visit   Medication Dose Route Frequency Provider Last Rate Last Admin    [COMPLETED] iopamidoL (ISOVUE-370) 76 % injection        100 mL at 07/20/22 1800     Allergies:   No Known Allergies  Past Medical History:   Diagnosis Date    Anemia     Anxiety and depression     anxity uses meds    COVID-19 vaccine series completed     Pfizer dose #1 received on 7/9/2021; dose of #2 received on 7/30/2021; dose #3 received on 1/19/2022    Depression     self treats with essential oils    History of colonic polyps     Leiomyoma of uterus     Other ill-defined conditions(799.89)     chronic hives    Vaginal delivery     Twice     Past Surgical History:   Procedure Laterality Date    COLONOSCOPY N/A 5/20/2022    Dr. Francisco Hernandez ENDOSCOPY  05/20/2022    EGD; Dr. Francisco Hernandez GYN      cerclage x2    HX GYN      Cervical conization    HX HEENT  02/2018    4 teeth removed      Social History     Socioeconomic History    Marital status:      Spouse name: Not on file    Number of children: Not on file    Years of education: Not on file Highest education level: Not on file   Occupational History    Not on file   Tobacco Use    Smoking status: Never    Smokeless tobacco: Never   Vaping Use    Vaping Use: Never used   Substance and Sexual Activity    Alcohol use: Yes     Comment: rarely    Drug use: No    Sexual activity: Yes     Partners: Male   Other Topics Concern    Not on file   Social History Narrative    Not on file     Social Determinants of Health     Financial Resource Strain: Not on file   Food Insecurity: Not on file   Transportation Needs: Not on file   Physical Activity: Not on file   Stress: Not on file   Social Connections: Not on file   Intimate Partner Violence: Not on file   Housing Stability: Not on file       OBJECTIVE:    Vitals:   Vitals:    07/21/22 0845   BP: 101/67   Pulse: 89   Weight: 132 lb (59.9 kg)   Height: 5' 6.26\" (1.683 m)     Physical Examination:     General:  alert, cooperative, no distress   Lungs: lungs clear to auscultation, no accessory muscle use   Cardiac: Regular rate and rhythm   Abdomen: soft, bowel sounds active, non-tender  No CVA tenderness   Incision: healing well   Pelvic: Normal external genitalia. Normal vagina. Cuff intact. Uterus and cervix surgically absent. Ovaries not palpable. Rectal: not done   Extremity:   extremities normal, atraumatic, no cyanosis or edema       CT of abdomen/pelvis (7/20/22)  LUNG BASES: Clear. INCIDENTALLY IMAGED HEART AND MEDIASTINUM: Unremarkable. LIVER: No mass or fatty infiltration. GALLBLADDER: Unremarkable. SPLEEN: Not enlarged. PANCREAS: No mass or inflammation  ADRENALS: Unremarkable. KIDNEYS: No mass, calculus, or hydronephrosis. BOWEL: No evidence of obstruction. Post low anterior resection resection  APPENDIX: Not identified  PERITONEUM: There is a small fluid collection in the deep pelvis best seen on  series 3 image 55 measuring 3.5 x 2.5 x 3.5 cm.  Given the location of bowel and  surrounding bone percutaneous sampling or drainage would be difficult. Inflammation of the soft tissues anterior to the sacrum  RETROPERITONEUM: No lymphadenopathy or aortic aneurysm. BLADDER: No wall thickening or stone  REPRODUCTIVE ORGANS: Surgically absent  BONES: No destructive bone lesion. ADDITIONAL COMMENTS: N/A     IMPRESSION  1. Post low anterior resection. Small fluid collection deep within the pelvis. May represent postoperative seroma but abscess cannot be excluded. Given  adjacent bowel and bone percutaneous sampling or drainage would be difficult      IMPRESSION:    Lisandro Modi is doing well postoperatively. She has no evidence of malignancy and no obvious complications from surgery. I reviewed the CT scan. There is a small fluid collection, posterior to the colon, above the level of the anastomosis, and well distant from the vaginal cuff. I suspect this is a postop seroma/hematoma. It is possible that this could be related to one of her ovaries. Medical problems:     Patient Active Problem List   Diagnosis Code    Anemia D64.9    Depression F32. A    Rectal polyp K62.1    Leiomyoma of uterus, unspecified D25.9    Rectal neoplasm D49.0       PLAN:    The operative procedures and clinical results have been reviewed with the patient. Implications of diagnosis discussed at length. All questions answered. I do not recommend any intervention for the fluid collection, other than observation and follow-up. If she were to develop fevers or leukocytosis, I would recommend percutaneous drainage. The patient may return to all of her normal activities without restrictions. I will see the patient back prn. The patient is advised to call our office with any problems or concerns. An electronic signature was used to sign this note.     Nemesio Miramontes MD  7/21/2022

## 2022-09-28 ENCOUNTER — VIRTUAL VISIT (OUTPATIENT)
Dept: FAMILY MEDICINE CLINIC | Age: 47
End: 2022-09-28
Payer: MEDICAID

## 2022-09-28 DIAGNOSIS — G51.0 LEFT-SIDED BELL'S PALSY: Primary | ICD-10-CM

## 2022-09-28 DIAGNOSIS — D50.0 IRON DEFICIENCY ANEMIA DUE TO CHRONIC BLOOD LOSS: ICD-10-CM

## 2022-09-28 PROCEDURE — 99214 OFFICE O/P EST MOD 30 MIN: CPT | Performed by: FAMILY MEDICINE

## 2022-09-28 NOTE — PROGRESS NOTES
1. Have you been to the ER, urgent care clinic since your last visit? Hospitalized since your last visit? No    2. Have you seen or consulted any other health care providers outside of the [address] [place] [address] since your last visit? Include any pap smears or colon screening. No    Health Maintenance Due   Topic Date Due    Hepatitis C Screening  Never done    DTaP/Tdap/Td series (1 - Tdap) Never done    Lipid Screen  Never done    Flu Vaccine (1) Never done     Chief Complaint   Patient presents with    Facial Droop     Facial issues that started last week, started with some twitching and eye jumping. Pt states she was diagnosed with [place] Palsy a couple of years ago.

## 2022-09-28 NOTE — PROGRESS NOTES
Chief Complaint   Patient presents with    Facial Droop     Facial issues that started last week, started with some twitching and eye jumping. Pt states she was diagnosed with [place] Palsy a couple of years ago. Pt was seen via virtual video visit. Pt reports that she has had [place] Palsy in the past, noted eye twitching last week. She then developed palsy of her left eye and mouth. Pt reports that she has had the palsy for about a week. Seems better when resting, worse with movement. Pt was on prednisone in the past, did not tolerate medication well, gave her panic attacks. Pt need follow up labs to check anemia. Lennox Noa is a 55 y.o. female who was seen by synchronous (real-time) audio-video technology on 9/28/2022 for Facial Droop (Facial issues that started last week, started with some twitching and eye jumping. Pt states she was diagnosed with [place] Palsy a couple of years ago.)        Assessment & Plan:   Diagnoses and all orders for this visit:    1. Left-sided Bell's palsy  -reviewed course of illness, advised pt of eye treatment and precautions  -pt declined prednisone at this time  -has neuro follow up    2. Iron deficiency anemia due to chronic blood loss  -     CBC WITH AUTOMATED DIFF; Future  -     IRON PROFILE; Future  -     FERRITIN; Future    Lab appt scheduled for tomorrow       Subjective:       Prior to Admission medications    Medication Sig Start Date End Date Taking? Authorizing Provider   acetaminophen (TYLENOL) 500 mg capsule Take 1 Capsule by mouth every four to six (4-6) hours as needed for Pain. 6/27/22  Yes Edwin Feliz MD   ondansetron (ZOFRAN ODT) 4 mg disintegrating tablet  6/22/22  Yes Provider, Historical   multivitamin (ONE A DAY) tablet Take 2 Tablets by mouth in the morning. Yes Provider, Historical   cetirizine (ZYRTEC) 10 mg tablet Take 10 mg by mouth daily as needed.    Yes Other, MD Gavin   pantoprazole (PROTONIX) 40 mg tablet Take 40 mg by mouth in the morning. Two tablets daily. Patient not taking: Reported on 9/28/2022    Provider, Historical   hydrOXYzine pamoate (VISTARIL) 25 mg capsule Take 1 Capsule by mouth two (2) times daily as needed for Anxiety. Patient not taking: No sig reported 1/25/22   Patrick Butts MD     No Known Allergies    Review of Systems   Neurological:  Positive for focal weakness and weakness.      Objective:     Patient-Reported Vitals 9/28/2022   Patient-Reported Weight -   Patient-Reported Height -   Patient-Reported Temperature -   Patient-Reported Systolic  722   Patient-Reported Diastolic 72   Patient-Reported LMP -        [INSTRUCTIONS:  \"[x]\" Indicates a positive item  \"[]\" Indicates a negative item  -- DELETE ALL ITEMS NOT EXAMINED]    Constitutional: [x] Appears well-developed and well-nourished [x] No apparent distress      [] Abnormal -     Mental status: [x] Alert and awake  [x] Oriented to person/place/time [x] Able to follow commands    [] Abnormal -     Eyes:   EOM    []  Normal    [] Abnormal -   Sclera  [x]  Normal    [] Abnormal -          Discharge [x]  None visible   [] Abnormal -   Left eye unable to completely close    HENT: [x] Normocephalic, atraumatic  [] Abnormal -   [x] Mouth/Throat: Mucous membranes are moist  Dropping of left corner of her mouth    External Ears [x] Normal  [] Abnormal -    Neck: [x] No visualized mass [] Abnormal -     Pulmonary/Chest: [x] Respiratory effort normal   [x] No visualized signs of difficulty breathing or respiratory distress        [] Abnormal -      Musculoskeletal:   [x] Normal gait with no signs of ataxia         [x] Normal range of motion of neck        [] Abnormal -     Neurological:        [x] No Facial Asymmetry (Cranial nerve 7 motor function) (limited exam due to video visit)          [x] No gaze palsy        [] Abnormal -          Skin:        [x] No significant exanthematous lesions or discoloration noted on facial skin         [] Abnormal -            Psychiatric:       [x] Normal Affect [] Abnormal -        [x] No Hallucinations    Other pertinent observable physical exam findings:-        We discussed the expected course, resolution and complications of the diagnosis(es) in detail. Medication risks, benefits, costs, interactions, and alternatives were discussed as indicated. I advised her to contact the office if her condition worsens, changes or fails to improve as anticipated. She expressed understanding with the diagnosis(es) and plan. Jose Serra, was evaluated through a synchronous (real-time) audio-video encounter. The patient (or guardian if applicable) is aware that this is a billable service, which includes applicable co-pays. This Virtual Visit was conducted with patient's (and/or legal guardian's) consent. The visit was conducted pursuant to the emergency declaration under the [address] [place] [address], [address] [place] [address] authority and the [employer] General Act. Patient identification was verified, and a caregiver was present when appropriate. The patient was located at: Home: [address]  The provider was located at:  Facility (Appt Department): [facility]  Leland Duffy MD

## 2022-09-29 ENCOUNTER — LAB ONLY (OUTPATIENT)
Dept: FAMILY MEDICINE CLINIC | Age: 47
End: 2022-09-29

## 2022-09-29 DIAGNOSIS — D50.9 IRON DEFICIENCY ANEMIA, UNSPECIFIED IRON DEFICIENCY ANEMIA TYPE: ICD-10-CM

## 2022-10-01 LAB
BASOPHILS # BLD AUTO: 0 X10E3/UL (ref 0–0.2)
BASOPHILS NFR BLD AUTO: 0 %
EOSINOPHIL # BLD AUTO: 0.1 X10E3/UL (ref 0–0.4)
EOSINOPHIL NFR BLD AUTO: 3 %
ERYTHROCYTE [DISTWIDTH] IN BLOOD BY AUTOMATED COUNT: 13.8 % (ref 11.7–15.4)
FERRITIN SERPL-MCNC: 11 NG/ML (ref 15–150)
HCT VFR BLD AUTO: 36.2 % (ref 34–46.6)
HGB BLD-MCNC: 11.2 G/DL (ref 11.1–15.9)
IMM GRANULOCYTES # BLD AUTO: 0 X10E3/UL (ref 0–0.1)
IMM GRANULOCYTES NFR BLD AUTO: 0 %
IRON SATN MFR SERPL: 37 % (ref 15–55)
IRON SERPL-MCNC: 126 UG/DL (ref 27–159)
LYMPHOCYTES # BLD AUTO: 1.3 X10E3/UL (ref 0.7–3.1)
LYMPHOCYTES NFR BLD AUTO: 48 %
MCH RBC QN AUTO: 27.5 PG (ref 26.6–33)
MCHC RBC AUTO-ENTMCNC: 30.9 G/DL (ref 31.5–35.7)
MCV RBC AUTO: 89 FL (ref 79–97)
MONOCYTES # BLD AUTO: 0.4 X10E3/UL (ref 0.1–0.9)
MONOCYTES NFR BLD AUTO: 13 %
MORPHOLOGY BLD-IMP: ABNORMAL
NEUTROPHILS # BLD AUTO: 1 X10E3/UL (ref 1.4–7)
NEUTROPHILS NFR BLD AUTO: 36 %
PLATELET # BLD AUTO: 292 X10E3/UL (ref 150–450)
RBC # BLD AUTO: 4.07 X10E6/UL (ref 3.77–5.28)
TIBC SERPL-MCNC: 343 UG/DL (ref 250–450)
UIBC SERPL-MCNC: 217 UG/DL (ref 131–425)
WBC # BLD AUTO: 2.8 X10E3/UL (ref 3.4–10.8)

## 2022-11-01 NOTE — PROGRESS NOTES
Neurology Note    Patient ID:  Velma Roberts  [id number]  55 y.o.  1975      Date of Consultation:  November 2, 2022    Referring Physician: Dr. Liliane Ramon    Reason for Consultation:   neurological symptoms. Assessment and Plan:    The patient is a pleasant 66-year-old female who presents to the neurology clinic at [facility] for an evaluation of sensory disturbance which his intermittent in her upper and lower extremities. She also did have a recent Bell's palsy on the left and intermittent twitching of her right eye. Her examination was only notable today for a bilateral distal median sensory neuropathy. Disturbance of skin sensation:  Her symptoms have improved. She did have essentially normal neurological examination except for the carpal tunnel syndrome. Given the diffuse nature of her symptoms, this would suggest a metabolic or systemic illness causing her symptoms. She has had iron deficiency as well as vitamin D deficiency in the past which could have been contributing. Anxiety and stress may also be contributing to some of her symptoms. She was reassured that her neurological examination was normal today. Given this, I will hold off on any neuroimaging at this time. I will check an updated vitamin D level as it has been greater than 1 year. I will also check a serum immunofixation given the symptoms as well as a mildly low white blood cell count. (This is being followed by her primary care doctor)  if symptoms do worsen or become persistent, she will contact the office and we will consider additional testing at this time. Carpal tunnel syndrome:  She did have prior EMG/nerve conduction study which revealed mild bilateral carpal tunnel syndrome. This was noted clinically on examination today. I did discuss with her about the importance of wearing splint and looking at lifestyle modifications.   If symptoms worsen, I did recommend that she follow back up with [facility]. Bell's palsy:  She has had 2 bouts of Bell's palsy with complete resolution of her symptoms. I will follow this clinically for the time being as her neurological examination is normal.  Further testing depending if there is a recurrence. Fasiculations:   None present today. Most likely benign. Discussed importance of staying well hydrated, minimizing caffeine use, and getting adequate sleep and nutrition. Will continue to follow. Subjective: I had numbness       History of Present Illness:   Joleen Thornton is a 55 y.o. female who was referred to the neurology clinic at [facility] for an evaluation. She presents with her  today who provides additional information. The patient reported that in early May of this year she began to notice increasing numbness in her hands bilaterally. She was seen by [address] [place] [address] who performed an EMG/nerve conduction study which I was able to review. Did reveal that there was mild carpal tunnel syndrome. She then began to notice numbness in her left calf and foot. The symptoms were intermittent in her hands as well as her calf. There is no weakness that was associated with this. There was no pain. It is unclear as to the exact etiology of this and what may have triggered this. She did state that the symptoms though have completely resolved. She still does have some mild numbness and tingling in the right hand associated with her carpal tunnel syndrome. In regards to her carpal tunnel symptoms she did have injections in the past.        Other symptoms that have occurred is that the patient did have a bout of Bell's palsy approximately 1 month ago. She was not given steroids or antiviral medications. She reports she had a Bell's palsy in 2017 at that time she did take steroids which cause a significant reaction to her notes why they held off on doing steroids this time.   Her Bell's palsy was much milder. She did not have complete facial weakness and the symptoms did resolve. She did not have any notable dryness of her eyes and does not need to sleep with an eye patch. She was able to continue to speak and swallow during that time. There was no viral illness that she was aware prior to this Bell's palsy. She did have a rather eventful summer including having a partial bowel resection as well as a hysterectomy that occurred in late June 2022. She did have an ulcer afterwards but that has also resolved. Other symptoms that she did want to mention is that she did have left shoulder pain and discomfort which was worse with certain movements. This has not now resolved. Previously, this would bring her to tears but not so now that she can perform all of her day-to-day activities. Lastly, she has noticed twitching in her right eye recently. This is intermittent she is unsure of any specific triggers that may be associated with it. She states that today it has not been noticeable. She states that she has had low iron levels in the past and needed supplementation but her levels are continue to be followed by GI and levels have improved.       Past Medical History:   Diagnosis Date    Anemia     Anxiety and depression     anxity uses meds    COVID-19 vaccine series completed     Pfizer dose #1 received on 7/9/2021; dose of #2 received on 7/30/2021; dose #3 received on 1/19/2022    Depression     self treats with essential oils    History of colonic polyps     Leiomyoma of uterus     Other ill-defined conditions(799.89)     chronic hives    Vaginal delivery     Twice        Past Surgical History:   Procedure Laterality Date    COLONOSCOPY N/A 05/20/2022    Dr. Livia Higuera ENDOSCOPY  05/20/2022    EGD; Dr. Livia Higuera GYN      cerclage x2    HX GYN      Cervical conization    HX HEENT  02/2018    4 teeth removed     HX HYSTERECTOMY  06/23/2022        Family History   Problem Relation Age of Onset    Cancer Mother         uterine    Diabetes Mother     Alzheimer's Disease Father     Dementia Father     Hypertension Sister     Hypertension Brother     Diabetes Brother     Breast Cancer Maternal Aunt     Breast Cancer Maternal Uncle     Hypertension Sister     Hypertension Brother         Social History     Tobacco Use    Smoking status: Never    Smokeless tobacco: Never   Substance Use Topics    Alcohol use: Yes     Comment: rarely        No Known Allergies     Prior to Admission medications    Medication Sig Start Date End Date Taking? Authorizing Provider   acetaminophen (TYLENOL) 500 mg capsule Take 1 Capsule by mouth every four to six (4-6) hours as needed for Pain. 6/27/22  Yes Asa Alejandre MD   ondansetron (ZOFRAN ODT) 4 mg disintegrating tablet  6/22/22  Yes Provider, Historical   multivitamin (ONE A DAY) tablet Take 2 Tablets by mouth in the morning. Yes Provider, Historical   cetirizine (ZYRTEC) 10 mg tablet Take 10 mg by mouth daily as needed. Yes Other, MD Gavin   pantoprazole (PROTONIX) 40 mg tablet Take 40 mg by mouth in the morning. Two tablets daily. Patient not taking: No sig reported    Provider, Historical   hydrOXYzine pamoate (VISTARIL) 25 mg capsule Take 1 Capsule by mouth two (2) times daily as needed for Anxiety.   Patient not taking: No sig reported 1/25/22   Nicki Butts MD       Review of Systems:    General, constitutional: weight change  Eyes, vision: negative  Ears, nose, throat: negative  Cardiovascular, heart: negative  Respiratory: negative  Gastrointestinal: constipation  Genitourinary: negative  Musculoskeletal: joint pain  Skin and integumentary: negative  Psychiatric: negative  Endocrine: negative  Neurological: negative, except for HPI  Hematologic/lymphatic: negative  Allergy/immunology: negative      Objective:     Visit Vitals  /70 (BP 1 Location: Left arm, BP Patient Position: Sitting, BP Cuff Size: Adult)   Pulse 76   Resp 16   Ht 5' 4\" (1.626 m)   Wt 130 lb (59 kg)   SpO2 100%   BMI 22.31 kg/m²       Physical Exam:      General:  appears well nourished in no acute distress  Neck: no carotid bruits  Lungs: clear to auscultation  Heart:  no murmurs, regular rate  Lower extremity: peripheral pulses palpable and no edema  Skin: intact    Neurological exam:    Awake, alert, oriented to person, place and time  Recent and remote memory were normal  Attention and concentration were intact  Language was intact. There was no aphasia  Speech: no dysarthria  Fund of knowledge was preserved    Cranial nerves:   II-XII were tested    Perrrla  Fundoscopic examination revealed venous pulsations and no clear abnormalities  Visual fields were full  Eomi, no evidence of nystagmus  Facial sensation:  normal and symmetric  Facial motor: normal and symmetric  Hearing intact  SCM strength intact  Tongue: midline without fasciculations    Motor: Tone normal  Pronator drift was absent  There was full range of motion in her left shoulder with no difficulties  No evidence of fasciculations    Strength testing:   deltoid triceps biceps Wrist ext. Wrist flex. intrinsics Hip flex. Hip ext. Knee ext. Knee flex Dorsi flex Plantar flex   Right 5 5 5 5 5 5 5 5 5 5 5 5   Left 5 5 5 5 5 5 5 5 5 5 5 5         Sensory:  Upper extremity: Vibration was intact in her bilateral upper extremities. Pinprick was decreased in the median distribution bilaterally. Slightly worse on the right. Lower extremity: intact to pp, light touch, and vibration > 10 seconds    Tinel's was absent as well as Phalen's bilaterally    Reflexes:    Right Left  Biceps  2 2  Triceps             2 2  Brachiorad. 2 2  Patella  2 2  Achilles 2 2    Plantar response:  flexor bilaterally      Cerebellar testing:  no tremor apparent, finger/nose and jeison were intact    Romberg: absent    Gait: steady.   Heel, toe, and tandem gait were normal    Labs:     Lab Results   Component Value Date/Time    Hemoglobin A1c 5.1 06/17/2022 10:30 AM    Sodium 142 06/27/2022 05:48 AM    Potassium 3.4 (L) 06/27/2022 05:48 AM    Chloride 111 (H) 06/27/2022 05:48 AM    Glucose 90 06/27/2022 05:48 AM    BUN 10 06/27/2022 05:48 AM    Creatinine 0.61 06/27/2022 05:48 AM    Calcium 8.5 06/27/2022 05:48 AM    WBC 2.8 (L) 09/30/2022 12:00 AM    HCT 36.2 09/30/2022 12:00 AM    HGB 11.2 09/30/2022 12:00 AM    PLATELET 823 74/96/8609 12:00 AM       Imaging:    No results found for this or any previous visit. Results from [place] encounter on 07/20/22    CT ABD PELV W CONT    Narrative  INDICATION: Abdominal pain 1 month postop    COMPARISON: 6/9/2022    TECHNIQUE:  Following the uneventful intravenous administration of 100 cc Isovue-370, thin  axial images were obtained through the abdomen and pelvis. Coronal and sagittal  reconstructions were generated. Oral contrast was administered. CT dose  reduction was achieved through use of a standardized protocol tailored for this  examination and automatic exposure control for dose modulation. FINDINGS:  LUNG BASES: Clear. INCIDENTALLY IMAGED HEART AND MEDIASTINUM: Unremarkable. LIVER: No mass or fatty infiltration. GALLBLADDER: Unremarkable. SPLEEN: Not enlarged. PANCREAS: No mass or inflammation  ADRENALS: Unremarkable. KIDNEYS: No mass, calculus, or hydronephrosis. BOWEL: No evidence of obstruction. Post low anterior resection resection  APPENDIX: Not identified  PERITONEUM: There is a small fluid collection in the deep pelvis best seen on  series 3 image 55 measuring 3.5 x 2.5 x 3.5 cm. Given the location of bowel and  surrounding bone percutaneous sampling or drainage would be difficult. Inflammation of the soft tissues anterior to the sacrum  RETROPERITONEUM: No lymphadenopathy or aortic aneurysm. BLADDER: No wall thickening or stone  REPRODUCTIVE ORGANS: Surgically absent  BONES: No destructive bone lesion. ADDITIONAL COMMENTS: N/A    Impression  1.  Post low anterior resection. Small fluid collection deep within the pelvis. May represent postoperative seroma but abscess cannot be excluded. Given  adjacent bowel and bone percutaneous sampling or drainage would be difficult             Patient Active Problem List   Diagnosis Code    Anemia D64.9    Depression F32. 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 I will contact her when the laboratory results return. Renewed medication: None    I spent 60   minutes on the day of the encounter preparing the office visit by reviewing medical records, obtaining a history, performing examination, counseling and educating the patient and family members on diagnosis, ordering tests, documenting in the clinical medical record, and coordinating the care for the patient. The patient had the ability to ask questions and all questions were answered.                Signed By:  Saskia Rodriguez DO FAAN    November 2, 2022

## 2022-11-02 ENCOUNTER — OFFICE VISIT (OUTPATIENT)
Dept: NEUROLOGY | Age: 47
End: 2022-11-02
Payer: MEDICAID

## 2022-11-02 VITALS
HEIGHT: 64 IN | SYSTOLIC BLOOD PRESSURE: 106 MMHG | WEIGHT: 130 LBS | BODY MASS INDEX: 22.2 KG/M2 | HEART RATE: 76 BPM | RESPIRATION RATE: 16 BRPM | OXYGEN SATURATION: 100 % | DIASTOLIC BLOOD PRESSURE: 70 MMHG

## 2022-11-02 DIAGNOSIS — R20.2 NUMBNESS AND TINGLING OF LEFT ARM AND LEG: ICD-10-CM

## 2022-11-02 DIAGNOSIS — R20.9 DISTURBANCE OF SKIN SENSATION: Primary | ICD-10-CM

## 2022-11-02 DIAGNOSIS — E55.9 VITAMIN D DEFICIENCY: ICD-10-CM

## 2022-11-02 DIAGNOSIS — G56.03 BILATERAL CARPAL TUNNEL SYNDROME: ICD-10-CM

## 2022-11-02 DIAGNOSIS — G60.9 IDIOPATHIC PERIPHERAL NEUROPATHY: ICD-10-CM

## 2022-11-02 DIAGNOSIS — R20.0 NUMBNESS AND TINGLING OF LEFT ARM AND LEG: ICD-10-CM

## 2022-11-02 PROCEDURE — 99205 OFFICE O/P NEW HI 60 MIN: CPT | Performed by: PSYCHIATRY & NEUROLOGY

## 2022-11-02 NOTE — LETTER
11/2/2022    Patient: Aniket Rock   YOB: 1975   Date of Visit: 11/2/2022     Richard Butts MD  [address] [place] [address] [phone]  [facility] [facility] [place]    Dear Joshua Olvera MD,      Thank you for referring Ms. Todd Herndon to [address] [place] [address] for evaluation. My notes for this consultation are attached. If you have questions, please do not hesitate to call me. I look forward to following your patient along with you.       Sincerely,    Ward Greco, DO

## 2022-11-04 LAB
25(OH)D3+25(OH)D2 SERPL-MCNC: 43.4 NG/ML (ref 30–100)
IGA SERPL-MCNC: 382 MG/DL (ref 87–352)
IGG SERPL-MCNC: 1423 MG/DL (ref 586–1602)
IGM SERPL-MCNC: 235 MG/DL (ref 26–217)
PROT PATTERN SERPL IFE-IMP: ABNORMAL

## 2023-05-01 ENCOUNTER — HOSPITAL ENCOUNTER (EMERGENCY)
Age: 48
Discharge: HOME | End: 2023-05-01
Attending: EMERGENCY MEDICINE
Payer: MEDICAID

## 2023-05-01 ENCOUNTER — APPOINTMENT (OUTPATIENT)
Dept: ULTRASOUND IMAGING | Age: 48
End: 2023-05-01
Attending: PHYSICIAN ASSISTANT
Payer: MEDICAID

## 2023-05-01 ENCOUNTER — APPOINTMENT (OUTPATIENT)
Dept: CT IMAGING | Age: 48
End: 2023-05-01
Attending: PHYSICIAN ASSISTANT
Payer: MEDICAID

## 2023-05-01 VITALS
OXYGEN SATURATION: 100 % | SYSTOLIC BLOOD PRESSURE: 116 MMHG | RESPIRATION RATE: 16 BRPM | TEMPERATURE: 97.5 F | HEART RATE: 99 BPM | DIASTOLIC BLOOD PRESSURE: 65 MMHG

## 2023-05-01 DIAGNOSIS — K59.00 CONSTIPATION, UNSPECIFIED CONSTIPATION TYPE: ICD-10-CM

## 2023-05-01 DIAGNOSIS — R11.0 NAUSEA: ICD-10-CM

## 2023-05-01 DIAGNOSIS — N94.89 ADNEXAL MASS: ICD-10-CM

## 2023-05-01 DIAGNOSIS — R10.84 ABDOMINAL PAIN, GENERALIZED: Primary | ICD-10-CM

## 2023-05-01 DIAGNOSIS — K82.4 GALLBLADDER POLYP: ICD-10-CM

## 2023-05-01 DIAGNOSIS — R17 SERUM TOTAL BILIRUBIN ELEVATED: ICD-10-CM

## 2023-05-01 LAB
ALBUMIN SERPL-MCNC: 4.7 G/DL (ref 3.5–5)
ALBUMIN/GLOB SERPL: 1.1 (ref 1.1–2.2)
ALP SERPL-CCNC: 56 U/L (ref 45–117)
ALT SERPL-CCNC: 13 U/L (ref 12–78)
ANION GAP SERPL CALC-SCNC: 7 MMOL/L (ref 5–15)
APPEARANCE UR: CLEAR
AST SERPL-CCNC: 9 U/L (ref 15–37)
BACTERIA URNS QL MICRO: NEGATIVE /HPF
BASOPHILS # BLD: 0 K/UL (ref 0–0.1)
BASOPHILS NFR BLD: 1 % (ref 0–1)
BILIRUB SERPL-MCNC: 3 MG/DL (ref 0.2–1)
BILIRUB UR QL: NEGATIVE
BUN SERPL-MCNC: 9 MG/DL (ref 6–20)
BUN/CREAT SERPL: 10 (ref 12–20)
CALCIUM SERPL-MCNC: 9.7 MG/DL (ref 8.5–10.1)
CHLORIDE SERPL-SCNC: 104 MMOL/L (ref 97–108)
CO2 SERPL-SCNC: 24 MMOL/L (ref 21–32)
COLOR UR: ABNORMAL
COMMENT, HOLDF: NORMAL
CREAT SERPL-MCNC: 0.87 MG/DL (ref 0.55–1.02)
DIFFERENTIAL METHOD BLD: NORMAL
EOSINOPHIL # BLD: 0 K/UL (ref 0–0.4)
EOSINOPHIL NFR BLD: 1 % (ref 0–7)
EPITH CASTS URNS QL MICRO: ABNORMAL /LPF
ERYTHROCYTE [DISTWIDTH] IN BLOOD BY AUTOMATED COUNT: 13 % (ref 11.5–14.5)
GLOBULIN SER CALC-MCNC: 4.3 G/DL (ref 2–4)
GLUCOSE SERPL-MCNC: 88 MG/DL (ref 65–100)
GLUCOSE UR STRIP.AUTO-MCNC: NEGATIVE MG/DL
HCG UR QL: NEGATIVE
HCT VFR BLD AUTO: 44.4 % (ref 35–47)
HGB BLD-MCNC: 14 G/DL (ref 11.5–16)
HGB UR QL STRIP: NEGATIVE
HYALINE CASTS URNS QL MICRO: ABNORMAL /LPF (ref 0–5)
IMM GRANULOCYTES # BLD AUTO: 0 K/UL (ref 0–0.04)
IMM GRANULOCYTES NFR BLD AUTO: 0 % (ref 0–0.5)
KETONES UR QL STRIP.AUTO: 15 MG/DL
LACTATE BLD-SCNC: 0.55 MMOL/L (ref 0.4–2)
LEUKOCYTE ESTERASE UR QL STRIP.AUTO: NEGATIVE
LIPASE SERPL-CCNC: 98 U/L (ref 73–393)
LYMPHOCYTES # BLD: 1.3 K/UL (ref 0.8–3.5)
LYMPHOCYTES NFR BLD: 33 % (ref 12–49)
MCH RBC QN AUTO: 29.7 PG (ref 26–34)
MCHC RBC AUTO-ENTMCNC: 31.5 G/DL (ref 30–36.5)
MCV RBC AUTO: 94.1 FL (ref 80–99)
MONOCYTES # BLD: 0.4 K/UL (ref 0–1)
MONOCYTES NFR BLD: 10 % (ref 5–13)
NEUTS SEG # BLD: 2.1 K/UL (ref 1.8–8)
NEUTS SEG NFR BLD: 55 % (ref 32–75)
NITRITE UR QL STRIP.AUTO: NEGATIVE
NRBC # BLD: 0 K/UL (ref 0–0.01)
NRBC BLD-RTO: 0 PER 100 WBC
PH UR STRIP: 6 (ref 5–8)
PLATELET # BLD AUTO: 256 K/UL (ref 150–400)
PMV BLD AUTO: 9.8 FL (ref 8.9–12.9)
POTASSIUM SERPL-SCNC: 3.7 MMOL/L (ref 3.5–5.1)
PROT SERPL-MCNC: 9 G/DL (ref 6.4–8.2)
PROT UR STRIP-MCNC: NEGATIVE MG/DL
RBC # BLD AUTO: 4.72 M/UL (ref 3.8–5.2)
RBC #/AREA URNS HPF: ABNORMAL /HPF (ref 0–5)
SAMPLES BEING HELD,HOLD: NORMAL
SODIUM SERPL-SCNC: 135 MMOL/L (ref 136–145)
SP GR UR REFRACTOMETRY: 1.02 (ref 1–1.03)
UR CULT HOLD, URHOLD: NORMAL
UROBILINOGEN UR QL STRIP.AUTO: 0.2 EU/DL (ref 0.2–1)
WBC # BLD AUTO: 3.8 K/UL (ref 3.6–11)
WBC URNS QL MICRO: ABNORMAL /HPF (ref 0–4)

## 2023-05-01 PROCEDURE — 83605 ASSAY OF LACTIC ACID: CPT

## 2023-05-01 PROCEDURE — 74011250636 HC RX REV CODE- 250/636: Performed by: PHYSICIAN ASSISTANT

## 2023-05-01 PROCEDURE — 72193 CT PELVIS W/DYE: CPT

## 2023-05-01 PROCEDURE — 83690 ASSAY OF LIPASE: CPT

## 2023-05-01 PROCEDURE — 96374 THER/PROPH/DIAG INJ IV PUSH: CPT

## 2023-05-01 PROCEDURE — 74011000636 HC RX REV CODE- 636: Performed by: RADIOLOGY

## 2023-05-01 PROCEDURE — 85025 COMPLETE CBC W/AUTO DIFF WBC: CPT

## 2023-05-01 PROCEDURE — 76705 ECHO EXAM OF ABDOMEN: CPT

## 2023-05-01 PROCEDURE — 81001 URINALYSIS AUTO W/SCOPE: CPT

## 2023-05-01 PROCEDURE — 96375 TX/PRO/DX INJ NEW DRUG ADDON: CPT

## 2023-05-01 PROCEDURE — 99285 EMERGENCY DEPT VISIT HI MDM: CPT

## 2023-05-01 PROCEDURE — 36415 COLL VENOUS BLD VENIPUNCTURE: CPT

## 2023-05-01 PROCEDURE — 74011000636 HC RX REV CODE- 636: Performed by: STUDENT IN AN ORGANIZED HEALTH CARE EDUCATION/TRAINING PROGRAM

## 2023-05-01 PROCEDURE — 74177 CT ABD & PELVIS W/CONTRAST: CPT

## 2023-05-01 PROCEDURE — 81025 URINE PREGNANCY TEST: CPT

## 2023-05-01 PROCEDURE — 80053 COMPREHEN METABOLIC PANEL: CPT

## 2023-05-01 PROCEDURE — 96376 TX/PRO/DX INJ SAME DRUG ADON: CPT

## 2023-05-01 RX ORDER — ONDANSETRON 4 MG/1
4 TABLET, ORALLY DISINTEGRATING ORAL
Qty: 10 TABLET | Refills: 0 | Status: SHIPPED | OUTPATIENT
Start: 2023-05-01

## 2023-05-01 RX ORDER — POLYETHYLENE GLYCOL 3350 17 G/17G
17 POWDER, FOR SOLUTION ORAL 2 TIMES DAILY
Qty: 235 G | Refills: 0 | Status: SHIPPED | OUTPATIENT
Start: 2023-05-01

## 2023-05-01 RX ORDER — ONDANSETRON 2 MG/ML
4 INJECTION INTRAMUSCULAR; INTRAVENOUS
Status: COMPLETED | OUTPATIENT
Start: 2023-05-01 | End: 2023-05-01

## 2023-05-01 RX ORDER — KETOROLAC TROMETHAMINE 30 MG/ML
15 INJECTION, SOLUTION INTRAMUSCULAR; INTRAVENOUS
Status: COMPLETED | OUTPATIENT
Start: 2023-05-01 | End: 2023-05-01

## 2023-05-01 RX ORDER — KETOROLAC TROMETHAMINE 30 MG/ML
30 INJECTION, SOLUTION INTRAMUSCULAR; INTRAVENOUS
Status: COMPLETED | OUTPATIENT
Start: 2023-05-01 | End: 2023-05-01

## 2023-05-01 RX ADMIN — IOPAMIDOL 100 ML: 612 INJECTION, SOLUTION INTRAVENOUS at 16:47

## 2023-05-01 RX ADMIN — IOHEXOL 50 ML: 240 INJECTION, SOLUTION INTRATHECAL; INTRAVASCULAR; INTRAVENOUS; ORAL at 16:47

## 2023-05-01 RX ADMIN — ONDANSETRON 4 MG: 2 INJECTION INTRAMUSCULAR; INTRAVENOUS at 11:53

## 2023-05-01 RX ADMIN — KETOROLAC TROMETHAMINE 30 MG: 30 INJECTION, SOLUTION INTRAMUSCULAR; INTRAVENOUS at 18:39

## 2023-05-01 RX ADMIN — KETOROLAC TROMETHAMINE 15 MG: 30 INJECTION, SOLUTION INTRAMUSCULAR; INTRAVENOUS at 11:53

## 2023-05-01 RX ADMIN — IOPAMIDOL 100 ML: 755 INJECTION, SOLUTION INTRAVENOUS at 12:52

## 2023-05-01 RX ADMIN — SODIUM CHLORIDE 1000 ML: 9 INJECTION, SOLUTION INTRAVENOUS at 11:52

## 2023-05-01 NOTE — ED PROVIDER NOTES
55yo female with hx of chronic anemia, uterine fibroids s/p total laparoscopic hysterectomy and rectal neoplasm s/p low anterior resection, mobilization of the splenic flexure, and coloproctostomy in July 2022 here c/o RUQ and RLQ abd discomfort as well as constipation requiring laxatives to produce a bowel movement the past 2 weeks. Denies F/C, vomiting, diarrhea. No hemoptysis, SOB, palpitations, chest pain. No recent surgeries, travel, leg swelling, calf pain, estrogen use, hx of DVT/PE.      Colorectal surgeon: Lynda Bliss  GYN: Berry     Social hx- no ETOH use             Past Medical History:   Diagnosis Date    Anemia     Anxiety and depression     anxity uses meds    COVID-19 vaccine series completed     Pfizer dose #1 received on 7/9/2021; dose of #2 received on 7/30/2021; dose #3 received on 1/19/2022    Depression     self treats with essential oils    History of colonic polyps     Leiomyoma of uterus     Other ill-defined conditions(799.89)     chronic hives    Vaginal delivery     Twice       Past Surgical History:   Procedure Laterality Date    COLONOSCOPY N/A 05/20/2022    Dr. Silvia Sue ENDOSCOPY  05/20/2022    EGD; Dr. Silvia Sue GYN      cerclage x2    HX GYN      Cervical conization    HX HEENT  02/2018    4 teeth removed     HX HYSTERECTOMY  06/23/2022         Family History:   Problem Relation Age of Onset    Cancer Mother         uterine    Diabetes Mother     Alzheimer's Disease Father     Dementia Father     Hypertension Sister     Hypertension Brother     Diabetes Brother     Breast Cancer Maternal Aunt     Breast Cancer Maternal Uncle     Hypertension Sister     Hypertension Brother        Social History     Socioeconomic History    Marital status:      Spouse name: Not on file    Number of children: Not on file    Years of education: Not on file    Highest education level: Not on file   Occupational History    Not on file   Tobacco Use    Smoking status: Never    Smokeless tobacco: Never   Vaping Use    Vaping Use: Never used   Substance and Sexual Activity    Alcohol use: Yes     Comment: rarely    Drug use: No    Sexual activity: Yes     Partners: Male     Birth control/protection: None   Other Topics Concern    Not on file   Social History Narrative    Not on file     Social Determinants of Health     Financial Resource Strain: Not on file   Food Insecurity: Not on file   Transportation Needs: Not on file   Physical Activity: Not on file   Stress: Not on file   Social Connections: Not on file   Intimate Partner Violence: Not on file   Housing Stability: Not on file         ALLERGIES: Patient has no known allergies. Review of Systems    Vitals:    05/01/23 1107   BP: 118/73   Pulse: 88   Resp: 16   Temp: 97.5 °F (36.4 °C)   SpO2: 100%            Physical Exam  Vitals and nursing note reviewed. Constitutional:       General: She is not in acute distress. Appearance: Normal appearance. She is well-developed. She is not toxic-appearing or diaphoretic. HENT:      Head: Normocephalic and atraumatic. Eyes:      General:         Right eye: No discharge. Left eye: No discharge. Conjunctiva/sclera: Conjunctivae normal.   Neck:      Trachea: No tracheal tenderness. Cardiovascular:      Rate and Rhythm: Normal rate and regular rhythm. Pulses: Normal pulses. Heart sounds: Normal heart sounds. No murmur heard. No friction rub. No gallop. Pulmonary:      Effort: Pulmonary effort is normal. No respiratory distress. Breath sounds: Normal breath sounds. No wheezing or rales. Chest:      Chest wall: No tenderness. Abdominal:      General: Bowel sounds are normal. There is no distension. Palpations: Abdomen is soft. Tenderness: There is abdominal tenderness (RUQ, epigastric and RLQ TTP). There is no guarding or rebound. Musculoskeletal:         General: No tenderness. Normal range of motion.       Cervical back: Full passive range of motion without pain and normal range of motion. Skin:     General: Skin is warm and dry. Capillary Refill: Capillary refill takes less than 2 seconds. Findings: No abrasion, erythema or rash. Neurological:      General: No focal deficit present. Mental Status: She is alert and oriented to person, place, and time. Cranial Nerves: No cranial nerve deficit. Sensory: No sensory deficit. Coordination: Coordination normal.   Psychiatric:         Speech: Speech normal.         Behavior: Behavior normal.        Medical Decision Making    Ddx: SBO, intra-abd mass, cholecystitis, constipation    Amount and/or Complexity of Data Reviewed  Labs: ordered. Radiology: ordered. Risk  Prescription drug management. Decision regarding hospitalization. Procedures    PERC score 0. I discussed patient's PMH, exam findings as well as careplan with the ER attending who agrees with care plan. Antonella Levy PA-C    CONSULT NOTE:   3:07 PM  Antonella Levy PA-C spoke with ,   Specialty: colo-rectal surgeon  Discussed pt's hx, disposition, and available diagnostic and imaging results. Reviewed care plans. Consultant agrees with plans as outlined. Dr. Zach Gonzalez reviewed the images, recommends CT with rectal contrast to further assess the anastomosis and further comment on multicystic structure seen on CT. Written by Antonella Levy PA-C.    6pm- No bowel obstruction or anastomotic abnormality on CT with rectal contrast. Have been trying to reach colorectal surgery since 4:50p via perfect serve and office answering service. Found to have elevated t-bili today so RUQ ABD US ordered. If unremarkable d/w Dr. Evon Joyce who states if work-up unremarkable plan for discharge with close follow-up with colorectal surgery, GI, GYN. Care turned over to CANDACE Bob at my end of shift.

## 2023-05-01 NOTE — ED NOTES
Verbal shift change report given to [facility] (oncoming nurse) by Severa Pierre (offgoing nurse). Report included the following information SBAR, ED Summary, Intake/Output and MAR.

## 2023-05-01 NOTE — ED TRIAGE NOTES
Pt stated she has been having problems with constipation and now has noticed a lump under her left rib area x 2 weeks, last bm 2 days ago, denies fever

## 2023-05-04 ENCOUNTER — OFFICE VISIT (OUTPATIENT)
Dept: GYNECOLOGY | Age: 48
End: 2023-05-04
Payer: MEDICAID

## 2023-05-04 VITALS
BODY MASS INDEX: 23.22 KG/M2 | DIASTOLIC BLOOD PRESSURE: 70 MMHG | SYSTOLIC BLOOD PRESSURE: 106 MMHG | WEIGHT: 136 LBS | HEART RATE: 73 BPM | HEIGHT: 64 IN

## 2023-05-04 DIAGNOSIS — N83.201 CYST OF RIGHT OVARY: ICD-10-CM

## 2023-05-04 DIAGNOSIS — R10.31 RIGHT LOWER QUADRANT ABDOMINAL PAIN: Primary | ICD-10-CM

## 2023-05-04 PROCEDURE — 99213 OFFICE O/P EST LOW 20 MIN: CPT | Performed by: OBSTETRICS & GYNECOLOGY

## 2023-05-04 RX ORDER — BISACODYL 5 MG
5 TABLET, DELAYED RELEASE (ENTERIC COATED) ORAL DAILY PRN
COMMUNITY

## 2023-05-04 RX ORDER — TRAMADOL HYDROCHLORIDE 50 MG/1
50 TABLET ORAL
COMMUNITY

## 2023-05-09 DIAGNOSIS — N83.201 CYST OF RIGHT OVARY: ICD-10-CM

## 2023-05-09 DIAGNOSIS — R10.31 RIGHT LOWER QUADRANT ABDOMINAL PAIN: Primary | ICD-10-CM

## 2023-05-25 ENCOUNTER — HOSPITAL ENCOUNTER (OUTPATIENT)
Facility: HOSPITAL | Age: 48
Setting detail: OUTPATIENT SURGERY
Discharge: HOME | End: 2023-05-25
Attending: INTERNAL MEDICINE | Admitting: INTERNAL MEDICINE
Payer: MEDICAID

## 2023-05-25 ENCOUNTER — ANESTHESIA EVENT (OUTPATIENT)
Facility: HOSPITAL | Age: 48
End: 2023-05-25
Payer: MEDICAID

## 2023-05-25 ENCOUNTER — ANESTHESIA (OUTPATIENT)
Facility: HOSPITAL | Age: 48
End: 2023-05-25
Payer: MEDICAID

## 2023-05-25 VITALS
OXYGEN SATURATION: 100 % | HEART RATE: 96 BPM | TEMPERATURE: 97.8 F | RESPIRATION RATE: 19 BRPM | HEIGHT: 64 IN | WEIGHT: 133 LBS | BODY MASS INDEX: 22.71 KG/M2 | DIASTOLIC BLOOD PRESSURE: 76 MMHG | SYSTOLIC BLOOD PRESSURE: 113 MMHG

## 2023-05-25 PROCEDURE — 3600007512: Performed by: INTERNAL MEDICINE

## 2023-05-25 PROCEDURE — 3700000001 HC ADD 15 MINUTES (ANESTHESIA): Performed by: INTERNAL MEDICINE

## 2023-05-25 PROCEDURE — 7100000010 HC PHASE II RECOVERY - FIRST 15 MIN: Performed by: INTERNAL MEDICINE

## 2023-05-25 PROCEDURE — 3700000000 HC ANESTHESIA ATTENDED CARE: Performed by: INTERNAL MEDICINE

## 2023-05-25 PROCEDURE — 7100000011 HC PHASE II RECOVERY - ADDTL 15 MIN: Performed by: INTERNAL MEDICINE

## 2023-05-25 PROCEDURE — 3600007502: Performed by: INTERNAL MEDICINE

## 2023-05-25 PROCEDURE — 2580000003 HC RX 258: Performed by: INTERNAL MEDICINE

## 2023-05-25 PROCEDURE — 2500000003 HC RX 250 WO HCPCS: Performed by: NURSE ANESTHETIST, CERTIFIED REGISTERED

## 2023-05-25 PROCEDURE — 6360000002 HC RX W HCPCS: Performed by: NURSE ANESTHETIST, CERTIFIED REGISTERED

## 2023-05-25 RX ORDER — SODIUM CHLORIDE 0.9 % (FLUSH) 0.9 %
5-40 SYRINGE (ML) INJECTION PRN
Status: DISCONTINUED | OUTPATIENT
Start: 2023-05-25 | End: 2023-05-25 | Stop reason: HOSPADM

## 2023-05-25 RX ORDER — SODIUM CHLORIDE 0.9 % (FLUSH) 0.9 %
5-40 SYRINGE (ML) INJECTION EVERY 12 HOURS SCHEDULED
Status: DISCONTINUED | OUTPATIENT
Start: 2023-05-25 | End: 2023-05-25 | Stop reason: HOSPADM

## 2023-05-25 RX ORDER — PHENYLEPHRINE HCL IN 0.9% NACL 0.4MG/10ML
SYRINGE (ML) INTRAVENOUS PRN
Status: DISCONTINUED | OUTPATIENT
Start: 2023-05-25 | End: 2023-05-25 | Stop reason: SDUPTHER

## 2023-05-25 RX ORDER — DIMENHYDRINATE 50 MG/1
TABLET ORAL AS NEEDED
COMMUNITY

## 2023-05-25 RX ORDER — SODIUM CHLORIDE 9 MG/ML
INJECTION, SOLUTION INTRAVENOUS CONTINUOUS
Status: DISCONTINUED | OUTPATIENT
Start: 2023-05-25 | End: 2023-05-25 | Stop reason: HOSPADM

## 2023-05-25 RX ORDER — LIDOCAINE HYDROCHLORIDE 20 MG/ML
INJECTION, SOLUTION EPIDURAL; INFILTRATION; INTRACAUDAL; PERINEURAL PRN
Status: DISCONTINUED | OUTPATIENT
Start: 2023-05-25 | End: 2023-05-25 | Stop reason: SDUPTHER

## 2023-05-25 RX ORDER — SODIUM CHLORIDE 9 MG/ML
25 INJECTION, SOLUTION INTRAVENOUS PRN
Status: DISCONTINUED | OUTPATIENT
Start: 2023-05-25 | End: 2023-05-25 | Stop reason: HOSPADM

## 2023-05-25 RX ADMIN — PROPOFOL 50 MG: 10 INJECTION, EMULSION INTRAVENOUS at 12:25

## 2023-05-25 RX ADMIN — SODIUM CHLORIDE: 9 INJECTION, SOLUTION INTRAVENOUS at 12:11

## 2023-05-25 RX ADMIN — PROPOFOL 25 MG: 10 INJECTION, EMULSION INTRAVENOUS at 12:28

## 2023-05-25 RX ADMIN — PROPOFOL 25 MG: 10 INJECTION, EMULSION INTRAVENOUS at 12:26

## 2023-05-25 RX ADMIN — Medication 80 MCG: at 12:39

## 2023-05-25 RX ADMIN — PROPOFOL 50 MG: 10 INJECTION, EMULSION INTRAVENOUS at 12:38

## 2023-05-25 RX ADMIN — PROPOFOL 25 MG: 10 INJECTION, EMULSION INTRAVENOUS at 12:30

## 2023-05-25 RX ADMIN — PROPOFOL 100 MG: 10 INJECTION, EMULSION INTRAVENOUS at 12:22

## 2023-05-25 RX ADMIN — LIDOCAINE HYDROCHLORIDE 40 MG: 20 INJECTION, SOLUTION EPIDURAL; INFILTRATION; INTRACAUDAL; PERINEURAL at 12:22

## 2023-05-25 RX ADMIN — PROPOFOL 25 MG: 10 INJECTION, EMULSION INTRAVENOUS at 12:34

## 2023-05-25 RX ADMIN — PROPOFOL 50 MG: 10 INJECTION, EMULSION INTRAVENOUS at 12:23

## 2023-05-25 RX ADMIN — PROPOFOL 25 MG: 10 INJECTION, EMULSION INTRAVENOUS at 12:36

## 2023-05-25 RX ADMIN — PROPOFOL 25 MG: 10 INJECTION, EMULSION INTRAVENOUS at 12:32

## 2023-05-25 NOTE — DISCHARGE INSTRUCTIONS
Babatunde Brito [address] Yamileth Grover M.D.  [place]aniya Salas, [address]  [phone]          COLONOSCOPY DISCHARGE INSTRUCTIONS    Tanner Bosworth  [id number]  1975    DISCOMFORT:  Redness at IV site- apply warm compress to area; if redness or soreness persist- contact your physician  There may be a slight amount of blood passed from the rectum  Gaseous discomfort- walking, belching will help relieve any discomfort  You may not operate a vehicle for 12 hours  You may not engage in an occupation involving machinery or appliances for the  rest of today  You may not drink alcoholic beverages for at least 12 hours  Avoid making any critical decisions for at least 24 hours    DIET:   You may resume your normal diet, but some patients find that heavy or large  meals may lead to indigestion or vomiting. I suggest a light meal as first food  intake. I recommend a whole food, plant-based diet for your overall health. ACTIVITY:  You may resume your normal daily activities. It is recommended that you spend the remainder of the day resting - avoid any strenuous activity. CALL HORACIO Ruby ANY SIGN OF:   Increasing pain, nausea, vomiting  Abdominal distension (swelling)  Significant bleeding (oral or rectal)  Fever   Pain in chest area  Shortness of breath    Additional Instructions:   Call Dr. Yamileth Grover if any questions or problems at [phone]   Should have a repeat colonoscopy in 3 years. Colonoscopy showed normal anastomosis without clear obstruction. No polyp or mass seen.

## 2023-05-25 NOTE — ANESTHESIA PRE PROCEDURE
Department of Anesthesiology  Preprocedure Note       Name:  Foster Onofre   Age:  52 y.o.  :  1975                                          MRN:  [id number]         Date:  2023      Surgeon: Mikel Helm): Daniel Coles MD    Procedure: Procedure(s):  COLONOSCOPY    Medications prior to admission:   Prior to Admission medications    Medication Sig Start Date End Date Taking? Authorizing Provider   dimenhyDRINATE (DRAMAMINE) 50 MG tablet Take by mouth as needed Not sure of dose   Yes Historical Provider, MD   acetaminophen (TYLENOL) 500 MG CAPS capsule Take 2 capsules by mouth every 6 hours as needed 22   Ar Automatic Reconciliation   cetirizine (ZYRTEC) 10 MG tablet Take 1 tablet by mouth daily as needed    Ar Automatic Reconciliation   ondansetron (ZOFRAN-ODT) 4 MG disintegrating tablet ceived the following from [address]: Outside name: ondansetron (ZOFRAN ODT) 4 mg disintegrating tablet 22   Ar Automatic Reconciliation       Current medications:    Current Facility-Administered Medications   Medication Dose Route Frequency Provider Last Rate Last Admin    0.9 % sodium chloride infusion   IntraVENous Continuous Daniel Coles MD        sodium chloride flush 0.9 % injection 5-40 mL  5-40 mL IntraVENous 2 times per day Daniel Coles MD        sodium chloride flush 0.9 % injection 5-40 mL  5-40 mL IntraVENous PRN Daniel Coles MD        0.9 % sodium chloride infusion  25 mL IntraVENous PRN Daniel Coles MD           Allergies:  No Known Allergies    Problem List:    Patient Active Problem List   Diagnosis Code    Anemia D64.9    Depression F32. 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

## 2023-05-25 NOTE — ANESTHESIA POSTPROCEDURE EVALUATION
Department of Anesthesiology  Postprocedure Note    Patient: Dandy Virk  MRN: [id number]  YOB: 1975  Date of evaluation: 5/25/2023      Procedure Summary     Date: 05/25/23 Room / Location: [facility] ENDO 02 / [facility] ENDOSCOPY    Anesthesia Start: 1216 Anesthesia Stop: 0990    Procedure: COLONOSCOPY (Lower [facility]) Diagnosis:       Personal history of colonic polyps      (Personal history of colonic polyps [Z86.010])    Surgeons:  Rodger Rivera MD Responsible Provider: Aspen Abraham MD    Anesthesia Type: MAC ASA Status: 2          Anesthesia Type: MAC    Sveta Phase I: Sveta Score: 10    Sveta Phase II: Sveta Score: 10      Anesthesia Post Evaluation    Patient location during evaluation: PACU  Patient participation: complete - patient participated  Level of consciousness: awake  Pain score: 0  Airway patency: patent  Nausea & Vomiting: no nausea  Complications: no  Cardiovascular status: blood pressure returned to baseline and hemodynamically stable  Respiratory status: acceptable  Hydration status: stable

## 2023-05-25 NOTE — OP NOTE
Babatunde Conner [address] M.D.  [place], [address]  [phone]               Colonoscopy Procedure Note    NAME: Armando Montano  :  1975  MRN:  [id number]    Indications:   Personal history of colon polyps (screening only)-malignant polyp     : April Rucker MD    Referring Provider:  Medardo Winters MD    Staff: Circulator: Clark Carey RN  Endoscopy Technician: Tori Caldwell    Prosthetic devices, grafts, tissues, transplant, or devices implanted: none    Medicines:  MAC anesthesia      Procedure Details:  After informed consent was obtained with all risks and benefits of the procedure explained and preprocedure exam completed, the patient was placed in the left lateral decubitus position. Universal protocol for patient identification was performed and documented in the nursing notes. Throughout the procedure, the patient's blood pressure was monitored at least every five minutes; pulse, and oxygen saturations were monitored continuously. All vital signs were documented in the nursing notes. A digital rectal exam was performed and was normal.  The Olympus videocolonoscope  was inserted in the rectum and carefully advanced to the cecum, which was identified by the ileocecal valve and appendiceal orifice. The colonoscope was slowly withdrawn with careful evaluation between folds. Retroflexion in the rectum was performed; findings and interventions are described below. Procedure start time, extent reached time/cecum time, and procedure end time are documented in the nursing notes. The quality of preparation was adequate. Findings:   Evidence of LAR-anastomosis was normal without clear obstruction. No polyp or mass seen. Interventions:    none    Specimens:   * No specimens in log *    EBL:  None. Complications:   No immediate complications     Impression:  -See post-procedure diagnoses.      Recommendations:   -Repeat colonoscopy

## 2023-06-15 ENCOUNTER — HOSPITAL ENCOUNTER (OUTPATIENT)
Facility: HOSPITAL | Age: 48
Discharge: HOME | End: 2023-06-18
Payer: MEDICAID

## 2023-06-15 DIAGNOSIS — R10.31 RIGHT LOWER QUADRANT ABDOMINAL PAIN: ICD-10-CM

## 2023-06-15 DIAGNOSIS — N83.201 CYST OF RIGHT OVARY: ICD-10-CM

## 2023-06-15 PROCEDURE — 76830 TRANSVAGINAL US NON-OB: CPT

## 2023-06-15 PROCEDURE — 76856 US EXAM PELVIC COMPLETE: CPT

## 2023-06-19 NOTE — PROGRESS NOTES
[facility] [address], [address]  P [phone]  F [phone]    Office Note  Patient ID:  Name:  Elisha Shepard  MRN:  [id number]  :  1975/47 y.o. Date:  2023      HISTORY OF PRESENT ILLNESS:  Elisha Shepard is a 52 y.o.  premenopausal female who is an established patient that I saw as a consult in 2022. She was referred by Dr. Kimberly Rocha with [facility] She had been having menorrhagia and anemia secondary to uterine fibroids for several years, even to the point of requiring transfusions. Dr. Juana Boland had discussed surgery with her and was actually planning a robotic-assisted laparoscopic hysterectomy. A colonoscopy at that time revealed a large polyp in the rectosigmoid colon. The polyp could not be removed endoscopically. She was referred to Dr. Dima Carrillo for resection. He was planning on performing a hand-assisted laparoscopic colon resection with reanastomosis. Dr. Jessica Olivarez tried to coordinate with Dr. Juana Boland or one of her partners, but their schedules did not line up. I performed a total laparoscopic hysterectomy with bilateral salpingectomy at the time of hand-assisted laparoscopic low anterior resection with Dr. Dima Carrillo on 22. I saw her in the office for her post-operative visit in 2022. She was seen in there ER on 23 complaining of right sided abdominal pain and constipation. A CT scan demonstrated a cystic lesion in the right ovary measuring about 5 cm. She had seen Dr. Jessica Olivarez since her ER visit. She presented to me for consultation on 23. I reviewed the CT images. I felt she most likely had a hemorrhagic cyst.  I was not concerned for malignancy. I discussed options with her, including conservative management. Her pain was not severe at the time.   I explained we could repeat a pelvic ultrasound in 6 weeks to see if it

## 2023-06-20 ENCOUNTER — TELEMEDICINE (OUTPATIENT)
Age: 48
End: 2023-06-20
Payer: MEDICAID

## 2023-06-20 DIAGNOSIS — N83.201 UNSPECIFIED OVARIAN CYST, RIGHT SIDE: Primary | ICD-10-CM

## 2023-06-20 DIAGNOSIS — R10.31 RIGHT LOWER QUADRANT PAIN: ICD-10-CM

## 2023-06-20 PROCEDURE — 99213 OFFICE O/P EST LOW 20 MIN: CPT | Performed by: OBSTETRICS & GYNECOLOGY

## 2023-08-03 NOTE — PROGRESS NOTES
Likely iron deficiency anemia given low ferritin. Will d/w pt at f/u appt today. Notified HTS of -140 & 22 beat run VT. BP 72/0, afebrile, resting in bed. L chest/lung pain now 6/10 after tylenol (from 10/10). Pt denies other interventions and said will ask if needed later  HTS also made aware of Mg 1.9. No new order at this time

## 2023-08-17 ENCOUNTER — OFFICE VISIT (OUTPATIENT)
Age: 48
End: 2023-08-17
Payer: MEDICAID

## 2023-08-17 VITALS
WEIGHT: 137 LBS | HEIGHT: 64 IN | HEART RATE: 79 BPM | BODY MASS INDEX: 23.39 KG/M2 | RESPIRATION RATE: 16 BRPM | DIASTOLIC BLOOD PRESSURE: 73 MMHG | SYSTOLIC BLOOD PRESSURE: 112 MMHG | OXYGEN SATURATION: 99 %

## 2023-08-17 DIAGNOSIS — R10.32 INTERMITTENT LEFT LOWER QUADRANT ABDOMINAL PAIN: Primary | ICD-10-CM

## 2023-08-17 DIAGNOSIS — L90.5 PAIN IN SURGICAL SCAR: ICD-10-CM

## 2023-08-17 DIAGNOSIS — R52 PAIN IN SURGICAL SCAR: ICD-10-CM

## 2023-08-17 PROCEDURE — 99213 OFFICE O/P EST LOW 20 MIN: CPT | Performed by: NURSE PRACTITIONER

## 2023-08-17 RX ORDER — POLYETHYLENE GLYCOL 3350 17 G/17G
17 POWDER, FOR SOLUTION ORAL DAILY
COMMUNITY

## 2023-08-17 SDOH — ECONOMIC STABILITY: FOOD INSECURITY: WITHIN THE PAST 12 MONTHS, THE FOOD YOU BOUGHT JUST DIDN'T LAST AND YOU DIDN'T HAVE MONEY TO GET MORE.: NEVER TRUE

## 2023-08-17 SDOH — ECONOMIC STABILITY: HOUSING INSECURITY
IN THE LAST 12 MONTHS, WAS THERE A TIME WHEN YOU DID NOT HAVE A STEADY PLACE TO SLEEP OR SLEPT IN A SHELTER (INCLUDING NOW)?: NO

## 2023-08-17 SDOH — ECONOMIC STABILITY: FOOD INSECURITY: WITHIN THE PAST 12 MONTHS, YOU WORRIED THAT YOUR FOOD WOULD RUN OUT BEFORE YOU GOT MONEY TO BUY MORE.: NEVER TRUE

## 2023-08-17 SDOH — ECONOMIC STABILITY: INCOME INSECURITY: HOW HARD IS IT FOR YOU TO PAY FOR THE VERY BASICS LIKE FOOD, HOUSING, MEDICAL CARE, AND HEATING?: NOT HARD AT ALL

## 2023-08-17 SDOH — ECONOMIC STABILITY: TRANSPORTATION INSECURITY
IN THE PAST 12 MONTHS, HAS LACK OF TRANSPORTATION KEPT YOU FROM MEETINGS, WORK, OR FROM GETTING THINGS NEEDED FOR DAILY LIVING?: NO

## 2023-08-17 SDOH — ECONOMIC STABILITY: INCOME INSECURITY: HOW HARD IS IT FOR YOU TO PAY FOR THE VERY BASICS LIKE FOOD, HOUSING, MEDICAL CARE, AND HEATING?: SOMEWHAT HARD

## 2023-08-17 ASSESSMENT — PATIENT HEALTH QUESTIONNAIRE - PHQ9
2. FEELING DOWN, DEPRESSED OR HOPELESS: 0
SUM OF ALL RESPONSES TO PHQ QUESTIONS 1-9: 0
1. LITTLE INTEREST OR PLEASURE IN DOING THINGS: 0
SUM OF ALL RESPONSES TO PHQ9 QUESTIONS 1 & 2: 0
SUM OF ALL RESPONSES TO PHQ QUESTIONS 1-9: 0

## 2023-08-17 NOTE — PROGRESS NOTES
Chief Complaint   Patient presents with    Post-op Problem     Knot at surgery drain site          Health Maintenance Due   Topic Date Due    HIV screen  Never done    Hepatitis C screen  Never done    DTaP/Tdap/Td vaccine (1 - Tdap) Never done    Lipids  Never done    COVID-19 Vaccine (4 - Booster for Pfizer series) 03/16/2022    Flu vaccine (1) Never done           1. \"Have you been to the ER, urgent care clinic since your last visit? Hospitalized since your last visit? \"  Yes, [facility] for surgery issues     2. \"Have you seen or consulted any other health care providers outside of the [address] [place] [address] since your last visit? \" No     3. For patients aged 43-73: Has the patient had a colonoscopy / FIT/ Cologuard? Yes - no Care Gap present      If the patient is female:    4. For patients aged 43-66: Has the patient had a mammogram within the past 2 years? Yes - no Care Gap present 2022      5. For patients aged 21-65: Has the patient had a pap smear?  Yes - no Care Gap present 2022

## 2023-08-17 NOTE — PROGRESS NOTES
Subjective:     Chief Complaint   Patient presents with    Post-op Problem     Knot at surgery drain site         HPI:  52 y.o.  presents for surgical site discomfort concern. Patient gives some past medical history:  notes in 2022 had colonoscopy and they found \"a lesion in sigmoid area\" that had to have surgical removal, and was also suppose to have Hysterectomy   So had bowel resection and hysterectomy at same 6/2022  Since surgery, she has had some issues with irregular bowels, followed up with colorectal   Was having constipation this year and went to ER and CT done was suspicious and followed up with colorectal who put her on bowel regimen with miralax. Constipation is minimal now per patient     Today's concern: Drain tube site LLQ she has noticed a knot, thought scar tissue and was not bothering her, noticed several months ago. But in the past week, she was having some discomfort in that site now but only intermittent, only lasts couple seconds and says the pain is not severe. No pain when she presses on this site. No other GI symptoms or fever       No hospital, ER or specialist visits since last primary care visit except as noted above. 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     Leiomyoma of uterus     Other ill-defined conditions(799.89)     chronic hives    Vaginal delivery     Twice       Social History     Tobacco Use    Smoking status: Never    Smokeless tobacco: Never   Substance Use Topics    Alcohol use: Not Currently    Drug use: Yes     Types: Marijuana Marletta Nicki)     Comment: Gummies         No

## 2023-08-17 NOTE — PATIENT INSTRUCTIONS
Suggest follow up with colorectal surgery, likely scar for RANDOLPH drain and gas may contribute to intermittent discomfort.

## 2023-10-16 ENCOUNTER — OFFICE VISIT (OUTPATIENT)
Age: 48
End: 2023-10-16
Payer: MEDICAID

## 2023-10-16 VITALS
RESPIRATION RATE: 18 BRPM | SYSTOLIC BLOOD PRESSURE: 100 MMHG | TEMPERATURE: 97.6 F | BODY MASS INDEX: 23.42 KG/M2 | HEIGHT: 64 IN | WEIGHT: 137.2 LBS | HEART RATE: 72 BPM | OXYGEN SATURATION: 99 % | DIASTOLIC BLOOD PRESSURE: 64 MMHG

## 2023-10-16 DIAGNOSIS — R20.0 BILATERAL HAND NUMBNESS: ICD-10-CM

## 2023-10-16 DIAGNOSIS — E53.8 VITAMIN B 12 DEFICIENCY: ICD-10-CM

## 2023-10-16 DIAGNOSIS — R53.83 FATIGUE, UNSPECIFIED TYPE: ICD-10-CM

## 2023-10-16 DIAGNOSIS — R20.0 NUMBNESS OF LEFT ANTERIOR THIGH: ICD-10-CM

## 2023-10-16 DIAGNOSIS — G56.03 BILATERAL CARPAL TUNNEL SYNDROME: Primary | ICD-10-CM

## 2023-10-16 PROCEDURE — 99214 OFFICE O/P EST MOD 30 MIN: CPT | Performed by: NURSE PRACTITIONER

## 2023-10-16 RX ORDER — GABAPENTIN 100 MG/1
CAPSULE ORAL
Qty: 90 CAPSULE | Refills: 5 | Status: SHIPPED | OUTPATIENT
Start: 2023-10-16 | End: 2024-10-17

## 2023-10-16 ASSESSMENT — PATIENT HEALTH QUESTIONNAIRE - PHQ9
SUM OF ALL RESPONSES TO PHQ QUESTIONS 1-9: 0
1. LITTLE INTEREST OR PLEASURE IN DOING THINGS: 0
SUM OF ALL RESPONSES TO PHQ9 QUESTIONS 1 & 2: 0
SUM OF ALL RESPONSES TO PHQ QUESTIONS 1-9: 0
2. FEELING DOWN, DEPRESSED OR HOPELESS: 0

## 2023-10-16 ASSESSMENT — ENCOUNTER SYMPTOMS
BACK PAIN: 0
NAUSEA: 1
CONSTIPATION: 1

## 2023-10-16 NOTE — PROGRESS NOTES
Chief Complaint   Patient presents with    Neurologic Problem     More bothersome than last visit-ortho told her it was mild case of carpal tunnel  -is having numbness to upper left let - also in both arms at times   \"Pain is like when your hands get really cold - that is what it feels like\"     1. Have you been to the ER, urgent care clinic since your last visit? Hospitalized since your last visit? Yes [facility] ER for 5/1/23 for abd pain    2. Have you seen or consulted any other health care providers outside of the [address] [place] [address] Avenue since your last visit? Include any pap smears or colon screening.   Yes Dr Aggie Bean for endoscopy
testing with Dr. Alex Conley May 2022. Patient notes she will utilize her wrist splints consistently for 4 to 6 weeks and will contact us with any alleviation of her symptoms, if her symptoms worsen, consider referral to orthopedics for steroid injections, discussed repeat nerve conduction testing. She will defer at this time. I have placed patient on a small dose of gabapentin 100 mg titrating up to 3 times a day. Safety and side effects were discussed. She is to let me know how she is doing and can increase if needed. 2.  Numbness of the left anterior thigh: Discussed this could be a left meralgia paresthetica, consider EMG nerve conduction testing, she will defer at this time. Patient is to monitor for any worsening. 3.  Bilateral hand numbness: Discussed repeat EMG nerve conduction test, she will try to wear the carpal tunnel splints more regularly, if no relief she will contact the office for further recommendations. I have ordered several labs to include TSH, protein electrophoresis, hemoglobin A1c, CMP, vitamin B-12.  4.  Vitamin B12 deficiency: Lab values to be checked, will intervene based upon results. 5.  Fatigue: Healthy lifestyle encouraged, continue close monitoring and follow-up via primary care, I have ordered several labs, will modify plan of care based on results. Pace activities. Patient and/or family verbalized understand of all instructions and all questions/concerns were addressed. Safety/side effects of medications discussed. Patient remains a complex patient secondary to polypharmacy, significant comorbid conditions, and use of high-risk medications which complicate the decision making process related to patient's neurologic diagnosis. The patient is to follow up in 6 months, sooner if needed.     Kimmie Garcia, FNP-BC

## 2023-12-12 ENCOUNTER — HOSPITAL ENCOUNTER (EMERGENCY)
Facility: HOSPITAL | Age: 48
Discharge: HOME | End: 2023-12-12
Attending: EMERGENCY MEDICINE
Payer: MEDICAID

## 2023-12-12 ENCOUNTER — APPOINTMENT (OUTPATIENT)
Facility: HOSPITAL | Age: 48
End: 2023-12-12
Payer: MEDICAID

## 2023-12-12 VITALS
DIASTOLIC BLOOD PRESSURE: 90 MMHG | OXYGEN SATURATION: 99 % | RESPIRATION RATE: 16 BRPM | WEIGHT: 129.85 LBS | BODY MASS INDEX: 22.29 KG/M2 | SYSTOLIC BLOOD PRESSURE: 130 MMHG | HEART RATE: 90 BPM | TEMPERATURE: 98.5 F

## 2023-12-12 DIAGNOSIS — R10.13 DYSPEPSIA: Primary | ICD-10-CM

## 2023-12-12 DIAGNOSIS — K59.00 CONSTIPATION, UNSPECIFIED CONSTIPATION TYPE: ICD-10-CM

## 2023-12-12 DIAGNOSIS — Z85.038 HISTORY OF COLON CANCER: ICD-10-CM

## 2023-12-12 LAB
ALBUMIN SERPL-MCNC: 4.2 G/DL (ref 3.5–5)
ALBUMIN/GLOB SERPL: 1.1 (ref 1.1–2.2)
ALP SERPL-CCNC: 61 U/L (ref 45–117)
ALT SERPL-CCNC: 11 U/L (ref 12–78)
ANION GAP SERPL CALC-SCNC: 5 MMOL/L (ref 5–15)
AST SERPL-CCNC: 7 U/L (ref 15–37)
BASOPHILS # BLD: 0 K/UL (ref 0–0.1)
BASOPHILS NFR BLD: 0 % (ref 0–1)
BILIRUB SERPL-MCNC: 2.2 MG/DL (ref 0.2–1)
BUN SERPL-MCNC: 7 MG/DL (ref 6–20)
BUN/CREAT SERPL: 9 (ref 12–20)
CALCIUM SERPL-MCNC: 9.4 MG/DL (ref 8.5–10.1)
CHLORIDE SERPL-SCNC: 103 MMOL/L (ref 97–108)
CO2 SERPL-SCNC: 28 MMOL/L (ref 21–32)
COMMENT:: NORMAL
CREAT SERPL-MCNC: 0.79 MG/DL (ref 0.55–1.02)
DIFFERENTIAL METHOD BLD: ABNORMAL
EKG ATRIAL RATE: 90 BPM
EKG DIAGNOSIS: NORMAL
EKG P AXIS: -19 DEGREES
EKG P-R INTERVAL: 152 MS
EKG Q-T INTERVAL: 346 MS
EKG QRS DURATION: 68 MS
EKG QTC CALCULATION (BAZETT): 423 MS
EKG R AXIS: -11 DEGREES
EKG T AXIS: 1 DEGREES
EKG VENTRICULAR RATE: 90 BPM
EOSINOPHIL # BLD: 0 K/UL (ref 0–0.4)
EOSINOPHIL NFR BLD: 0 % (ref 0–7)
ERYTHROCYTE [DISTWIDTH] IN BLOOD BY AUTOMATED COUNT: 12.5 % (ref 11.5–14.5)
GLOBULIN SER CALC-MCNC: 4 G/DL (ref 2–4)
GLUCOSE SERPL-MCNC: 100 MG/DL (ref 65–100)
HCT VFR BLD AUTO: 40.8 % (ref 35–47)
HGB BLD-MCNC: 13.2 G/DL (ref 11.5–16)
IMM GRANULOCYTES # BLD AUTO: 0 K/UL (ref 0–0.04)
IMM GRANULOCYTES NFR BLD AUTO: 0 % (ref 0–0.5)
LIPASE SERPL-CCNC: 22 U/L (ref 13–75)
LYMPHOCYTES # BLD: 0.7 K/UL (ref 0.8–3.5)
LYMPHOCYTES NFR BLD: 28 % (ref 12–49)
MCH RBC QN AUTO: 30 PG (ref 26–34)
MCHC RBC AUTO-ENTMCNC: 32.4 G/DL (ref 30–36.5)
MCV RBC AUTO: 92.7 FL (ref 80–99)
MONOCYTES # BLD: 0.3 K/UL (ref 0–1)
MONOCYTES NFR BLD: 13 % (ref 5–13)
NEUTS SEG # BLD: 1.6 K/UL (ref 1.8–8)
NEUTS SEG NFR BLD: 59 % (ref 32–75)
NRBC # BLD: 0 K/UL (ref 0–0.01)
NRBC BLD-RTO: 0 PER 100 WBC
PLATELET # BLD AUTO: 270 K/UL (ref 150–400)
PMV BLD AUTO: 9.6 FL (ref 8.9–12.9)
POTASSIUM SERPL-SCNC: 4 MMOL/L (ref 3.5–5.1)
PROT SERPL-MCNC: 8.2 G/DL (ref 6.4–8.2)
RBC # BLD AUTO: 4.4 M/UL (ref 3.8–5.2)
RBC MORPH BLD: ABNORMAL
SODIUM SERPL-SCNC: 136 MMOL/L (ref 136–145)
SPECIMEN HOLD: NORMAL
TROPONIN I SERPL HS-MCNC: <4 NG/L (ref 0–51)
WBC # BLD AUTO: 2.6 K/UL (ref 3.6–11)

## 2023-12-12 PROCEDURE — 85025 COMPLETE CBC W/AUTO DIFF WBC: CPT

## 2023-12-12 PROCEDURE — 83690 ASSAY OF LIPASE: CPT

## 2023-12-12 PROCEDURE — 93005 ELECTROCARDIOGRAM TRACING: CPT | Performed by: EMERGENCY MEDICINE

## 2023-12-12 PROCEDURE — 36415 COLL VENOUS BLD VENIPUNCTURE: CPT

## 2023-12-12 PROCEDURE — 6360000004 HC RX CONTRAST MEDICATION: Performed by: RADIOLOGY

## 2023-12-12 PROCEDURE — 84484 ASSAY OF TROPONIN QUANT: CPT

## 2023-12-12 PROCEDURE — 6370000000 HC RX 637 (ALT 250 FOR IP): Performed by: EMERGENCY MEDICINE

## 2023-12-12 PROCEDURE — 80053 COMPREHEN METABOLIC PANEL: CPT

## 2023-12-12 PROCEDURE — 74177 CT ABD & PELVIS W/CONTRAST: CPT

## 2023-12-12 RX ORDER — OMEPRAZOLE 20 MG/1
20 CAPSULE, DELAYED RELEASE ORAL
Qty: 60 CAPSULE | Refills: 0 | Status: SHIPPED | OUTPATIENT
Start: 2023-12-12

## 2023-12-12 RX ORDER — POLYETHYLENE GLYCOL 3350 17 G/17G
17 POWDER, FOR SOLUTION ORAL DAILY
Qty: 578 G | Refills: 0 | Status: SHIPPED | OUTPATIENT
Start: 2023-12-12 | End: 2024-01-11

## 2023-12-12 RX ORDER — SUCRALFATE ORAL 1 G/10ML
1 SUSPENSION ORAL 4 TIMES DAILY
Qty: 280 ML | Refills: 0 | Status: SHIPPED | OUTPATIENT
Start: 2023-12-12 | End: 2023-12-19

## 2023-12-12 RX ORDER — FAMOTIDINE 20 MG/1
20 TABLET, FILM COATED ORAL 2 TIMES DAILY
Status: DISCONTINUED | OUTPATIENT
Start: 2023-12-12 | End: 2023-12-12 | Stop reason: HOSPADM

## 2023-12-12 RX ADMIN — Medication 40 ML: at 13:26

## 2023-12-12 RX ADMIN — FAMOTIDINE 20 MG: 20 TABLET ORAL at 13:26

## 2023-12-12 RX ADMIN — IOPAMIDOL 100 ML: 755 INJECTION, SOLUTION INTRAVENOUS at 12:50

## 2023-12-12 NOTE — DISCHARGE INSTRUCTIONS
The cause of your symptoms is not exactly known still, likely reflux, but you do not have evidence of bowel obstruction, UTI, abscess, appendicitis, or internal bleeding. Your labs and imaging appear reassuring. Because your symptoms are better controlled and you can tolerate fluid, we are allowing you to go home. Make sure to drink plenty of clear fluids. Use medications as prescribed. Contact your primary care provider or the provided specialists for further evaluation. It is rare but possible that some things can be missed on your initial workup; if you notice pain or vomiting that cannot be controlled, fevers, weakness, large amounts of blood in your stool or vomit, unable to urinate, or you are becoming confused or unable to get out of bed, return to the ER for evaluation.

## 2023-12-12 NOTE — ED NOTES
Patient left ED in no acute distress, alert and oriented x4. Patient was encouraged to come back if symptoms get worse. Patient was provided with discharge instructions and prescriptions. All questions were answered. Patient left ambulatory.          Claude Kung, [place]  19/49/27 [id number]

## 2023-12-12 NOTE — ED TRIAGE NOTES
Pt c/o heartburn over last few years, pt now having unintentional weight loss, 10 lbs in last month, some nausea also having epigastric pain, denies diarrhea, some constipation, denies blood or dark tarry stools, denies fever

## 2023-12-13 NOTE — ED PROVIDER NOTES
[facility] EMERGENCY DEP  EMERGENCY DEPARTMENT ENCOUNTER      Pt Name: Susan Mejia  MRN: [id number]  [address] 1975  Date of evaluation: 12/12/2023  Provider: Rubén Gaming MD    CHIEF COMPLAINT       Chief Complaint   Patient presents with    Chest Pain         HISTORY OF PRESENT ILLNESS   (Location/Symptom, Timing/Onset, Context/Setting, Quality, Duration, Modifying Factors, Severity)  Note limiting factors. HPI      Review of External Medical Records:     Nursing Notes were reviewed. REVIEW OF SYSTEMS    (2-9 systems for level 4, 10 or more for level 5)     Review of Systems    Except as noted above the remainder of the review of systems was reviewed and negative. 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 Leiomyoma of uterus     Other ill-defined conditions(799.89)     chronic hives    Vaginal delivery     Twice         SURGICAL HISTORY       Past Surgical History:   Procedure Laterality Date    COLONOSCOPY N/A 05/20/2022    Dr. Aggie Bean    COLONOSCOPY N/A 5/25/2023    COLONOSCOPY performed by Kilo Horne MD at [facility] ENDOSCOPY    GYN      Cervical conization    GYN      cerclage x2    GYN  06/23/2022    Total laparoscopic hysterectomy and bilateral salpingectomy; Samantha Almaraz MD.    Janes Dudley  02/2018    4 teeth removed     HYSTERECTOMY ([address])  06/23/2022    OTHER SURGICAL HISTORY  06/23/2022    Hand-assisted laparoscopic low anterior resection, mobilization of the splenic flexure, and coloproctostomy; Dr. Nery Jones.     UPPER GASTROINTESTINAL ENDOSCOPY [facility]    Schedule an appointment as soon as possible for a visit       Yolanda Singh MD  [address]  [phone]    Schedule an appointment as soon as possible for a visit         DISCHARGE MEDICATIONS:  Discharge Medication List as of 12/12/2023  2:01 PM        START taking these medications    Details   omeprazole (PRILOSEC) 20 MG delayed release capsule Take 1 capsule by mouth 2 times daily (before meals), Disp-60 capsule, R-0Normal      sucralfate (CARAFATE) 1 GM/10ML suspension Take 10 mLs by mouth 4 times daily for 7 days, Disp-280 mL, R-0Normal               (Please note that portions of this note were completed with a voice recognition program.  Efforts were made to edit the dictations but occasionally words are mis-transcribed.)    Bruna Torres MD (electronically signed)  Emergency Attending Physician              Bruna Torres MD  12/19/23 [id number]

## (undated) DEVICE — RD® QUICK LOAD® SURGICAL SUTURE, 2-0 MONOGLIDE®, ABSORBABLE, 53", PURPLE, MONOFILAMENT: Brand: RD® QUICK LOAD®

## (undated) DEVICE — RESERVOIR,SUCTION,100CC,SILICONE: Brand: MEDLINE

## (undated) DEVICE — Device

## (undated) DEVICE — GLOVE SURG SZ 75 L12IN FNGR THK79MIL GRN LTX FREE

## (undated) DEVICE — GLOVE SURG SZ 8 L12IN FNGR THK94MIL STD WHT LTX FREE

## (undated) DEVICE — STAIN INDIA INK IN NACL 10ML --

## (undated) DEVICE — GARMENT,MEDLINE,DVT,INT,CALF,MED, GEN2: Brand: MEDLINE

## (undated) DEVICE — CYSTO-SMH: Brand: MEDLINE INDUSTRIES, INC.

## (undated) DEVICE — SUTURE PERMAHAND SZ 3-0 L18IN NONABSORBABLE BLK L26MM SH C013D

## (undated) DEVICE — SOLUTION IRRIG 3000ML 0.9% SOD CHL USP UROMATIC PLAS CONT

## (undated) DEVICE — SUT VCRL + 2-0 27IN SH VIO --

## (undated) DEVICE — OPEN-END URETERAL CATHETER: Brand: COOK

## (undated) DEVICE — COVER LT HNDL PLAS RIG 1 PER PK

## (undated) DEVICE — SUTURE PERMAHAND SZ 2-0 L18IN NONABSORBABLE BLK L26MM SH C012D

## (undated) DEVICE — SUT MONOCRYL PLUS UD 4-0 --

## (undated) DEVICE — SNARE ENDO 2.4MMX230CM -- COLD EXACTO

## (undated) DEVICE — ACCESS PLATFORM FOR MINIMALLY INVASIVE SURGERY.: Brand: GELPORT® LAPAROSCOPIC  SYSTEM

## (undated) DEVICE — TROCAR ENDOSCP L100MM DIA5MM BLDELSS STBL SL THRD OPT VW

## (undated) DEVICE — PREP SKN CHLRAPRP APL 26ML STR --

## (undated) DEVICE — CONNECTOR CATH URET SIDE PRT FOR FRIC CONN [UCA595] [GU TEK]

## (undated) DEVICE — SUTURE MCRYL SZ 4-0 L27IN ABSRB UD L19MM PS-2 1/2 CIR PRIM Y426H

## (undated) DEVICE — PREMIUM WET SKIN PREP TRAY: Brand: MEDLINE INDUSTRIES, INC.

## (undated) DEVICE — JELLY,LUBE,STERILE,FLIP TOP,TUBE,4-OZ: Brand: MEDLINE

## (undated) DEVICE — BLADE ASSEMB CLP HAIR FINE --

## (undated) DEVICE — CYSTO/BLADDER IRRIGATION SET, REGULATING CLAMP

## (undated) DEVICE — STAPLER INT DIA29MM CLS STPL H1.5-2.2MM OPN LEG L5.2MM 26

## (undated) DEVICE — RELOAD STPL L60MM H1-2.6MM MESENTERY THN TISS WHT 6 ROW

## (undated) DEVICE — SOLUTION IRRIG 1000ML 0.9% SOD CHL USP POUR PLAS BTL

## (undated) DEVICE — TUBING HYDR IRR --

## (undated) DEVICE — TOWEL SURG W17XL27IN STD BLU COT NONFENESTRATED PREWASHED

## (undated) DEVICE — SKIN TEMPERATURE SENSOR: Brand: DEROYAL

## (undated) DEVICE — TK® TI-KNOT® DEVICE: Brand: TK® TI-KNOT®

## (undated) DEVICE — SOLUTION IRRIGATION H2O 0797305] ICU MEDICAL INC]

## (undated) DEVICE — STERILE-Z MAYO STAND COVERS CLEAR POLYETHYLENE STERILE UNIVERSAL FIT 20 PER CASE: Brand: STERILE-Z

## (undated) DEVICE — SHEAR HARMONIC ACET 5MMX36CM -- ACE PLUS

## (undated) DEVICE — SUTURE PROL SZ 2-0 L36IN NONABSORBABLE BLU SH L26MM 1/2 CIR 8523H

## (undated) DEVICE — TOTAL TRAY, 16FR 10ML SIL FOLEY, URN: Brand: MEDLINE

## (undated) DEVICE — TRAP SURG QUAD PARABOLA SLOT DSGN SFTY SCRN TRAPEASE

## (undated) DEVICE — TISSUE RETRIEVAL SYSTEM: Brand: INZII RETRIEVAL SYSTEM

## (undated) DEVICE — FORCEPS BX L240CM JAW DIA2.8MM L CAP W/ NDL MIC MESH TOOTH

## (undated) DEVICE — STAPLER INT L34CM 60MM LNG ENDOSCP ARTC PWR + ECHELON FLX

## (undated) DEVICE — SPONGE LAP 18X18IN STRL -- 5/PK

## (undated) DEVICE — SYR LR LCK 1ML GRAD NSAF 30ML --

## (undated) DEVICE — STAPLER INT DIA25MM CLS STPL H1.5-2.2MM OPN LEG L5.2MM 22

## (undated) DEVICE — 4-PORT MANIFOLD: Brand: NEPTUNE 2

## (undated) DEVICE — TROCAR: Brand: KII® OPTICAL ACCESS SYSTEM

## (undated) DEVICE — SUTURE PERMAHAND SZ 2-0 L30IN NONABSORBABLE BLK SILK W/O A305H

## (undated) DEVICE — REM POLYHESIVE ADULT PATIENT RETURN ELECTRODE: Brand: VALLEYLAB

## (undated) DEVICE — TROCAR ENDOSCP L100MM DIA5MM BLDELSS STBL SL OBT RADLUC

## (undated) DEVICE — APPLIER CLP L SHFT DIA12MM 20 ROT MULT LIGACLP

## (undated) DEVICE — PENCIL SMK EVAC 10 FT BLADE ELECTRD ROCKER FOR TELSCP

## (undated) DEVICE — PAD PT POS 36 IN SURGYPAD DISP

## (undated) DEVICE — SOLUTION IRRIG 1000ML STRL H2O USP PLAS POUR BTL

## (undated) DEVICE — SEALER TISS L35CM DIA5MM CRV JAW ARTC ADV BPLR ENSEAL G2

## (undated) DEVICE — GLOVE SURG SZ 75 L1212IN FNGR THK138MIL BRN LTX FREE

## (undated) DEVICE — TBG INSUFFLATION LUER LOCK: Brand: MEDLINE INDUSTRIES, INC.

## (undated) DEVICE — SUTURE ABSORBABLE MONOFILAMENT 1-0 CT1 27 IN VIO PDS + PDP341H

## (undated) DEVICE — STAPLER INT L37CM STPL 25MM CIR ENDOSCP CRV INTLUMN B FRM

## (undated) DEVICE — GLOVE ORANGE PI 7   MSG9070

## (undated) DEVICE — RELOAD STPL L60MM H1.5-3.6MM REG TISS BLU GRIPPING SURF B

## (undated) DEVICE — Device: Brand: SINGLE USE INJECTOR NM600/610

## (undated) DEVICE — SYSTEM EVAC SMOKE LAPARSCOPIC

## (undated) DEVICE — DERMABOND SKIN ADH 0.7ML -- DERMABOND ADVANCED 12/BX

## (undated) DEVICE — SOLUTION SCRB 2OZ 10% POVIDONE IOD ANTIMIC BTL

## (undated) DEVICE — DRAPE,ROBOTICS,STERILE: Brand: MEDLINE

## (undated) DEVICE — HYPODERMIC SAFETY NEEDLE: Brand: MAGELLAN

## (undated) DEVICE — 5MM RD180® DEVICE: Brand: RD180® - THE RUNNING DEVICE®

## (undated) DEVICE — SYRINGE IRRIG 60ML SFT PLIABLE BLB EZ TO GRP 1 HND USE W/

## (undated) DEVICE — GYN LAPAROSCOPY - SMH: Brand: MEDLINE INDUSTRIES, INC.

## (undated) DEVICE — DRAIN SURG W10XL20CM SIL SMOOTH FLAT 3/4 PERF DBL WRP

## (undated) DEVICE — WRAP SURG W1.31XL1.34M CARD FOR PT 165-172CM THERMOWRP

## (undated) DEVICE — LAPAROSCOPIC TROCAR SLEEVE/SINGLE USE: Brand: KII® OPTICAL ACCESS SYSTEM

## (undated) DEVICE — SYR 10ML LUER LOK 1/5ML GRAD --

## (undated) DEVICE — CATHETER URETH 26FR 30CC BLLN F 3 W SPEC M RND TIP TWO

## (undated) DEVICE — DRAINBAG,ANTI-REFLUX TOWER,L/F,2000ML,LL: Brand: MEDLINE

## (undated) DEVICE — YANKAUER,POOLE TIP,STERILE,50/CS: Brand: MEDLINE

## (undated) DEVICE — TK® QUICK LOAD® UNIT: Brand: TK® QUICK LOAD®

## (undated) DEVICE — COLON CLOSING PACK: Brand: MEDLINE INDUSTRIES, INC.

## (undated) DEVICE — VISUALIZATION SYSTEM: Brand: CLEARIFY